# Patient Record
Sex: FEMALE | Race: WHITE | NOT HISPANIC OR LATINO | Employment: UNEMPLOYED | URBAN - NONMETROPOLITAN AREA
[De-identification: names, ages, dates, MRNs, and addresses within clinical notes are randomized per-mention and may not be internally consistent; named-entity substitution may affect disease eponyms.]

---

## 2017-01-30 ENCOUNTER — OFFICE VISIT - GICH (OUTPATIENT)
Dept: FAMILY MEDICINE | Facility: OTHER | Age: 52
End: 2017-01-30

## 2017-01-30 ENCOUNTER — AMBULATORY - GICH (OUTPATIENT)
Dept: FAMILY MEDICINE | Facility: OTHER | Age: 52
End: 2017-01-30

## 2017-01-30 DIAGNOSIS — G25.81 RESTLESS LEGS SYNDROME: ICD-10-CM

## 2017-01-30 DIAGNOSIS — I80.299 PHLEBITIS AND THROMBOPHLEBITIS OF OTHER DEEP VESSELS OF UNSPECIFIED LOWER EXTREMITY (H): ICD-10-CM

## 2017-01-30 ASSESSMENT — PATIENT HEALTH QUESTIONNAIRE - PHQ9: SUM OF ALL RESPONSES TO PHQ QUESTIONS 1-9: 13

## 2017-02-01 ENCOUNTER — AMBULATORY - GICH (OUTPATIENT)
Dept: ONCOLOGY | Facility: OTHER | Age: 52
End: 2017-02-01

## 2017-02-13 ENCOUNTER — AMBULATORY - GICH (OUTPATIENT)
Dept: FAMILY MEDICINE | Facility: OTHER | Age: 52
End: 2017-02-13

## 2017-04-10 ENCOUNTER — HISTORY (OUTPATIENT)
Dept: FAMILY MEDICINE | Facility: OTHER | Age: 52
End: 2017-04-10

## 2017-04-10 ENCOUNTER — OFFICE VISIT - GICH (OUTPATIENT)
Dept: FAMILY MEDICINE | Facility: OTHER | Age: 52
End: 2017-04-10

## 2017-04-10 DIAGNOSIS — G25.81 RESTLESS LEGS SYNDROME: ICD-10-CM

## 2017-04-10 DIAGNOSIS — F32.89 OTHER SPECIFIED DEPRESSIVE EPISODES: ICD-10-CM

## 2017-04-10 LAB
A/G RATIO - HISTORICAL: 1.5 (ref 1–2)
ABSOLUTE BASOPHILS - HISTORICAL: 0.1 THOU/CU MM
ABSOLUTE EOSINOPHILS - HISTORICAL: 0.1 THOU/CU MM
ABSOLUTE LYMPHOCYTES - HISTORICAL: 2.1 THOU/CU MM (ref 0.9–2.9)
ABSOLUTE MONOCYTES - HISTORICAL: 0.3 THOU/CU MM
ABSOLUTE NEUTROPHILS - HISTORICAL: 2.3 THOU/CU MM (ref 1.7–7)
ALBUMIN SERPL-MCNC: 4 G/DL (ref 3.5–5.7)
ALP SERPL-CCNC: 72 IU/L (ref 34–104)
ALT (SGPT) - HISTORICAL: 15 IU/L (ref 7–52)
ANION GAP - HISTORICAL: 7 (ref 5–18)
AST SERPL-CCNC: 27 IU/L (ref 13–39)
BASOPHILS # BLD AUTO: 2.4 %
BILIRUB SERPL-MCNC: 0.4 MG/DL (ref 0.3–1)
BUN SERPL-MCNC: 12 MG/DL (ref 7–25)
BUN/CREAT RATIO - HISTORICAL: 15
CALCIUM SERPL-MCNC: 8.9 MG/DL (ref 8.6–10.3)
CHLORIDE SERPLBLD-SCNC: 106 MMOL/L (ref 98–107)
CO2 SERPL-SCNC: 23 MMOL/L (ref 21–31)
CREAT SERPL-MCNC: 0.8 MG/DL (ref 0.7–1.3)
EOSINOPHIL NFR BLD AUTO: 2.6 %
ERYTHROCYTE [DISTWIDTH] IN BLOOD BY AUTOMATED COUNT: 17.6 % (ref 11.5–15.5)
GFR IF NOT AFRICAN AMERICAN - HISTORICAL: >60 ML/MIN/1.73M2
GLOBULIN - HISTORICAL: 2.7 G/DL (ref 2–3.7)
GLUCOSE SERPL-MCNC: 91 MG/DL (ref 70–105)
HCT VFR BLD AUTO: 25.2 % (ref 33–51)
HEMOGLOBIN: 7.5 G/DL (ref 12–16)
LYMPHOCYTES NFR BLD AUTO: 41.6 % (ref 20–44)
MCH RBC QN AUTO: 20.2 PG (ref 26–34)
MCHC RBC AUTO-ENTMCNC: 29.9 G/DL (ref 32–36)
MCV RBC AUTO: 68 FL (ref 80–100)
MONOCYTES NFR BLD AUTO: 6.8 %
NEUTROPHILS NFR BLD AUTO: 46.6 % (ref 42–72)
PLATELET # BLD AUTO: 291 THOU/CU MM (ref 140–440)
PMV BLD: 7.2 FL (ref 6.5–11)
POTASSIUM SERPL-SCNC: 3.8 MMOL/L (ref 3.5–5.1)
PROT SERPL-MCNC: 6.7 G/DL (ref 6.4–8.9)
RED BLOOD COUNT - HISTORICAL: 3.73 MIL/CU MM (ref 4–5.2)
SODIUM SERPL-SCNC: 136 MMOL/L (ref 133–143)
TSH - HISTORICAL: 3.4 UIU/ML (ref 0.34–5.6)
WHITE BLOOD COUNT - HISTORICAL: 5 THOU/CU MM (ref 4.5–11)

## 2017-04-10 ASSESSMENT — PATIENT HEALTH QUESTIONNAIRE - PHQ9: SUM OF ALL RESPONSES TO PHQ QUESTIONS 1-9: 14

## 2017-04-11 ENCOUNTER — AMBULATORY - GICH (OUTPATIENT)
Dept: FAMILY MEDICINE | Facility: OTHER | Age: 52
End: 2017-04-11

## 2017-04-11 DIAGNOSIS — E66.09 OTHER OBESITY DUE TO EXCESS CALORIES: ICD-10-CM

## 2017-04-11 DIAGNOSIS — N92.1 EXCESSIVE AND FREQUENT MENSTRUATION WITH IRREGULAR CYCLE: ICD-10-CM

## 2017-05-11 ENCOUNTER — AMBULATORY - GICH (OUTPATIENT)
Dept: FAMILY MEDICINE | Facility: OTHER | Age: 52
End: 2017-05-11

## 2017-07-12 ENCOUNTER — AMBULATORY - GICH (OUTPATIENT)
Dept: FAMILY MEDICINE | Facility: OTHER | Age: 52
End: 2017-07-12

## 2017-07-12 ENCOUNTER — COMMUNICATION - GICH (OUTPATIENT)
Dept: FAMILY MEDICINE | Facility: OTHER | Age: 52
End: 2017-07-12

## 2017-07-19 ENCOUNTER — AMBULATORY - GICH (OUTPATIENT)
Dept: FAMILY MEDICINE | Facility: OTHER | Age: 52
End: 2017-07-19

## 2017-07-28 ENCOUNTER — AMBULATORY - GICH (OUTPATIENT)
Dept: FAMILY MEDICINE | Facility: OTHER | Age: 52
End: 2017-07-28

## 2017-08-10 ENCOUNTER — AMBULATORY - GICH (OUTPATIENT)
Dept: FAMILY MEDICINE | Facility: OTHER | Age: 52
End: 2017-08-10

## 2017-08-14 ENCOUNTER — AMBULATORY - GICH (OUTPATIENT)
Dept: FAMILY MEDICINE | Facility: OTHER | Age: 52
End: 2017-08-14

## 2017-10-26 ENCOUNTER — AMBULATORY - GICH (OUTPATIENT)
Dept: FAMILY MEDICINE | Facility: OTHER | Age: 52
End: 2017-10-26

## 2017-10-26 DIAGNOSIS — F32.89 OTHER SPECIFIED DEPRESSIVE EPISODES: ICD-10-CM

## 2017-12-28 NOTE — TELEPHONE ENCOUNTER
Patient Information     Patient Name MRN Sex Vikki Arias [id number] Female 1965      Telephone Encounter by Radha Meza RN at 10/26/2017  2:49 PM     Author:  Radha Meza RN Service:  (none) Author Type:  NURS- Registered Nurse     Filed:  10/26/2017  2:50 PM Encounter Date:  10/26/2017 Status:  Signed     :  Radha Meza RN (NURS- Registered Nurse)            Attempted to contact patient by phone to obtain more information in case nursing can help in any way.  Left message to call back  ....................Radha Meza RN  10/26/2017   2:50 PM

## 2018-01-02 ENCOUNTER — AMBULATORY - GICH (OUTPATIENT)
Dept: FAMILY MEDICINE | Facility: OTHER | Age: 53
End: 2018-01-02

## 2018-01-02 DIAGNOSIS — J02.9 ACUTE PHARYNGITIS: ICD-10-CM

## 2018-01-03 NOTE — NURSING NOTE
Patient Information     Patient Name MRN Vikki Cardona [id number] Female 1965      Nursing Note by Wanda Scherer at 2017  2:15 PM     Author:  Wanda Scherer Service:  (none) Author Type:  (none)     Filed:  2017  2:34 PM Encounter Date:  2017 Status:  Signed     :  Wanda Scherer            Patient presents to the clinic with left leg swelling, she does stand a lot of her work day.  She's having a lot of pain.    She is having some issues with depression as well.    PHQ Depression Screening 2017   Date of PHQ exam (doc flow) 2017   1. Lack of interest/pleasure 2 - More than half the days   2. Feeling down/depressed 2 - More than half the days   PHQ-2 TOTAL SCORE 4   3. Trouble sleeping 3 - Nearly every day   4. Decreased energy 1 - Several days   5. Appetite change 3 - Nearly every day   6. Feelings of failure 1 - Several days   7. Trouble concentrating 0 - Not at all   8. Activity level 0 - Not at all   9. Hurting yourself 1 - Several days   PHQ-9 TOTAL SCORE 13   PHQ-9 Severity Level moderate       Wanda Scherer LPN....................2017 2:22 PM

## 2018-01-03 NOTE — PROGRESS NOTES
Patient Information     Patient Name MRN Sex Vikki Arias [id number] Female 1965      Progress Notes by Gonzalo Medellin MD at 2017  2:15 PM     Author:  Gonzalo Medellin MD Service:  (none) Author Type:  Physician     Filed:  2017  2:57 PM Encounter Date:  2017 Status:  Signed     :  Gonzalo Medellin MD (Physician)            Nursing Notes:   Wanda Scherer  2017  2:34 PM  Signed  Patient presents to the clinic with left leg swelling, she does stand a lot of her work day.  She's having a lot of pain.    She is having some issues with depression as well.    PHQ Depression Screening 2017   Date of PHQ exam (doc flow) 2017   1. Lack of interest/pleasure 2 - More than half the days   2. Feeling down/depressed 2 - More than half the days   PHQ-2 TOTAL SCORE 4   3. Trouble sleeping 3 - Nearly every day   4. Decreased energy 1 - Several days   5. Appetite change 3 - Nearly every day   6. Feelings of failure 1 - Several days   7. Trouble concentrating 0 - Not at all   8. Activity level 0 - Not at all   9. Hurting yourself 1 - Several days   PHQ-9 TOTAL SCORE 13   PHQ-9 Severity Level moderate       Wanda FAJARDOTracy Scherer LPN....................2017 2:22 PM    Vikki Carrizales is a 51 y.o. female who presents for   Chief Complaint     Patient presents with       Leg Swelling      swollen left leg     Depression      PHQ9: 13     HPI: Ms. Carrizales comes in with left anterior lower leg pain/ she has no over all swelling in the leg and does have a history of phlebitis. She wears support hose. No CP nor shortness of breath. Her BP is elevated today but [facility] has no history of HYPERTENSION.  ewill monitor at home.   Past Medical History      Diagnosis   Date     Allergies: rhintiis       lifelong      Asthma  1984     Depression       Rx: zoloft, citalopram and prozac in the past, none since )      Genital herpes       GERD (gastroesophageal reflux disease)        History of pregnancy        1 child  in childbirth (); one living child (b )      Impetigo       recurrent - as a child      Vitamin D deficiency       Past Surgical History       Procedure   Laterality Date     Pr  delivery only        HELLP syndrome, Platelet transfusions; son        Pr  delivery only        Gastric bypass         [place] en [place] Gastric Bypass, Dr Gabriel Torres, [facility]       Family History       Problem   Relation Age of Onset     Diabetes  Maternal Grandmother      Diabetes  Maternal Aunt      Diabetes  Brother      Other  Maternal Grandfather      Depression       Stroke  Maternal Grandmother      Osteoporosis  Maternal Grandfather      Other  Sister      Thrombophlebitis       Heart Disease  Brother      Other  Mother      Cancer-breast  No Family History      Current Outpatient Prescriptions       Medication  Sig Dispense Refill     fluticasone (50 mcg per actuation) nasal solution (FLONASE) Inhale 2 Sprays into both nostrils once daily. 3 Bottle 2     traZODone (DESYREL) 50 mg tablet TAKE 1/2-1 TABLET BY MOUTH NIGHTLY AT BEDTIME FOR RESTLESS LEGS 30 tablet 1     No current facility-administered medications for this visit.      Medications have been reviewed by me and are current to the best of my knowledge and ability.    Allergies      Allergen   Reactions     Other [Unlisted Allergen (Include Detail In Comments)]       seasonal        EXAM:   Vitals:     17 1422   BP: 152/88   Temp: 98  F (36.7  C)   TempSrc: Temporal   Weight: 87.2 kg (192 lb 3.2 oz)     General Appearance: Pleasant, alert, appropriate appearance for age. No acute distress  Chest/Respiratory Exam: Normal chest wall and respirations. Clear to auscultation.  Cardiovascular Exam: Regular rate and rhythm. S1, S2, no murmur, click, gallop, or rubs.  Musculoskeletal Exam: superfiscial marked tender area left anterior shin with obvious phlebitis; no tenderness in  calf  ASSESSMENT AND PLAN:  1. RESTLESS LEG SYNDROME  This controls symptoms well  - traZODone (DESYREL) 50 mg tablet; Take 0.5-1 tablets by mouth at bedtime.  Dispense: 30 tablet; Refill: 1    2. Thrombophlebitis leg (HC)  Moist heat/ elevation/ indomethacine

## 2018-01-03 NOTE — TELEPHONE ENCOUNTER
Patient Information     Patient Name MRN Sex Vikki Arias [id number] Female 1965      Telephone Encounter by Wanda Scherer at 2017  2:29 PM     Author:  Wanda Scherer Service:  (none) Author Type:  (none)     Filed:  2017  2:29 PM Encounter Date:  2017 Status:  Signed     :  Wanda Scherer            Discussed at appointment  Wanda Scherer LPN....................2017 2:29 PM

## 2018-01-04 NOTE — PROGRESS NOTES
"Patient Information     Patient Name MRN Sex Vikki Arias [id number] Female 1965      Progress Notes by Gonzalo Medellin MD at 4/10/2017  4:15 PM     Author:  Gonzalo Medellin MD  Service:  (none) Author Type:  Physician     Filed:  2017 12:28 PM  Encounter Date:  4/10/2017 Status:  Addendum     :  Gonzalo Medellin MD (Physician)        Related Notes: Original Note by Gonzalo Medellin MD (Physician) filed at 4/10/2017  4:48 PM            Nursing Notes:   Wanda Scherer  4/10/2017  4:27 PM  Signed  Patient presents to the clinic to talk about depression and treatment.  Wanda Scherer LPN....................4/10/2017 4:19 PM    Vikki Carrizales is a 51 y.o. female who presents for   Chief Complaint     Patient presents with       Depression      Talk about meds     HPI: Ms. Carrizales comes in with depression symptoms although she is doing better over the past few days with this; she is wise and has wisdom in dealing with \"down\" symptoms. Her vegatative signs are manageable. But she still has symptoms that could use help; she has had some chills that suggest a need for thyroid testing and CBC and other labs;   Past Medical History:     Diagnosis  Date     Allergies: rhintiis     lifelong      Asthma      Depression     Rx: zoloft, citalopram and prozac in the past, none since )      Genital herpes      GERD (gastroesophageal reflux disease)      History of pregnancy      1 child  in childbirth (); one living child (b )      Impetigo     recurrent - as a child      Vitamin D deficiency      Past Surgical History:      Procedure  Laterality Date     GASTRIC BYPASS  2002     Lenore en Y Gastric Bypass, Dr Gabriel Torres, [facility]       [place]  DELIVERY ONLY      HELLP syndrome, Platelet transfusions; son        FL  DELIVERY ONLY       Family History       Problem   Relation Age of Onset     Other  Mother      Diabetes  Maternal " "Grandmother      Stroke  Maternal Grandmother      Diabetes  Maternal Aunt      Diabetes  Brother      Other  Maternal Grandfather      Depression       Osteoporosis  Maternal Grandfather      Other  Sister      Thrombophlebitis       Heart Disease  Brother      Cancer-breast  No Family History      Current Outpatient Prescriptions       Medication  Sig Dispense Refill     fluticasone (50 mcg per actuation) nasal solution (FLONASE) Inhale 2 Sprays into both nostrils once daily. 3 Bottle 2     traZODone (DESYREL) 50 mg tablet Take 0.5-1 tablets by mouth at bedtime. 30 tablet 6     No current facility-administered medications for this visit.      Medications have been reviewed by me and are current to the best of my knowledge and ability.    Allergies      Allergen   Reactions     Other [Unlisted Allergen (Include Detail In Comments)]       seasonal          EXAM:   Vitals:     04/10/17 1619   BP: 126/84   Weight: 89 kg (196 lb 3.2 oz)   Height: 1.63 m (5' 4.17\")     General Appearance: Pleasant, alert, appropriate appearance for age. No acute distress  Psychiatric Exam: Alert and oriented, appropriate affect.  ASSESSMENT AND PLAN:    2. Other depression  Signs and symptoms/ life discussed for 20 min     3 Anemia- severe iron deficiency related to poor absorption after gastric bypass and irreg heavy menstrual flow- Hemoglobin 7.4 asymptomatic- has been this low in past and has done better with liquid iron prep recommended by previous MD- tammy OTC- with better results for her than typical sulfatre or gluconate; se is encouraged to go with that and recehck Hemoglobin in a month- sooner if symptoms- she declines Rx or parenteral            "

## 2018-01-04 NOTE — NURSING NOTE
Patient Information     Patient Name MRN Vikki Cardona [id number] Female 1965      Nursing Note by Wanda Scherer at 4/10/2017  4:15 PM     Author:  Wanda Scherer Service:  (none) Author Type:  (none)     Filed:  4/10/2017  4:27 PM Encounter Date:  4/10/2017 Status:  Signed     :  Wanda Scherer            Patient presents to the clinic to talk about depression and treatment.  Wanda Scherer LPN....................4/10/2017 4:19 PM

## 2018-01-04 NOTE — TELEPHONE ENCOUNTER
Patient Information     Patient Name MRN Sex Vikki Arias [id number] Female 1965      Telephone Encounter by Wanda Scherer at 2017  3:58 PM     Author:  Wanda Scherer Service:  (none) Author Type:  (none)     Filed:  2017  3:58 PM Encounter Date:  2017 Status:  Signed     :  Wanda Scherer Dr. spoke with patient.  Wanda Scherer LPN....................2017 3:58 PM

## 2018-01-04 NOTE — TELEPHONE ENCOUNTER
Patient Information     Patient Name MRN Sex Vikki Arias [id number] Female 1965      Telephone Encounter by Wanda Scherer at 2017  3:58 PM     Author:  Wanda Scherer Service:  (none) Author Type:  (none)     Filed:  2017  3:58 PM Encounter Date:  2017 Status:  Signed     :  Wanda Scherer Dr. spoke with patient.  Wanda Scherer LPN....................2017 3:58 PM

## 2018-01-12 ENCOUNTER — AMBULATORY - GICH (OUTPATIENT)
Dept: FAMILY MEDICINE | Facility: OTHER | Age: 53
End: 2018-01-12

## 2018-01-15 ENCOUNTER — AMBULATORY - GICH (OUTPATIENT)
Dept: FAMILY MEDICINE | Facility: OTHER | Age: 53
End: 2018-01-15

## 2018-01-15 ENCOUNTER — HISTORY (OUTPATIENT)
Dept: FAMILY MEDICINE | Facility: OTHER | Age: 53
End: 2018-01-15

## 2018-01-15 DIAGNOSIS — G25.81 RESTLESS LEGS SYNDROME: ICD-10-CM

## 2018-01-15 DIAGNOSIS — F32.89 OTHER SPECIFIED DEPRESSIVE EPISODES: ICD-10-CM

## 2018-01-15 DIAGNOSIS — L72.3 SEBACEOUS CYST: ICD-10-CM

## 2018-01-15 ASSESSMENT — PATIENT HEALTH QUESTIONNAIRE - PHQ9: SUM OF ALL RESPONSES TO PHQ QUESTIONS 1-9: 15

## 2018-01-16 ENCOUNTER — AMBULATORY - GICH (OUTPATIENT)
Dept: SURGERY | Facility: OTHER | Age: 53
End: 2018-01-16

## 2018-01-16 ENCOUNTER — HISTORY (OUTPATIENT)
Dept: SURGERY | Facility: OTHER | Age: 53
End: 2018-01-16

## 2018-01-16 DIAGNOSIS — L02.91 CUTANEOUS ABSCESS: ICD-10-CM

## 2018-01-16 DIAGNOSIS — L72.3 SEBACEOUS CYST: ICD-10-CM

## 2018-01-26 ENCOUNTER — DOCUMENTATION ONLY (OUTPATIENT)
Dept: FAMILY MEDICINE | Facility: OTHER | Age: 53
End: 2018-01-26

## 2018-01-26 PROBLEM — E66.09 NON MORBID OBESITY DUE TO EXCESS CALORIES: Status: ACTIVE | Noted: 2017-04-11

## 2018-01-26 PROBLEM — K21.9 ESOPHAGEAL REFLUX: Status: ACTIVE | Noted: 2018-01-26

## 2018-01-26 PROBLEM — N92.1 MENORRHAGIA WITH IRREGULAR CYCLE: Status: ACTIVE | Noted: 2017-04-11

## 2018-01-26 PROBLEM — J30.9 ALLERGIC RHINITIS: Status: ACTIVE | Noted: 2018-01-26

## 2018-01-26 PROBLEM — G25.81 RESTLESS LEG SYNDROME: Status: ACTIVE | Noted: 2018-01-26

## 2018-01-26 RX ORDER — TRAZODONE HYDROCHLORIDE 50 MG/1
25-50 TABLET, FILM COATED ORAL AT BEDTIME
COMMUNITY
Start: 2017-01-30 | End: 2019-01-28

## 2018-01-26 RX ORDER — FLUTICASONE PROPIONATE 50 MCG
2 SPRAY, SUSPENSION (ML) NASAL DAILY
COMMUNITY
Start: 2015-01-16 | End: 2019-03-25

## 2018-01-27 VITALS
WEIGHT: 192.2 LBS | SYSTOLIC BLOOD PRESSURE: 152 MMHG | DIASTOLIC BLOOD PRESSURE: 88 MMHG | BODY MASS INDEX: 32.81 KG/M2 | TEMPERATURE: 98 F

## 2018-01-27 VITALS
BODY MASS INDEX: 33.49 KG/M2 | WEIGHT: 196.2 LBS | SYSTOLIC BLOOD PRESSURE: 126 MMHG | HEIGHT: 64 IN | DIASTOLIC BLOOD PRESSURE: 84 MMHG

## 2018-01-28 ENCOUNTER — HEALTH MAINTENANCE LETTER (OUTPATIENT)
Age: 53
End: 2018-01-28

## 2018-02-03 ASSESSMENT — PATIENT HEALTH QUESTIONNAIRE - PHQ9
SUM OF ALL RESPONSES TO PHQ QUESTIONS 1-9: 14
SUM OF ALL RESPONSES TO PHQ QUESTIONS 1-9: 13

## 2018-02-09 ENCOUNTER — DOCUMENTATION ONLY (OUTPATIENT)
Dept: FAMILY MEDICINE | Facility: OTHER | Age: 53
End: 2018-02-09

## 2018-02-09 VITALS
SYSTOLIC BLOOD PRESSURE: 138 MMHG | TEMPERATURE: 97.3 F | BODY MASS INDEX: 34.42 KG/M2 | WEIGHT: 206.6 LBS | DIASTOLIC BLOOD PRESSURE: 86 MMHG | HEIGHT: 65 IN

## 2018-02-09 VITALS
HEIGHT: 65 IN | DIASTOLIC BLOOD PRESSURE: 80 MMHG | WEIGHT: 206 LBS | SYSTOLIC BLOOD PRESSURE: 120 MMHG | BODY MASS INDEX: 34.32 KG/M2

## 2018-02-11 ASSESSMENT — PATIENT HEALTH QUESTIONNAIRE - PHQ9: SUM OF ALL RESPONSES TO PHQ QUESTIONS 1-9: 15

## 2018-02-12 ENCOUNTER — DOCUMENTATION ONLY (OUTPATIENT)
Dept: FAMILY MEDICINE | Facility: OTHER | Age: 53
End: 2018-02-12

## 2018-02-12 NOTE — PROGRESS NOTES
Patient Information     Patient Name MRN Sex     Vikki Carrizales [id number] Female 1965      Progress Notes by Gonzalo Medellin MD at 1/15/2018  3:45 PM     Author:  Gonzalo Medellin MD Service:  (none) Author Type:  Physician     Filed:  1/15/2018  4:30 PM Encounter Date:  1/15/2018 Status:  Signed     :  Gonzalo Medellin MD (Physician)            There are no exam notes on file for this visit.  Vikki Carrizales is a 52 y.o. female who presents for   Chief Complaint     Patient presents with       Derm Problem      Cyst on back     HPI: Ms. Carrizales comes in with infected area on her R upper back- typical of what a sebaceous gets like when infected. It has been present a few days and it hurts. She was not aware of any cyst there previously  Past Medical History:     Diagnosis  Date     Allergies: rhintiis     lifelong      Asthma      Depression     Rx: zoloft, citalopram and prozac in the past, none since )      Genital herpes      GERD (gastroesophageal reflux disease)      History of pregnancy      1 child  in childbirth (); one living child (b )      Impetigo     recurrent - as a child      Vitamin D deficiency      Past Surgical History:      Procedure  Laterality Date     GASTRIC BYPASS       Lenore en Y Gastric Bypass, Dr Gabriel Torres, [facility]       [place]  DELIVERY ONLY      HELLP syndrome, Platelet transfusions; son        [place]  DELIVERY ONLY       Family History       Problem   Relation Age of Onset     Other  Mother      Diabetes  Maternal Grandmother      Stroke  Maternal Grandmother      Diabetes  Maternal Aunt      Diabetes  Brother      Other  Maternal Grandfather      Depression       Osteoporosis  Maternal Grandfather      Other  Sister      Thrombophlebitis       Heart Disease  Brother      Cancer-breast  No Family History      Current Outpatient Prescriptions       Medication  Sig Dispense Refill     fluticasone (50  "mcg per actuation) nasal solution (FLONASE) Inhale 2 Sprays into both nostrils once daily. 3 Bottle 2     sertraline (ZOLOFT) 50 mg tablet Take 1 tablet by mouth once daily. 30 tablet 4     traZODone (DESYREL) 50 mg tablet Take 0.5-1 tablets by mouth at bedtime. 30 tablet 6     No current facility-administered medications for this visit.      Medications have been reviewed by me and are current to the best of my knowledge and ability.    Allergies      Allergen   Reactions     Other [Unlisted Allergen (Include Detail In Comments)]       seasonal        Vitals:     01/15/18 1555   BP: 138/86   Cuff Site: Right Arm   Position: Sitting   Cuff Size: Adult Large   Temp: 97.3  F (36.3  C)   TempSrc: Temporal   Weight: 93.7 kg (206 lb 9.6 oz)   Height: 1.638 m (5' 4.5\")     General Appearance: Pleasant, alert, appropriate appearance for age. No acute distress  Skin: has large fluctuant cyst on her R upper back   ASSESSMENT AND PLAN:  1. RESTLESS LEG SYNDROME    - traZODone (DESYREL) 50 mg tablet; Take 0.5-1 tablets by mouth at bedtime.  Dispense: 30 tablet; Refill: 6    2. Other depression    - sertraline (ZOLOFT) 50 mg tablet; Take 1 tablet by mouth once daily.  Dispense: 30 tablet; Refill: 11    3. Sebaceous cyst  See surgeon ASAP                 "

## 2018-02-13 ENCOUNTER — OFFICE VISIT (OUTPATIENT)
Dept: SURGERY | Facility: OTHER | Age: 53
End: 2018-02-13
Attending: SURGERY

## 2018-02-13 VITALS — WEIGHT: 207.4 LBS | BODY MASS INDEX: 35.05 KG/M2 | SYSTOLIC BLOOD PRESSURE: 102 MMHG | DIASTOLIC BLOOD PRESSURE: 64 MMHG

## 2018-02-13 DIAGNOSIS — L08.9 INFECTED SEBACEOUS CYST OF SKIN: Primary | ICD-10-CM

## 2018-02-13 DIAGNOSIS — L72.3 INFECTED SEBACEOUS CYST OF SKIN: Primary | ICD-10-CM

## 2018-02-13 PROCEDURE — 99212 OFFICE O/P EST SF 10 MIN: CPT | Performed by: SURGERY

## 2018-02-13 NOTE — PROGRESS NOTES
"Patient Information     Patient Name MRN Sex Vikki Arias [id number] Female 1965      Progress Notes by Debi Vieyra MD at 2018  2:20 PM     Author:  Debi Vieyra MD Service:  (none) Author Type:  Physician     Filed:  2018  3:15 PM Encounter Date:  2018 Status:  Signed     :  Debi Vieyra MD (Physician)            Patient with inflamed and swollen cyst on the right upper back. Has had this before and drained it herself. She has been trying to get this to drain but has been unsuccessful. She hasn't had fever.   /80 (Cuff Site: Right Arm, Position: Sitting, Cuff Size: Adult Large)  Ht 1.638 m (5' 4.5\")  Wt 93.4 kg (206 lb)  LMP 2017  BMI 34.81 kg/m2  General; no acute distress  Skin: right upper back with 4 x 3 x 2 cm area of induration and erythema, central fluctuance noted. No active drainage.   A-infected cyst  Plan:  Discussed risks and benefits of incision and drainage infection. Specifically, I explained the risks of continued infection, bleeding, bruising and recurrence. The patient expressed understanding and wishes to proceed. Informed consent paperwork was completed.     Procedure:  The patient was placed in a left lateral position. The right upper back was prepped with chloraprep and draped. Local anesthetic, lidocaine with epinephrine, was infiltrated in the skin and subcutaneous tissue in the area of planned incision. The area of fluctuance was palpated. Skin incision was made sharply. There was drainage of purulent fluid. The abscess cavity was explored and no undrained fluid was noted. The abscess cavity measured 3 x 2 x 2 cm. Sterile gauze was placed in the cavity. Sterile dressing was applied. The patient tolerated the procedure with no immediately apparent complications.     Patient will follow up in 4 weeks. Patient will call with concerns or questions prior to follow up.            "

## 2018-02-13 NOTE — NURSING NOTE
Patient Information     Patient Name MRN Sex Vikki Arias [id number] Female 1965      Nursing Note by Cynthia King at 2018  2:20 PM     Author:  Cynthia King  Service:  (none) Author Type:  (none)     Filed:  2018  2:39 PM  Encounter Date:  2018 Status:  Addendum     :  Cynthia King        Related Notes: Original Note by Cynthia King filed at 2018  2:26 PM            Here today with a cyst on her upper right back.  Cynthia King LPN..........2018  2:25 PM    Universal Protocol    A. Pre-procedure verification complete yes  1-relevant information / documentation available, reviewed and properly matched to the patient; 2-consent accurate and complete, 3-equipment and supplies available    B. Site marking complete Yes  Site marked if not in continuous attendance with patient    C. TIME OUT completed yes  Time Out was conducted just prior to starting procedure to verify the eight required elements: 1-patient identity, 2-consent accurate and complete, 3-position, 4-correct side/site marked (if applicable), 5-procedure, 6-relevant images / results properly labeled and displayed (if applicable), 7-antibiotics / irrigation fluids (if applicable), 8-safety precautions.                                                            Cynthia King LPN..........2018  2:39 PM

## 2018-02-13 NOTE — MR AVS SNAPSHOT
"              After Visit Summary   2018    Vikki Carrizales    MRN: [id number]           Patient Information     Date Of Birth          1965        Visit Information        Provider Department      2018 3:50 PM Debi Vieyra MD;   SURGERY [facility]        Today's Diagnoses     Infected sebaceous cyst of skin    -  1       Follow-ups after your visit        Follow-up notes from your care team     Return if symptoms worsen or fail to improve.      Who to contact     If you have questions or need follow up information about today's clinic visit or your schedule please contact [facility] AND [facility] directly at [phone].  Normal or non-critical lab and imaging results will be communicated to you by PointCarehart, letter or phone within 4 business days after the clinic has received the results. If you do not hear from us within 7 days, please contact the clinic through PointCarehart or phone. If you have a critical or abnormal lab result, we will notify you by phone as soon as possible.  Submit refill requests through [employer] or call your pharmacy and they will forward the refill request to us. Please allow 3 business days for your refill to be completed.          Additional Information About Your Visit        MyChart Information     FabAlley lets you send messages to your doctor, view your test results, renew your prescriptions, schedule appointments and more. To sign up, go to [email or web address] . Click on \"Log in\" on the left side of the screen, which will take you to the Welcome page. Then click on \"Sign up Now\" on the right side of the page.     You will be asked to enter the access code listed below, as well as some personal information. Please follow the directions to create your username and password.     Your access code is: [email or web address]  Expires: 2018  2:42 PM     Your access code will  in 90 days. If you need help or a new code, please call your " [facility] or [phone].        Care EveryWhere ID     This is your Care EveryWhere ID. This could be used by other organizations to access your [place] medical records  [id number]        Your Vitals Were     Last Period BMI (Body Mass Index)                (LMP Unknown) 35.05 kg/m2           Blood Pressure from Last 3 Encounters:   02/13/18 102/64   01/16/18 120/80   01/15/18 138/86    Weight from Last 3 Encounters:   02/13/18 207 lb 6.4 oz (94.1 kg)   01/16/18 206 lb (93.4 kg)   01/15/18 206 lb 9.6 oz (93.7 kg)              Today, you had the following     No orders found for display       Primary Care Provider Office Phone # Fax #    Gonzalo Medellin [phone] [address] [place] [address]        Equal Access to Services     [facility]: Hadii aad ku hadasho Sokamryn, waaxda luqadaha, qaybta kaalmada adeegyada, horace calvo . So [facility] [phone].    ATENCIÓN: Si habla español, tiene a murphy disposición servicios gratuitos de asistencia lingüística. Llame al [phone].    We comply with applicable federal civil rights laws and [place] laws. We do not discriminate on the basis of race, color, national origin, age, disability, sex, sexual orientation, or gender identity.            Thank you!     Thank you for choosing [facility] AND [facility]  for your care. Our goal is always to provide you with excellent care. Hearing back from our patients is one way we can continue to improve our services. Please take a few minutes to complete the written survey that you may receive in the mail after your visit with us. Thank you!             Your Updated Medication List - Protect others around you: Learn how to safely use, store and throw away your medicines at www.disposemymeds.org.          This list is accurate as of 2/13/18 11:59 PM.  Always use your most recent med list.                   Brand Name Dispense Instructions for use  Diagnosis    fluticasone 50 MCG/ACT spray    FLONASE     Spray 2 sprays in nostril daily        sertraline 50 MG tablet    ZOLOFT     Take 50 mg by mouth daily        traZODone 50 MG tablet    DESYREL     Take 25-50 mg by mouth At Bedtime

## 2018-02-13 NOTE — NURSING NOTE
Here today in follow up for a infected cyst on her back.  Cynthia King LPN..........2/13/2018  3:59 PM

## 2018-02-13 NOTE — PATIENT INSTRUCTIONS
Patient Information     Patient Name MRN Sex Vikki Arias [id number] Female 1965      Patient Instructions by Debi Vieyra MD at 2018  2:20 PM     Author:  Debi Vieyra MD Service:  (none) Author Type:  Physician     Filed:  2018  2:54 PM Encounter Date:  2018 Status:  Signed     :  Debi Vieyra MD (Physician)            Your incision was left open to drain. OK to shower. Follow up in 4 weeks.

## 2018-02-18 PROBLEM — L72.3 INFECTED SEBACEOUS CYST OF SKIN: Status: RESOLVED | Noted: 2018-02-18 | Resolved: 2018-02-18

## 2018-02-18 PROBLEM — L08.9 INFECTED SEBACEOUS CYST OF SKIN: Status: RESOLVED | Noted: 2018-02-18 | Resolved: 2018-02-18

## 2018-02-18 PROBLEM — L08.9 INFECTED SEBACEOUS CYST OF SKIN: Status: ACTIVE | Noted: 2018-02-18

## 2018-02-18 PROBLEM — L72.3 INFECTED SEBACEOUS CYST OF SKIN: Status: ACTIVE | Noted: 2018-02-18

## 2018-02-18 NOTE — PROGRESS NOTES
Primary Care Physician: Gonzalo Medellin    HPI:   Patient is here for follow up. The patient is 52 year old female with a previous infected cyst on her right upper back. It was incised and drained. She did well after. There was some drainage and bleeding for a few days but the area has completely healed. She has no pain. She hasn't noticed any lump or bump.    ASSESSMENT AND PLAN/RECOMMENDATIONS:   Resolved infected cyst right upper back. We dicussed excision vs. Monitoring for recurrence. At this time the patient will monitor for any sign of recurrence. If she notes a lump or bump, she will schedule to have it removed. She denies questions at this time. She will call for concerns.    PAST MEDICAL HISTORY  Past Medical History:   Diagnosis Date     Cutaneous abscess     2018     Gastro-esophageal reflux disease without esophagitis          Herpesviral infection of urogenital system     No Comments Provided     Impetigo     recurrent - as a child     Major depressive disorder, single episode     ,Rx: zoloft, citalopram and prozac in the past, none since )     Other seasonal allergic rhinitis     lifelong     Personal history of other medical treatment (CODE)      1 child  in childbirth (); one living child (b )     Uncomplicated asthma          Vitamin D deficiency     No Comments Provided      PAST SURGICAL HISTORY    Past Surgical History:   Procedure Laterality Date      SECTION      ,,HELLP syndrome, Platelet transfusions; son       SECTION           LAPAROSCOPIC BYPASS GASTRIC      , Lenore en Y Gastric Bypass, Dr Gabriel Torres, [facility]     CURRENT MEDS    Current Outpatient Prescriptions   Medication     fluticasone (FLONASE) 50 MCG/ACT spray     sertraline (ZOLOFT) 50 MG tablet     traZODone (DESYREL) 50 MG tablet     No current facility-administered medications for this visit.      ALLERGIES/SENSITIVITIES    Allergies   Allergen  Reactions     Seasonal Allergies      seasonal     REVIEW OF SYSTEMS  GENERAL: No fevers orchills. Denies fatigue, recent weight loss.  HEENT: No sinus drainage. No changes with vision or hearing. No difficulty swallowing.   LYMPHATICS:  No swollen nodes in axilla, neck or groin.  CARDIOVASCULAR: Denieschest pain, palpitations and dyspnea on exertion.  PULMONARY: No shortness of breath or cough. No increase in sputum production.  GI: Denies melena, bright red blood in stools. No hematemesis. No constipation ordiarrhea.  : No dysuria or hematuria.  SKIN: No recent rashes or ulcers.   HEMATOLOGY:  No history of easy bruising or bleeding.  ENDOCRINE:  No history of diabetes or thyroid problems.  NEUROLOGY:  Nohistory of seizures or headaches. No motor or sensory changes.    PHYSICAL EXAM  Vitals: /64 (BP Location: Right arm, Patient Position: Sitting, Cuff Size: Adult Large)  Wt 207 lb 6.4 oz (94.1 kg)  LMP  (LMP Unknown)  BMI 35.05 kg/m2  BMI= Body mass index is 35.05 kg/(m^2).  GENERAL: Healthy appearing patient in no acute distress. Pleasant and cooperative with exam and interview.   HEENT:Head-normocephalic. Eyes-no scleral icterus, pupils equal, round, and reactive to light. Nose-no nasal drainage. No lesions. Mouth-oral mucosa pink and moist, no lesions.  SKIN: Pink, warm and dry. No jaundice. No rash. Right upper back with well healed cruciate scar. No fluid collection or associated nodularity  NEURO: Alert and oriented.  PSYCH: Appropriate mood and affect.

## 2018-02-26 ENCOUNTER — MYC MEDICAL ADVICE (OUTPATIENT)
Dept: SURGERY | Facility: OTHER | Age: 53
End: 2018-02-26

## 2018-02-26 ENCOUNTER — MYC MEDICAL ADVICE (OUTPATIENT)
Dept: FAMILY MEDICINE | Facility: OTHER | Age: 53
End: 2018-02-26

## 2018-02-27 NOTE — TELEPHONE ENCOUNTER
Dr Luciano is focusing on chronic disease I believe as it relates to obesity such as diabetes and HTN.. I am not aware that he is working on wt loss specifically but he is a good MD so seeing him would be fine to check it out...maybe call first. Bottom line there is no easy out and it always comes down to calories in/ calories out

## 2018-02-27 NOTE — TELEPHONE ENCOUNTER
Spoke with patient after verifying last name and birth date, informed her of Gonzalo Medellin MD note below.   Debora Gaspar LPN 2/27/2018 9:05 AM

## 2018-03-01 ENCOUNTER — MYC MEDICAL ADVICE (OUTPATIENT)
Dept: FAMILY MEDICINE | Facility: OTHER | Age: 53
End: 2018-03-01

## 2018-03-05 NOTE — TELEPHONE ENCOUNTER
Left message on answering machine for patient to make appointment.  Diana Guzman CMA..............3/5/2018........12:46 PM

## 2018-03-07 PROBLEM — L02.91 ABSCESS: Status: ACTIVE | Noted: 2018-01-16

## 2018-03-08 ENCOUNTER — OFFICE VISIT (OUTPATIENT)
Dept: FAMILY MEDICINE | Facility: OTHER | Age: 53
End: 2018-03-08
Attending: FAMILY MEDICINE

## 2018-03-08 VITALS
TEMPERATURE: 97.9 F | WEIGHT: 204.2 LBS | SYSTOLIC BLOOD PRESSURE: 110 MMHG | BODY MASS INDEX: 34.86 KG/M2 | HEART RATE: 62 BPM | HEIGHT: 64 IN | DIASTOLIC BLOOD PRESSURE: 60 MMHG

## 2018-03-08 DIAGNOSIS — F43.21 ADJUSTMENT DISORDER WITH DEPRESSED MOOD: Primary | ICD-10-CM

## 2018-03-08 PROCEDURE — 99213 OFFICE O/P EST LOW 20 MIN: CPT | Performed by: FAMILY MEDICINE

## 2018-03-08 ASSESSMENT — PAIN SCALES - GENERAL: PAINLEVEL: NO PAIN (0)

## 2018-03-08 NOTE — MR AVS SNAPSHOT
"              After Visit Summary   3/8/2018    Vikki Carrizales    MRN: [id number]           Patient Information     Date Of Birth          1965        Visit Information        Provider Department      3/8/2018 4:00 PM Gonzalo Medellin MD [facility]        Today's Diagnoses     Adjustment disorder with depressed mood    -  1       Follow-ups after your visit        Who to contact     If you have questions or need follow up information about today's clinic visit or your schedule please contact [facility] directly at [phone].  Normal or non-critical lab and imaging results will be communicated to you by [employer], letter or phone within 4 business days after the clinic has received the results. If you do not hear from us within 7 days, please contact the clinic through [employer] or phone. If you have a critical or abnormal lab result, we will notify you by phone as soon as possible.  Submit refill requests through [employer] or call your pharmacy and they will forward the refill request to us. Please allow 3 business days for your refill to be completed.          Additional Information About Your Visit        MyChart Information     [employer] gives you secure access to your electronic health record. If you see a primary care provider, you can also send messages to your care team and make appointments. If you have questions, please call your primary care clinic.  If you do not have a primary care provider, please call [phone] and they will assist you.        Care EveryWhere ID     This is your Care EveryWhere ID. This could be used by other organizations to access your [place] medical records  [id number]        Your Vitals Were     Pulse Temperature Height Last Period BMI (Body Mass Index)       62 97.9  F (36.6  C) (Temporal) 5' 3.5\" (1.613 m) (LMP Unknown) 35.61 kg/m2        Blood Pressure from Last 3 Encounters:   03/08/18 110/60   02/13/18 102/64   01/16/18 120/80 "    Weight from Last 3 Encounters:   03/08/18 204 lb 3.2 oz (92.6 kg)   02/13/18 207 lb 6.4 oz (94.1 kg)   01/16/18 206 lb (93.4 kg)              Today, you had the following     No orders found for display       Primary Care Provider Office Phone # Fax #    Gonzalo Medellin [phone] [address]   [place] [address]        Equal Access to Services     [facility] : Hadii aad ku hadasho Soomaali, waaxda luqadaha, qaybta kaalmada adeegyada, waxay idiin hayaan adeeg kharash la'soheilan . So [facility] [phone].    ATENCIÓN: Si habla español, tiene a murphy disposición servicios gratuitos de asistencia lingüística. [facility] [phone].    We comply with applicable federal civil rights laws and [place] laws. We do not discriminate on the basis of race, color, national origin, age, disability, sex, sexual orientation, or gender identity.            Thank you!     Thank you for choosing [facility]  for your care. Our goal is always to provide you with excellent care. Hearing back from our patients is one way we can continue to improve our services. Please take a few minutes to complete the written survey that you may receive in the mail after your visit with us. Thank you!             Your Updated Medication List - Protect others around you: Learn how to safely use, store and throw away your medicines at www.disposemymeds.org.          This list is accurate as of 3/8/18  4:32 PM.  Always use your most recent med list.                   Brand Name Dispense Instructions for use Diagnosis    fluticasone 50 MCG/ACT spray    FLONASE     Spray 2 sprays in nostril daily        sertraline 50 MG tablet    ZOLOFT     Take 50 mg by mouth daily        traZODone 50 MG tablet    DESYREL     Take 25-50 mg by mouth At Bedtime

## 2018-03-08 NOTE — NURSING NOTE
Patient presents in the clinic to follow on her bilateral edema.  Debora Gaspar LPN 3/8/2018 4:04 PM

## 2018-03-08 NOTE — PROGRESS NOTES
"  SUBJECTIVE:   Vikki Carrizales is a 52 year old female who presents to clinic today for the following health issues:has depressive sx and took zoloft and gained a fair amount of wt.. She has stopped it as there as not great help. We discussed adding wellbutrin to the zoloft.  Last week yaima wiseman had more edema than usual. She has put on wt and last week she had marked RAMOS and SOB. No orthopnea. No hx of heart issues but strong FH. Dad  57 AAA and brother dies of presumed cardiac issue at age 58. Aunt has CHF early 50s start. She also had ha at that time  No CP and admits she may hhave had excessive salt load          Problem list and histories reviewed & adjusted, as indicated.  Additional history: as documented        Reviewed and updated as needed this visit by clinical staff  Tobacco  Allergies  Meds  Med Hx  Surg Hx  Fam Hx  Soc Hx      Reviewed and updated as needed this visit by Provider             OBJECTIVE:     /60 (BP Location: Right arm, Patient Position: Sitting, Cuff Size: Adult Regular)  Pulse 62  Temp 97.9  F (36.6  C) (Temporal)  Ht 5' 3.5\" (1.613 m)  Wt 204 lb 3.2 oz (92.6 kg)  LMP  (LMP Unknown)  BMI 35.61 kg/m2  Body mass index is 35.61 kg/(m^2).  GENERAL: healthy, alert and no distress  RESP: lungs clear to auscultation - no rales, rhonchi or wheezes  CV: regular rate and rhythm, normal S1 S2, no S3 or S4, no murmur, click or rub, no peripheral edema and peripheral pulses strong        ASSESSMENT/PLAN:         1. Adjustment disorder with depressed mood  Offered zoloft and wellbutrin which she will consider in the Fall;   2 RAMOS - resolved; no need fro [place] at this time      See Patient Instructions    YUE LARA MD  [facility]  "

## 2018-03-09 ASSESSMENT — PATIENT HEALTH QUESTIONNAIRE - PHQ9: SUM OF ALL RESPONSES TO PHQ QUESTIONS 1-9: 12

## 2018-03-20 ENCOUNTER — MYC MEDICAL ADVICE (OUTPATIENT)
Dept: FAMILY MEDICINE | Facility: OTHER | Age: 53
End: 2018-03-20

## 2018-03-20 DIAGNOSIS — J45.909 ASTHMA: ICD-10-CM

## 2018-03-20 DIAGNOSIS — F33.9 DEPRESSION, MAJOR, RECURRENT (H): ICD-10-CM

## 2018-03-20 DIAGNOSIS — J30.89 CHRONIC NONSEASONAL ALLERGIC RHINITIS DUE TO POLLEN: ICD-10-CM

## 2018-03-20 DIAGNOSIS — J30.9 CHRONIC ALLERGIC RHINITIS, UNSPECIFIED SEASONALITY, UNSPECIFIED TRIGGER: ICD-10-CM

## 2018-03-20 DIAGNOSIS — J45.909 UNCOMPLICATED ASTHMA, UNSPECIFIED ASTHMA SEVERITY, UNSPECIFIED WHETHER PERSISTENT: ICD-10-CM

## 2018-03-20 DIAGNOSIS — J30.9 ALLERGIC RHINITIS: Primary | ICD-10-CM

## 2018-03-20 DIAGNOSIS — J30.1 CHRONIC ALLERGIC RHINITIS DUE TO POLLEN, UNSPECIFIED SEASONALITY: ICD-10-CM

## 2018-03-20 DIAGNOSIS — F32.9 REACTIVE DEPRESSION: ICD-10-CM

## 2018-04-02 RX ORDER — ALBUTEROL SULFATE 90 UG/1
2 AEROSOL, METERED RESPIRATORY (INHALATION) EVERY 6 HOURS PRN
Qty: 3 INHALER | Refills: 1 | Status: SHIPPED | OUTPATIENT
Start: 2018-04-02 | End: 2019-03-25

## 2018-04-02 NOTE — TELEPHONE ENCOUNTER
She wants to be on both Wellbutrin and Zoloft. (as discussed in last visit)    She would like albuterol rescue inhaler refilled. (added to medication list)    The Wellbutrin is not on her medication list so that will need to be added    Wanda Scherer LPN  4/2/2018  9:49 AM

## 2018-04-04 ENCOUNTER — MYC MEDICAL ADVICE (OUTPATIENT)
Dept: FAMILY MEDICINE | Facility: OTHER | Age: 53
End: 2018-04-04

## 2018-04-04 DIAGNOSIS — F41.1 GAD (GENERALIZED ANXIETY DISORDER): Primary | ICD-10-CM

## 2018-04-04 NOTE — TELEPHONE ENCOUNTER
Patient is requesting a prescription of Wellbutrin, as requested in the previous message. Please advise  Thank You  Debora Gaspar LPN 4/4/2018 11:54 AM

## 2018-04-05 RX ORDER — BUPROPION HYDROCHLORIDE 150 MG/1
150 TABLET ORAL EVERY MORNING
Qty: 90 TABLET | Refills: 3 | Status: SHIPPED | OUTPATIENT
Start: 2018-04-05 | End: 2019-03-25

## 2018-04-05 NOTE — TELEPHONE ENCOUNTER
rx sent to [facility].. The dose can be increased to 2 a day if needed after 2 weeks

## 2018-06-17 ENCOUNTER — MYC MEDICAL ADVICE (OUTPATIENT)
Dept: FAMILY MEDICINE | Facility: OTHER | Age: 53
End: 2018-06-17

## 2018-08-26 ENCOUNTER — MYC MEDICAL ADVICE (OUTPATIENT)
Dept: FAMILY MEDICINE | Facility: OTHER | Age: 53
End: 2018-08-26

## 2018-08-27 ENCOUNTER — TELEPHONE (OUTPATIENT)
Dept: FAMILY MEDICINE | Facility: OTHER | Age: 53
End: 2018-08-27

## 2019-01-28 ENCOUNTER — TELEPHONE (OUTPATIENT)
Dept: FAMILY MEDICINE | Facility: OTHER | Age: 54
End: 2019-01-28

## 2019-01-28 DIAGNOSIS — G25.81 RESTLESS LEG SYNDROME: Primary | ICD-10-CM

## 2019-01-28 DIAGNOSIS — F32.9 REACTIVE DEPRESSION: ICD-10-CM

## 2019-01-28 NOTE — LETTER
January 29, 2019      Vikki Carrizales     [facility] [place] [address]        Dear Vikki,     In the absence of Dr. Gloria, it is recommended that you call to schedule appointment with one of our other providers to discuss your medications and medication refills. At the same time, you can discuss you might intend on establishing care with.    This letter is to remind you that you are over-due for your annual exam.    Please call the clinic at [phone] to schedule your appointment.    If you should require additional refills before your scheduled appointment, please contact your pharmacy and we will refill your medication until the date of your appointment.    Thank you for choosing [facility] for your health care needs.    Sincerely,    Refill RN  [facility]

## 2019-01-28 NOTE — LETTER
January 29, 2019      Vikki Carrizales     [facility] [address]        Dear Vikki,     In the absence of Dr. Medellin, it is recommended that you call to schedule appointment with one of our other providers to discuss your medications and medication refills. At the same time, you can discuss you might intend on establishing care with.    This letter is to remind you that you are over-due for your annual exam.    Please call the clinic at [phone] to schedule your appointment.    If you should require additional refills before your scheduled appointment, please contact your pharmacy and we will refill your medication until the date of your appointment.    Thank you for choosing [facility] for your health care needs.      Refill RN  [facility]

## 2019-01-28 NOTE — TELEPHONE ENCOUNTER
[facility] sent Rx request for the following:      sertraline (ZOLOFT) 50 MG tablet  Sig: Take 1 tablet (50 mg) by mouth daily  Last Written Prescription Date:  4/5/18  Last Fill Quantity: 90,   # refills: 1    Routing refill request to provider for review/approval because:  SSRIs Protocol Failed1/28 2:40 PM   PHQ-9 score less than 5 in past 6 months    Recent (6 mo) or future (30 days) visit within the authorizing provider's specialty     traZODone (DESYREL) 50 MG tablet  Sig: Take 0.5-1 tablets (25-50 mg) by mouth At Bedtime  Last Written Prescription Date:  1/15/18  Last Fill Quantity: 30,   # refills: 6    Routing refill request to provider for review/approval because:  Serotonin Modulators Failed1/28 2:41 PM   Recent (12 mo) or future (30 days) visit within the authorizing provider's specialty     Last Office Visit: 3/8/18 (Former Dr. Medellin Patient)  Future Office visit: None.    Patient is overdue for 6-month and annual exams. In the absence of Dr. Medellin, it is recommended that Patient call to schedule appointment with one of our other providers to discuss your medications and medication refills. At the same time, Pt can discuss you might intend on establishing care with.    Unable to reach Patient with the above information. Reminder letter sent to Pt. Message sent to the primary provider's Teamlet for consideration please.      Unable to complete prescription refill per RN Medication Refill Policy. Mirna Keyes RN .............. 1/29/2019  11:40 AM

## 2019-01-29 ENCOUNTER — TELEPHONE (OUTPATIENT)
Dept: FAMILY MEDICINE | Facility: OTHER | Age: 54
End: 2019-01-29

## 2019-01-29 RX ORDER — TRAZODONE HYDROCHLORIDE 50 MG/1
25-50 TABLET, FILM COATED ORAL AT BEDTIME
Qty: 30 TABLET | Refills: 11 | Status: SHIPPED | OUTPATIENT
Start: 2019-01-29 | End: 2022-06-07

## 2019-01-30 NOTE — TELEPHONE ENCOUNTER
Patient returned call on 1/29 at 4:40 PM.    Patient is overdue for 6-month and annual exams. In the absence of Dr. Medellin, it is recommended that Patient call to schedule appointment with one of our other providers to discuss your medications and medication refills. At the same time, Pt can discuss you might intend on establishing care with.    Noted approval of medication requests:  sertraline (ZOLOFT) 50 MG tablet 90 tablet 3 1/29/2019  No   Sig - Route: Take 1 tablet (50 mg) by mouth daily - Oral     traZODone (DESYREL) 50 MG tablet 30 tablet 11 1/29/2019  No   Sig - Route: Take 0.5-1 tablets (25-50 mg) by mouth At Bedtime - Oral   [id number] - [place], [place] - [address]    Called and spoke to Patient after verifying last name and date of birth. She was notified of the above information and verbalized agreement to call back to schedule appointment before April. Mirna Keyes RN .............. 1/30/2019  11:04 AM

## 2019-03-22 ENCOUNTER — OFFICE VISIT (OUTPATIENT)
Dept: FAMILY MEDICINE | Facility: OTHER | Age: 54
End: 2019-03-22
Attending: NURSE PRACTITIONER
Payer: COMMERCIAL

## 2019-03-22 VITALS
OXYGEN SATURATION: 97 % | BODY MASS INDEX: 31.92 KG/M2 | TEMPERATURE: 98.9 F | HEART RATE: 82 BPM | DIASTOLIC BLOOD PRESSURE: 64 MMHG | RESPIRATION RATE: 18 BRPM | HEIGHT: 64 IN | SYSTOLIC BLOOD PRESSURE: 100 MMHG | WEIGHT: 187 LBS

## 2019-03-22 DIAGNOSIS — D50.9 IRON DEFICIENCY ANEMIA, UNSPECIFIED IRON DEFICIENCY ANEMIA TYPE: ICD-10-CM

## 2019-03-22 DIAGNOSIS — K59.00 CONSTIPATION, UNSPECIFIED CONSTIPATION TYPE: ICD-10-CM

## 2019-03-22 DIAGNOSIS — Z98.84 HISTORY OF ROUX-EN-Y GASTRIC BYPASS: ICD-10-CM

## 2019-03-22 DIAGNOSIS — Z00.00 ROUTINE HISTORY AND PHYSICAL EXAMINATION OF ADULT: Primary | ICD-10-CM

## 2019-03-22 DIAGNOSIS — Z12.31 ENCOUNTER FOR SCREENING MAMMOGRAM FOR BREAST CANCER: ICD-10-CM

## 2019-03-22 LAB
ALBUMIN SERPL-MCNC: 4.2 G/DL (ref 3.5–5.7)
ALP SERPL-CCNC: 70 U/L (ref 34–104)
ALT SERPL W P-5'-P-CCNC: 16 U/L (ref 7–52)
ANION GAP SERPL CALCULATED.3IONS-SCNC: 7 MMOL/L (ref 3–14)
AST SERPL W P-5'-P-CCNC: 24 U/L (ref 13–39)
BASOPHILS # BLD AUTO: 0.1 10E9/L (ref 0–0.2)
BASOPHILS NFR BLD AUTO: 1.7 %
BILIRUB SERPL-MCNC: 0.3 MG/DL (ref 0.3–1)
BUN SERPL-MCNC: 19 MG/DL (ref 7–25)
CALCIUM SERPL-MCNC: 9.5 MG/DL (ref 8.6–10.3)
CHLORIDE SERPL-SCNC: 104 MMOL/L (ref 98–107)
CHOLEST SERPL-MCNC: 150 MG/DL
CO2 SERPL-SCNC: 27 MMOL/L (ref 21–31)
CREAT SERPL-MCNC: 0.81 MG/DL (ref 0.6–1.2)
DEPRECATED CALCIDIOL+CALCIFEROL SERPL-MC: 47.3 NG/ML
DIFFERENTIAL METHOD BLD: ABNORMAL
EOSINOPHIL # BLD AUTO: 0.2 10E9/L (ref 0–0.7)
EOSINOPHIL NFR BLD AUTO: 4.2 %
ERYTHROCYTE [DISTWIDTH] IN BLOOD BY AUTOMATED COUNT: 18.7 % (ref 10–15)
GFR SERPL CREATININE-BSD FRML MDRD: 74 ML/MIN/{1.73_M2}
GLUCOSE SERPL-MCNC: 85 MG/DL (ref 70–105)
HCT VFR BLD AUTO: 28.1 % (ref 35–47)
HDLC SERPL-MCNC: 55 MG/DL (ref 23–92)
HGB BLD-MCNC: 8.1 G/DL (ref 11.7–15.7)
IMM GRANULOCYTES # BLD: 0 10E9/L (ref 0–0.4)
IMM GRANULOCYTES NFR BLD: 0.2 %
IRON SATN MFR SERPL: 2 % (ref 20–55)
IRON SERPL-MCNC: 12 UG/DL (ref 50–212)
LDLC SERPL CALC-MCNC: 80 MG/DL
LYMPHOCYTES # BLD AUTO: 2.3 10E9/L (ref 0.8–5.3)
LYMPHOCYTES NFR BLD AUTO: 39.1 %
MCH RBC QN AUTO: 21.1 PG (ref 26.5–33)
MCHC RBC AUTO-ENTMCNC: 28.8 G/DL (ref 31.5–36.5)
MCV RBC AUTO: 73 FL (ref 78–100)
MONOCYTES # BLD AUTO: 0.3 10E9/L (ref 0–1.3)
MONOCYTES NFR BLD AUTO: 5.5 %
NEUTROPHILS # BLD AUTO: 2.9 10E9/L (ref 1.6–8.3)
NEUTROPHILS NFR BLD AUTO: 49.3 %
NONHDLC SERPL-MCNC: 95 MG/DL
PLATELET # BLD AUTO: 281 10E9/L (ref 150–450)
POTASSIUM SERPL-SCNC: 4 MMOL/L (ref 3.5–5.1)
PROT SERPL-MCNC: 7.2 G/DL (ref 6.4–8.9)
RBC # BLD AUTO: 3.84 10E12/L (ref 3.8–5.2)
SODIUM SERPL-SCNC: 138 MMOL/L (ref 134–144)
TIBC SERPL-MCNC: 539 UG/DL (ref 245–400)
TRIGL SERPL-MCNC: 73 MG/DL
TSH SERPL DL<=0.05 MIU/L-ACNC: 3.45 IU/ML (ref 0.34–5.6)
UIBC (UNSATURATED): 527 MG/DL
VIT B12 SERPL-MCNC: 306 PG/ML (ref 180–914)
WBC # BLD AUTO: 5.8 10E9/L (ref 4–11)

## 2019-03-22 PROCEDURE — 90732 PPSV23 VACC 2 YRS+ SUBQ/IM: CPT

## 2019-03-22 PROCEDURE — G0472 HEP C SCREEN HIGH RISK/OTHER: HCPCS | Performed by: NURSE PRACTITIONER

## 2019-03-22 PROCEDURE — 80061 LIPID PANEL: CPT | Performed by: NURSE PRACTITIONER

## 2019-03-22 PROCEDURE — 36415 COLL VENOUS BLD VENIPUNCTURE: CPT | Performed by: NURSE PRACTITIONER

## 2019-03-22 PROCEDURE — 80053 COMPREHEN METABOLIC PANEL: CPT | Performed by: NURSE PRACTITIONER

## 2019-03-22 PROCEDURE — 82306 VITAMIN D 25 HYDROXY: CPT | Performed by: NURSE PRACTITIONER

## 2019-03-22 PROCEDURE — 99396 PREV VISIT EST AGE 40-64: CPT | Performed by: NURSE PRACTITIONER

## 2019-03-22 PROCEDURE — G0463 HOSPITAL OUTPT CLINIC VISIT: HCPCS | Mod: 25

## 2019-03-22 PROCEDURE — 87389 HIV-1 AG W/HIV-1&-2 AB AG IA: CPT | Performed by: NURSE PRACTITIONER

## 2019-03-22 PROCEDURE — G0463 HOSPITAL OUTPT CLINIC VISIT: HCPCS

## 2019-03-22 PROCEDURE — 82607 VITAMIN B-12: CPT | Performed by: NURSE PRACTITIONER

## 2019-03-22 PROCEDURE — 83540 ASSAY OF IRON: CPT | Performed by: NURSE PRACTITIONER

## 2019-03-22 PROCEDURE — 90471 IMMUNIZATION ADMIN: CPT | Performed by: NURSE PRACTITIONER

## 2019-03-22 PROCEDURE — 83550 IRON BINDING TEST: CPT | Performed by: NURSE PRACTITIONER

## 2019-03-22 PROCEDURE — 84443 ASSAY THYROID STIM HORMONE: CPT | Performed by: NURSE PRACTITIONER

## 2019-03-22 PROCEDURE — 85025 COMPLETE CBC W/AUTO DIFF WBC: CPT | Performed by: NURSE PRACTITIONER

## 2019-03-22 ASSESSMENT — MIFFLIN-ST. JEOR: SCORE: 1434.26

## 2019-03-22 ASSESSMENT — ANXIETY QUESTIONNAIRES
IF YOU CHECKED OFF ANY PROBLEMS ON THIS QUESTIONNAIRE, HOW DIFFICULT HAVE THESE PROBLEMS MADE IT FOR YOU TO DO YOUR WORK, TAKE CARE OF THINGS AT HOME, OR GET ALONG WITH OTHER PEOPLE: SOMEWHAT DIFFICULT
2. NOT BEING ABLE TO STOP OR CONTROL WORRYING: NEARLY EVERY DAY
3. WORRYING TOO MUCH ABOUT DIFFERENT THINGS: NEARLY EVERY DAY
1. FEELING NERVOUS, ANXIOUS, OR ON EDGE: MORE THAN HALF THE DAYS
7. FEELING AFRAID AS IF SOMETHING AWFUL MIGHT HAPPEN: NOT AT ALL
6. BECOMING EASILY ANNOYED OR IRRITABLE: NOT AT ALL
GAD7 TOTAL SCORE: 8
5. BEING SO RESTLESS THAT IT IS HARD TO SIT STILL: NOT AT ALL

## 2019-03-22 ASSESSMENT — PATIENT HEALTH QUESTIONNAIRE - PHQ9
5. POOR APPETITE OR OVEREATING: NOT AT ALL
SUM OF ALL RESPONSES TO PHQ QUESTIONS 1-9: 6

## 2019-03-22 ASSESSMENT — PAIN SCALES - GENERAL: PAINLEVEL: MODERATE PAIN (4)

## 2019-03-22 NOTE — PROGRESS NOTES
SUBJECTIVE:   CC: Vikki Carrizales is an 53 year old woman who presents for preventive health visit.     Healthy Habits:    Do you get at least three servings of calcium containing foods daily (dairy, green leafy vegetables, etc.)? yes    Amount of exercise or daily activities, outside of work: none, works a busy job for 6.25 hours every day and leads an active lifestyle working outside with     Problems taking medications regularly No    Medication side effects: No    Have you had an eye exam in the past two years? no    Do you see a dentist twice per year? yes    Do you have sleep apnea, excessive snoring or daytime drowsiness?no      Back Pain  Duration of complaint: 3 weeks   Specific cause: lifting a stack of pans out of cabinet, bent over  Description:   Location of pain: low back midline  Character of pain: dull ache and waxing and waning  Pain radiation:none  Intensity: mild  Accompanying Signs & Symptoms:  Fever: no  Numbness or weakness in legs:  no  Dysuria or Hematuria: no  Bowel or bladder incontinence: no  History:   Any injury (lifting, bending, twisting): YES  Work Injury: no  History of back problems: no prior back problems  Any previous MRI or X-rays: no  Any history of back surgery: no  Any cancer history: no  Precipitating factors:   Worsened by: rising from laying down or getting out of a chair.  Alleviating factors:  Improved by: heat, position  Therapies Tried and outcome: stretching, cold therapy, heat therapy and NSAIDS      Constipation  Duration of complaint: 3 weeks   Description:   Frequency of bowel movements: every other day with difficulty completing evacuation.  Stool consistency: hard, small caliber  Progression of Symptoms:  same and waxing and waning; has had 2 bouts of diarrhea in the last week  Accompanying Signs & Symptoms:  Abdominal pain (cramping?): no  Blood in stool: no  Rectal pain: no  Nausea/vomiting: no  Weight loss or gain: no  History:   History of abdominal  surgery: YES- bypass 16 yrs ago  Precipitating factors:   Recent use of narcotics, anticholinergics, calcium channel blockers, antacids, or iron supplements: YES- antacids  Chronic Laxative Use: no  Therapies Tried and outcome: increase in fluids, laxatives and stool softeners    Today's PHQ-2 Score: No flowsheet data found.    Abuse: Current or Past(Physical, Sexual or Emotional)- No  Do you feel safe in your environment? Yes    Social History     Tobacco Use     Smoking status: Never Smoker     Smokeless tobacco: Never Used   Substance Use Topics     Alcohol use: Yes     Comment: moderate, about 1 bottle of wine per week     If you drink alcohol do you typically have >3 drinks per day or >7 drinks per week? No                     Reviewed orders with patient.  Reviewed health maintenance and updated orders accordingly - Yes  Labs reviewed in EPIC  BP Readings from Last 3 Encounters:   19 100/64   18 110/60   18 102/64    Wt Readings from Last 3 Encounters:   19 84.8 kg (187 lb)   18 92.6 kg (204 lb 3.2 oz)   18 94.1 kg (207 lb 6.4 oz)                  Patient Active Problem List   Diagnosis     Allergic rhinitis     Anemia, iron deficiency     Asthma     Depression, major, recurrent (H)     Genital herpes     Esophageal reflux     Menorrhagia with irregular cycle     Restless leg syndrome     Non morbid obesity due to excess calories     Thrombophlebitis leg     Venous insufficiency     Vitamin B12 deficiency     Abscess     Past Surgical History:   Procedure Laterality Date      SECTION      ,,HELLP syndrome, Platelet transfusions; son       SECTION           GASTRIC BYPASS       LAPAROSCOPIC BYPASS GASTRIC      , Lenore en Y Gastric Bypass, Dr Gabriel Torres, [facility]       Social History     Tobacco Use     Smoking status: Never Smoker     Smokeless tobacco: Never Used   Substance Use Topics     Alcohol use: Yes     Comment:  moderate, about 1 bottle of wine per week     Family History   Problem Relation Age of Onset     Other - See Comments Mother      Aortic aneurysm Father      Arrhythmia Father      Diabetes Maternal Grandmother         Diabetes     Other - See Comments Maternal Grandmother         Stroke     Other - See Comments Maternal Grandfather         Depression     Osteoporosis Maternal Grandfather         Osteoporosis     Diabetes Maternal Aunt         Diabetes     Other - See Comments Sister         Thrombophlebitis     Heart Disease Brother         Heart Disease     Breast Cancer No family hx of         Cancer-breast         Current Outpatient Medications   Medication Sig Dispense Refill     albuterol (PROAIR HFA/PROVENTIL HFA/VENTOLIN HFA) 108 (90 BASE) MCG/ACT Inhaler Inhale 2 puffs into the lungs every 6 hours as needed for shortness of breath / dyspnea or wheezing 3 Inhaler 1     buPROPion (WELLBUTRIN XL) 150 MG 24 hr tablet Take 1 tablet (150 mg) by mouth every morning 90 tablet 3     fluticasone (FLONASE) 50 MCG/ACT spray Spray 2 sprays in nostril daily       sertraline (ZOLOFT) 50 MG tablet Take 1 tablet (50 mg) by mouth daily 90 tablet 3     traZODone (DESYREL) 50 MG tablet Take 0.5-1 tablets (25-50 mg) by mouth At Bedtime 30 tablet 11     Allergies   Allergen Reactions     Seasonal Allergies      seasonal       Mammogram Screening: Patient over age 50, mutual decision to screen reflected in health maintenance.    Pertinent mammograms are reviewed under the imaging tab.  History of abnormal Pap smear: NO - age 30-65 PAP every 5 years with negative HPV co-testing recommended     Reviewed and updated as needed this visit by clinical staff  Tobacco  Allergies  Meds  Problems  Med Hx  Surg Hx  Fam Hx  Soc Hx          Reviewed and updated as needed this visit by Provider  Tobacco  Allergies  Meds  Problems  Med Hx  Surg Hx  Fam Hx  Soc Hx         Past Medical History:   Diagnosis Date     Anemia       "Anxiety      Cutaneous abscess     2018     Gastro-esophageal reflux disease without esophagitis          Herpesviral infection of urogenital system     No Comments Provided     Impetigo     recurrent - as a child     Major depressive disorder, single episode     ,Rx: zoloft, citalopram and prozac in the past, none since )     Other seasonal allergic rhinitis     lifelong     Personal history of other medical treatment (CODE)      1 child  in childbirth (); one living child (1996)     Uncomplicated asthma          Vitamin D deficiency     No Comments Provided        ROS:  CONSTITUTIONAL: NEGATIVE for fever, chills, change in weight  INTEGUMENTARY/SKIN: NEGATIVE for worrisome rashes, moles or lesions  EYES: NEGATIVE for vision changes or irritation  ENT: NEGATIVE for ear, mouth and throat problems  RESP: NEGATIVE for significant cough or SOB  BREAST: NEGATIVE for masses, tenderness or discharge  CV: NEGATIVE for chest pain, palpitations or peripheral edema  GI: POSITIVE for constipation and heartburn or reflux   menopausal female: continues to have vaginal bleeding, 4 times in the last year  MUSCULOSKELETAL: NEGATIVE for significant arthralgias or myalgia  NEURO: NEGATIVE for weakness, dizziness or paresthesias  ENDOCRINE: NEGATIVE for temperature intolerance, skin/hair changes  HEME/ALLERGY/IMMUNE: NEGATIVE for bleeding problems  PSYCHIATRIC: NEGATIVE for changes in mood or affect     OBJECTIVE:   /64   Pulse 82   Temp 98.9  F (37.2  C) (Temporal)   Resp 18   Ht 1.619 m (5' 3.75\")   Wt 84.8 kg (187 lb)   SpO2 97%   BMI 32.35 kg/m    EXAM:  GENERAL: healthy, alert and no distress  EYES: Eyes grossly normal to inspection, PERRL and conjunctivae and sclerae normal  HENT: ear canals and TM's normal, nose and mouth without ulcers or lesions  NECK: no adenopathy, no asymmetry, masses, or scars and thyroid normal to palpation  RESP: lungs clear to auscultation - no rales, " rhonchi or wheezes  CV: regular rate and rhythm, normal S1 S2, no S3 or S4, no murmur, click or rub, no peripheral edema and peripheral pulses strong  ABDOMEN: soft, nontender, no hepatosplenomegaly, no masses and bowel sounds normal  MS: no gross musculoskeletal defects noted, no edema  SKIN: no suspicious lesions or rashes  NEURO: Normal strength and tone, mentation intact and speech normal  Comprehensive back pain exam:  Tenderness of lumbar spine, Range of motion not limited by pain, Lower extremity strength functional and equal on both sides, Lower extremity reflexes within normal limits bilaterally, Lower extremity sensation normal and equal on both sides and Straight leg raise negative bilaterally  PSYCH: mentation appears normal, affect normal/bright    Diagnostic Test Results:  Results for orders placed or performed in visit on 03/22/19   Vitamin B12   Result Value Ref Range    Vitamin B12 306 180 - 914 pg/mL   Vitamin D Total   Result Value Ref Range    Vitamin D Total 47.3 ng/mL   Iron Binding Panel   Result Value Ref Range    Iron 12 (L) 50 - 212 ug/dL    UIBC (Unsaturated) 527.00 mg/dL    Iron Binding Capacity 539.00 (H) 245.00 - 400.00 ug/dL    Iron Saturation 2 (L) 20 - 55 %   Lipid Profile   Result Value Ref Range    Cholesterol 150 <200 mg/dL    Triglycerides 73 <150 mg/dL    HDL Cholesterol 55 23 - 92 mg/dL    LDL Cholesterol Calculated 80 <100 mg/dL    Non HDL Cholesterol 95 <130 mg/dL   Thyrotropin GH   Result Value Ref Range    Thyrotropin 3.45 0.34 - 5.60 IU/mL   Comprehensive Metabolic Panel   Result Value Ref Range    Sodium 138 134 - 144 mmol/L    Potassium 4.0 3.5 - 5.1 mmol/L    Chloride 104 98 - 107 mmol/L    Carbon Dioxide 27 21 - 31 mmol/L    Anion Gap 7 3 - 14 mmol/L    Glucose 85 70 - 105 mg/dL    Urea Nitrogen 19 7 - 25 mg/dL    Creatinine 0.81 0.60 - 1.20 mg/dL    GFR Estimate 74 >60 mL/min/[1.73_m2]    GFR Estimate If Black 90 >60 mL/min/[1.73_m2]    Calcium 9.5 8.6 - 10.3 mg/dL     Bilirubin Total 0.3 0.3 - 1.0 mg/dL    Albumin 4.2 3.5 - 5.7 g/dL    Protein Total 7.2 6.4 - 8.9 g/dL    Alkaline Phosphatase 70 34 - 104 U/L    ALT 16 7 - 52 U/L    AST 24 13 - 39 U/L   CBC with platelets differential   Result Value Ref Range    WBC 5.8 4.0 - 11.0 10e9/L    RBC Count 3.84 3.8 - 5.2 10e12/L    Hemoglobin 8.1 (L) 11.7 - 15.7 g/dL    Hematocrit 28.1 (L) 35.0 - 47.0 %    MCV 73 (L) 78 - 100 fl    MCH 21.1 (L) 26.5 - 33.0 pg    MCHC 28.8 (L) 31.5 - 36.5 g/dL    RDW 18.7 (H) 10.0 - 15.0 %    Platelet Count 281 150 - 450 10e9/L    Diff Method Automated Method     % Neutrophils 49.3 %    % Lymphocytes 39.1 %    % Monocytes 5.5 %    % Eosinophils 4.2 %    % Basophils 1.7 %    % Immature Granulocytes 0.2 %    Absolute Neutrophil 2.9 1.6 - 8.3 10e9/L    Absolute Lymphocytes 2.3 0.8 - 5.3 10e9/L    Absolute Monocytes 0.3 0.0 - 1.3 10e9/L    Absolute Eosinophils 0.2 0.0 - 0.7 10e9/L    Absolute Basophils 0.1 0.0 - 0.2 10e9/L    Abs Immature Granulocytes 0.0 0 - 0.4 10e9/L        ASSESSMENT/PLAN:   1. Routine history and physical examination of adult  Labs as above.   - CBC with platelets differential; Future  - Comprehensive Metabolic Panel; Future  - Thyrotropin GH; Future  - Lipid Profile; Future  - Vitamin D Total; Future  - HIV Antigen Antibody Combo; Future  - Hepatitis C Screen Reflex to HCV RNA Quant and Genotype; Future  - Hepatitis C Screen Reflex to HCV RNA Quant and Genotype  - HIV Antigen Antibody Combo  - Vitamin D Total  - Lipid Profile  - Thyrotropin GH  - Comprehensive Metabolic Panel  - CBC with platelets differential    2. History of Lenore-en-Y gastric bypass  Has not required iron infusions in the past.   - Iron Binding Panel; Future  - Vitamin B12; Future  - Vitamin B12  - Iron Binding Panel    3. Encounter for screening mammogram for breast cancer  Will schedule when able.   - MA Screen Bilateral w/Silviano; Future    4. Constipation, unspecified constipation type  Acute, though without any  "obvious reason. No family history of colon CA. History of bypass as above. Has not had colonoscopy yet. Will start with colonoscopy and miralax 17g daily.   - GENERAL SURG ADULT REFERRAL    5. Iron deficiency anemia, unspecified iron deficiency anemia type  Fairly severe iron deficiency. Considering history of bypass and current issues with constipation, will set up with iron infusions. Patient concerned about insurance coverage and will check to make sure it will be covered.       COUNSELING:   Reviewed preventive health counseling, as reflected in patient instructions       Regular exercise       Healthy diet/nutrition       Vision screening       Immunizations    Vaccinated for: Pneumococcal             Aspirin Prophylaxsis       Osteoporosis Prevention/Bone Health       Colon cancer screening       Consider Hep C screening for patients born between 1945 and        HIV screeninx in teen years, 1x in adult years, and at intervals if high risk       (Hemalatha)menopause management       Advance Care Planning    BP Readings from Last 1 Encounters:   19 100/64     Estimated body mass index is 32.35 kg/m  as calculated from the following:    Height as of this encounter: 1.619 m (5' 3.75\").    Weight as of this encounter: 84.8 kg (187 lb).      Weight management plan: Discussed healthy diet and exercise guidelines     reports that  has never smoked. she has never used smokeless tobacco.      Counseling Resources:  ATP IV Guidelines  Pooled Cohorts Equation Calculator  Breast Cancer Risk Calculator  FRAX Risk Assessment  ICSI Preventive Guidelines  Dietary Guidelines for Americans, 2010  USDA's MyPlate  ASA Prophylaxis  Lung CA Screening    Trina Durbin NP  [facility]  "

## 2019-03-22 NOTE — NURSING NOTE
"Chief Complaint   Patient presents with     Physical     annual , establish care     Dental Problem     Has been having diarrhea and constipation x 1 month.  Declines a pap this visit.  Low back pain and constipation started at the same time frame.    Initial /64   Pulse 82   Temp 98.9  F (37.2  C) (Temporal)   Resp 18   Ht 1.619 m (5' 3.75\")   Wt 84.8 kg (187 lb)   SpO2 97%   BMI 32.35 kg/m   Estimated body mass index is 32.35 kg/m  as calculated from the following:    Height as of this encounter: 1.619 m (5' 3.75\").    Weight as of this encounter: 84.8 kg (187 lb).    Medication Reconciliation: complete      Norma J. Gosselin, LPN  "

## 2019-03-23 ASSESSMENT — ASTHMA QUESTIONNAIRES: ACT_TOTALSCORE: 25

## 2019-03-23 ASSESSMENT — ANXIETY QUESTIONNAIRES: GAD7 TOTAL SCORE: 8

## 2019-03-25 ENCOUNTER — OFFICE VISIT (OUTPATIENT)
Dept: FAMILY MEDICINE | Facility: OTHER | Age: 54
End: 2019-03-25
Attending: NURSE PRACTITIONER
Payer: COMMERCIAL

## 2019-03-25 ENCOUNTER — HOSPITAL ENCOUNTER (OUTPATIENT)
Dept: MAMMOGRAPHY | Facility: OTHER | Age: 54
Discharge: HOME | End: 2019-03-25
Attending: NURSE PRACTITIONER | Admitting: NURSE PRACTITIONER
Payer: COMMERCIAL

## 2019-03-25 VITALS
TEMPERATURE: 99.3 F | DIASTOLIC BLOOD PRESSURE: 80 MMHG | RESPIRATION RATE: 20 BRPM | OXYGEN SATURATION: 97 % | SYSTOLIC BLOOD PRESSURE: 130 MMHG | WEIGHT: 187 LBS | HEART RATE: 80 BPM | HEIGHT: 64 IN | BODY MASS INDEX: 31.92 KG/M2

## 2019-03-25 DIAGNOSIS — Z12.4 SCREENING FOR CERVICAL CANCER: Primary | ICD-10-CM

## 2019-03-25 DIAGNOSIS — Z12.31 ENCOUNTER FOR SCREENING MAMMOGRAM FOR BREAST CANCER: ICD-10-CM

## 2019-03-25 DIAGNOSIS — J45.909 UNCOMPLICATED ASTHMA, UNSPECIFIED ASTHMA SEVERITY, UNSPECIFIED WHETHER PERSISTENT: ICD-10-CM

## 2019-03-25 DIAGNOSIS — F41.1 GAD (GENERALIZED ANXIETY DISORDER): ICD-10-CM

## 2019-03-25 PROCEDURE — G0463 HOSPITAL OUTPT CLINIC VISIT: HCPCS

## 2019-03-25 PROCEDURE — 77067 SCR MAMMO BI INCL CAD: CPT

## 2019-03-25 PROCEDURE — G0123 SCREEN CERV/VAG THIN LAYER: HCPCS | Performed by: NURSE PRACTITIONER

## 2019-03-25 PROCEDURE — 99213 OFFICE O/P EST LOW 20 MIN: CPT | Performed by: NURSE PRACTITIONER

## 2019-03-25 PROCEDURE — 87624 HPV HI-RISK TYP POOLED RSLT: CPT | Performed by: NURSE PRACTITIONER

## 2019-03-25 PROCEDURE — 88142 CYTOPATH C/V THIN LAYER: CPT | Performed by: NURSE PRACTITIONER

## 2019-03-25 PROCEDURE — G0463 HOSPITAL OUTPT CLINIC VISIT: HCPCS | Mod: 25

## 2019-03-25 RX ORDER — FLUTICASONE PROPIONATE 50 MCG
2 SPRAY, SUSPENSION (ML) NASAL DAILY
Qty: 18.2 ML | Refills: 11 | Status: SHIPPED | OUTPATIENT
Start: 2019-03-25 | End: 2022-06-07

## 2019-03-25 RX ORDER — BUPROPION HYDROCHLORIDE 150 MG/1
150 TABLET ORAL EVERY MORNING
Qty: 90 TABLET | Refills: 3 | Status: SHIPPED | OUTPATIENT
Start: 2019-03-25 | End: 2019-08-28

## 2019-03-25 RX ORDER — ALBUTEROL SULFATE 90 UG/1
2 AEROSOL, METERED RESPIRATORY (INHALATION) EVERY 6 HOURS PRN
Qty: 18 G | Refills: 11 | Status: SHIPPED | OUTPATIENT
Start: 2019-03-25 | End: 2020-04-28

## 2019-03-25 ASSESSMENT — PAIN SCALES - GENERAL: PAINLEVEL: NO PAIN (0)

## 2019-03-25 ASSESSMENT — MIFFLIN-ST. JEOR: SCORE: 1434.26

## 2019-03-25 NOTE — NURSING NOTE
"Chief Complaint   Patient presents with     Gyn Exam     Pap only         Initial /80   Pulse 80   Temp 99.3  F (37.4  C) (Temporal)   Resp 20   Ht 1.619 m (5' 3.75\")   Wt 84.8 kg (187 lb)   SpO2 97%   BMI 32.35 kg/m   Estimated body mass index is 32.35 kg/m  as calculated from the following:    Height as of this encounter: 1.619 m (5' 3.75\").    Weight as of this encounter: 84.8 kg (187 lb).    Medication Reconciliation: complete      Norma J. Gosselin, LPN  "

## 2019-03-26 LAB
HCV AB SERPL QL IA: NONREACTIVE
HIV 1+2 AB+HIV1 P24 AG SERPL QL IA: NONREACTIVE

## 2019-03-26 RX ORDER — ACETAMINOPHEN 325 MG/1
650 TABLET ORAL
Status: CANCELLED
Start: 2019-03-26

## 2019-03-26 RX ORDER — DIPHENHYDRAMINE HCL 25 MG
25 CAPSULE ORAL
Status: CANCELLED | OUTPATIENT
Start: 2019-03-26

## 2019-03-26 NOTE — PROGRESS NOTES
SUBJECTIVE:   Vikki Carrizales is a 53 year old female who presents to clinic today for the following health issues:    Need for pap  Patient is due for a pap, last done 14. All paps have been normal, though last pap did not have cotesting completed. Is perimenopausal, bleeding approx 4/year. No changes in vaginal discharge, no odor, no itching or other complaints.     Problem list and histories reviewed & adjusted, as indicated.  Additional history: as documented    Patient Active Problem List   Diagnosis     Allergic rhinitis     Anemia, iron deficiency     Asthma     Depression, major, recurrent (H)     Genital herpes     Esophageal reflux     Menorrhagia with irregular cycle     Restless leg syndrome     Non morbid obesity due to excess calories     Thrombophlebitis leg     Venous insufficiency     Vitamin B12 deficiency     Abscess     Past Surgical History:   Procedure Laterality Date      SECTION      ,,HELLP syndrome, Platelet transfusions; son       SECTION           GASTRIC BYPASS       LAPAROSCOPIC BYPASS GASTRIC      , Lenore en Y Gastric Bypass, Dr Gabriel Torres, [facility]       Social History     Tobacco Use     Smoking status: Never Smoker     Smokeless tobacco: Never Used   Substance Use Topics     Alcohol use: Yes     Comment: moderate, about 1 bottle of wine per week     Family History   Problem Relation Age of Onset     Other - See Comments Mother      Aortic aneurysm Father      Arrhythmia Father      Diabetes Maternal Grandmother         Diabetes     Other - See Comments Maternal Grandmother         Stroke     Other - See Comments Maternal Grandfather         Depression     Osteoporosis Maternal Grandfather         Osteoporosis     Diabetes Maternal Aunt         Diabetes     Other - See Comments Sister         Thrombophlebitis     Heart Disease Brother         Heart Disease     Breast Cancer No family hx of         Cancer-breast         Current  "Outpatient Medications   Medication Sig Dispense Refill     albuterol (PROAIR HFA/PROVENTIL HFA/VENTOLIN HFA) 108 (90 Base) MCG/ACT inhaler Inhale 2 puffs into the lungs every 6 hours as needed for shortness of breath / dyspnea or wheezing 18 g 11     buPROPion (WELLBUTRIN XL) 150 MG 24 hr tablet Take 1 tablet (150 mg) by mouth every morning 90 tablet 3     fluticasone (FLONASE) 50 MCG/ACT nasal spray Spray 2 sprays in nostril daily 18.2 mL 11     sertraline (ZOLOFT) 50 MG tablet Take 1 tablet (50 mg) by mouth daily 90 tablet 3     traZODone (DESYREL) 50 MG tablet Take 0.5-1 tablets (25-50 mg) by mouth At Bedtime 30 tablet 11     Allergies   Allergen Reactions     Seasonal Allergies      seasonal       Reviewed and updated as needed this visit by clinical staff  Tobacco  Allergies  Meds  Med Hx  Surg Hx  Fam Hx  Soc Hx      Reviewed and updated as needed this visit by Provider         ROS:  As above    OBJECTIVE:     /80   Pulse 80   Temp 99.3  F (37.4  C) (Temporal)   Resp 20   Ht 1.619 m (5' 3.75\")   Wt 84.8 kg (187 lb)   SpO2 97%   BMI 32.35 kg/m    Body mass index is 32.35 kg/m .  GENERAL: healthy, alert and no distress  ABDOMEN: soft, nontender, no hepatosplenomegaly, no masses and bowel sounds normal   (female): normal female external genitalia, normal urethral meatus, vaginal mucosa, normal cervix/adnexa/uterus without masses or discharge    Diagnostic Test Results:  Pap and HPV testing pending.     ASSESSMENT/PLAN:     1. Screening for cervical cancer  Pap obtained today, results pending. Will notify patient of recommendations when results available.   - HPV High Risk Types DNA Cervical  - Pap Screen Thin Prep with HPV - recommended age 30 - 65 years (select HPV order below)    2. Uncomplicated asthma, unspecified asthma severity, unspecified whether persistent  Medications refilled.   - albuterol (PROAIR HFA/PROVENTIL HFA/VENTOLIN HFA) 108 (90 Base) MCG/ACT inhaler; Inhale 2 puffs into " the lungs every 6 hours as needed for shortness of breath / dyspnea or wheezing  Dispense: 18 g; Refill: 11  - fluticasone (FLONASE) 50 MCG/ACT nasal spray; Spray 2 sprays in nostril daily  Dispense: 18.2 mL; Refill: 11    3. VALERIE (generalized anxiety disorder)  Medication refilled.   - buPROPion (WELLBUTRIN XL) 150 MG 24 hr tablet; Take 1 tablet (150 mg) by mouth every morning  Dispense: 90 tablet; Refill: 3    Trina Durbin NP  [facility] [place]

## 2019-03-28 LAB
COPATH REPORT: NORMAL
PAP: NORMAL

## 2019-04-01 ENCOUNTER — OFFICE VISIT (OUTPATIENT)
Dept: SURGERY | Facility: OTHER | Age: 54
End: 2019-04-01
Attending: NURSE PRACTITIONER
Payer: COMMERCIAL

## 2019-04-01 VITALS
BODY MASS INDEX: 32.03 KG/M2 | HEIGHT: 64 IN | DIASTOLIC BLOOD PRESSURE: 80 MMHG | RESPIRATION RATE: 20 BRPM | HEART RATE: 80 BPM | SYSTOLIC BLOOD PRESSURE: 122 MMHG | WEIGHT: 187.6 LBS

## 2019-04-01 DIAGNOSIS — K59.00 CONSTIPATION, UNSPECIFIED CONSTIPATION TYPE: Primary | ICD-10-CM

## 2019-04-01 DIAGNOSIS — Z00.00 HEALTHCARE MAINTENANCE: ICD-10-CM

## 2019-04-01 PROCEDURE — 99204 OFFICE O/P NEW MOD 45 MIN: CPT | Performed by: SURGERY

## 2019-04-01 PROCEDURE — G0463 HOSPITAL OUTPT CLINIC VISIT: HCPCS

## 2019-04-01 ASSESSMENT — MIFFLIN-ST. JEOR: SCORE: 1436.98

## 2019-04-01 ASSESSMENT — PAIN SCALES - GENERAL: PAINLEVEL: NO PAIN (0)

## 2019-04-01 NOTE — NURSING NOTE
"Chief Complaint   Patient presents with     Consult     constpation       Initial /80 (BP Location: Right arm, Patient Position: Sitting, Cuff Size: Adult Large)   Pulse 80   Resp 20   Ht 1.619 m (5' 3.75\")   Wt 85.1 kg (187 lb 9.6 oz)   Breastfeeding? No   BMI 32.45 kg/m   Estimated body mass index is 32.45 kg/m  as calculated from the following:    Height as of this encounter: 1.619 m (5' 3.75\").    Weight as of this encounter: 85.1 kg (187 lb 9.6 oz).  Medication Reconciliation: complete    Cynthia King LPN  "

## 2019-04-01 NOTE — PROGRESS NOTES
GENERAL SURGERY CONSULTATION NOTE    Vikki Carrizales   PO   [facility] [address]  53 year old  female    Primary Care Provider:  No Ref-Primary, Physician      HPI: Vikki Carrizales presents to clinic with constipation.  Patient notes that she hurt her back several weeks ago and was during this time that she became quite constipated.  She would sit down at the toilet and push for quite a long time with no results.  She became uncomfortable and abdominal pain and bloating.  She also says that the back pain was resolved with heat therapy and when her back pain resolved her constipation resolved shortly thereafter.  She is now having regular, soft bowel movements.  She has 1-2 bowel movements a day.  This is been her normal pattern throughout her life.  She denies blood in her stool, weight loss, thin caliber stools.  She is no family history of colon cancer.  She believes her brother might have had Crohn disease, but he  of cardiovascular disease recently.  Patient is also curious about screening colonoscopy.  She is recently had a few family members die before the age of 60 and she is gained a renewed interest in her health.    REVIEW OF SYSTEMS:    GENERAL: No fevers or chills. Denies fatigue, recent weight loss.  HEENT: No sinus drainage. No changes with vision or hearing. No difficulty swallowing.   LYMPHATICS:  Noswollen nodes in axilla, neck or groin.  CARDIOVASCULAR: Denies chest pain, palpitations and dyspnea on exertion.  PULMONARY: No shortness of breath or cough. No increase in sputum production.  GI: Denies melena,bright red blood in stools. No hematemesis. No constipation or diarrhea.  : No dysuria or hematuria.  SKIN: No recent rashes or ulcers.   HEMATOLOGY:  No history of easy bruising or bleeding.  ENDOCRINE:  No history of diabetes or thyroid problems.  NEUROLOGY:  No history of seizures or headaches. No motor or sensory changes.        Patient Active Problem List   Diagnosis      Allergic rhinitis     Anemia, iron deficiency     Asthma     Depression, major, recurrent (H)     Genital herpes     Esophageal reflux     Menorrhagia with irregular cycle     Restless leg syndrome     Non morbid obesity due to excess calories     Thrombophlebitis leg     Venous insufficiency     Vitamin B12 deficiency     Abscess       Past Medical History:   Diagnosis Date     Anemia      Anxiety      Cutaneous abscess     2018     Gastro-esophageal reflux disease without esophagitis          Herpesviral infection of urogenital system     No Comments Provided     Impetigo     recurrent - as a child     Major depressive disorder, single episode     ,Rx: zoloft, citalopram and prozac in the past, none since )     Other seasonal allergic rhinitis     lifelong     Personal history of other medical treatment (CODE)      1 child  in childbirth (); one living child (1996)     Uncomplicated asthma          Vitamin D deficiency     No Comments Provided       Past Surgical History:   Procedure Laterality Date      SECTION      ,,HELLP syndrome, Platelet transfusions; son       SECTION           GASTRIC BYPASS       LAPAROSCOPIC BYPASS GASTRIC      , Lenore en Y Gastric Bypass, Dr Gabriel Torres, [facility]       Family History   Problem Relation Age of Onset     Other - See Comments Mother      Aortic aneurysm Father      Arrhythmia Father      Diabetes Maternal Grandmother         Diabetes     Other - See Comments Maternal Grandmother         Stroke     Other - See Comments Maternal Grandfather         Depression     Osteoporosis Maternal Grandfather         Osteoporosis     Diabetes Maternal Aunt         Diabetes     Other - See Comments Sister         Thrombophlebitis     Heart Disease Brother         Heart Disease     Breast Cancer No family hx of         Cancer-breast       Social History     Social History Narrative     on 12/21/10  after 8 year relationship (1st marriage) - Vinay Ashley son (Genaro rdz 4-18-96), from previous relationship.      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     Her mother moved back to  in April, 2011    Preload  9/10/2013    **pre upload 11/30/2012**       Social History     Socioeconomic History     Marital status:      Spouse name: Not on file     Number of children: Not on file     Years of education: Not on file     Highest education level: Not on file   Occupational History     Not on file   Social Needs     Financial resource strain: Not on file     Food insecurity:     Worry: Not on file     Inability: Not on file     Transportation needs:     Medical: Not on file     Non-medical: Not on file   Tobacco Use     Smoking status: Never Smoker     Smokeless tobacco: Never Used   Substance and Sexual Activity     Alcohol use: Yes     Comment: moderate, about 1 bottle of wine per week     Drug use: No     Sexual activity: Yes     Partners: Male     Birth control/protection: Male Surgical   Lifestyle     Physical activity:     Days per week: Not on file     Minutes per session: Not on file     Stress: Not on file   Relationships     Social connections:     Talks on phone: Not on file     Gets together: Not on file     Attends Hinduism service: Not on file     Active member of club or organization: Not on file     Attends meetings of clubs or organizations: Not on file     Relationship status: Not on file     Intimate partner violence:     Fear of current or ex partner: Not on file     Emotionally abused: Not on file     Physically abused: Not on file     Forced sexual activity: Not on file   Other Topics Concern     Parent/sibling w/ CABG, MI or angioplasty before 65F 55M? Not Asked   Social History Narrative     on 12/21/10 after 8 year relationship (1st marriage) - Vinay Ashley son (Genaro rdz 4-18-96), from previous relationship.      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     Her mother moved back to  in April, 2011    Preload  9/10/2013    **pre upload 11/30/2012**         Current Outpatient Medications on File Prior to Visit:  albuterol (PROAIR HFA/PROVENTIL HFA/VENTOLIN HFA) 108 (90 Base) MCG/ACT inhaler Inhale 2 puffs into the lungs every 6 hours as needed for shortness of breath / dyspnea or wheezing   buPROPion (WELLBUTRIN XL) 150 MG 24 hr tablet Take 1 tablet (150 mg) by mouth every morning   fluticasone (FLONASE) 50 MCG/ACT nasal spray Spray 2 sprays in nostril daily   sertraline (ZOLOFT) 50 MG tablet Take 1 tablet (50 mg) by mouth daily   traZODone (DESYREL) 50 MG tablet Take 0.5-1 tablets (25-50 mg) by mouth At Bedtime     No current facility-administered medications on file prior to visit.       ALLERGIES/SENSITIVITIES:   Allergies   Allergen Reactions     Seasonal Allergies      seasonal       PHYSICAL EXAM:     /80 (BP Location: Right arm, Patient Position: Sitting, Cuff Size: Adult Large)   Pulse 80   Resp 20   Ht 1.619 m (5' 3.75\")   Wt 85.1 kg (187 lb 9.6 oz)   Breastfeeding? No   BMI 32.45 kg/m      General Appearance:   Sitting up in the chair, no apparent distress  HEENT: Pupils are equal and reactive, no scleral icterus,   Heart & CV:  RRR no murmur.  Intact distal pulses, good cap refill.  LUNGS: No increased work of breathing.Lugns are CTA B/L, no wheezing or crackles.  Abd: Soft, nontender, nondistended  Ext: Normal bulk and tone, no lower extremity edema  Neuro: Alert and oriented, normal speech mentation        CONSULTATION ASSESSMENT AND PLAN:    53 year old female with resolved constipation that is interested in screening colonoscopy.  The technical details of colonoscopy were discussed with the patient along with the risks and benefits to include bleeding, perforation, incomplete study.  She demonstrated understanding is willing to proceed.  She is a normal risk individual for colon cancer. "     Schedule for screening colonoscopy at the patient's convenience.        Miguel A Yadav MD on 4/1/2019 at 4:43 PM

## 2019-04-02 LAB
FINAL DIAGNOSIS: NORMAL
HPV HR 12 DNA CVX QL NAA+PROBE: NEGATIVE
HPV16 DNA SPEC QL NAA+PROBE: NEGATIVE
HPV18 DNA SPEC QL NAA+PROBE: NEGATIVE
SPECIMEN DESCRIPTION: NORMAL
SPECIMEN SOURCE CVX/VAG CYTO: NORMAL

## 2019-04-03 ENCOUNTER — HOSPITAL ENCOUNTER (OUTPATIENT)
Dept: INFUSION THERAPY | Facility: OTHER | Age: 54
Discharge: HOME | End: 2019-04-03
Attending: NURSE PRACTITIONER | Admitting: NURSE PRACTITIONER
Payer: COMMERCIAL

## 2019-04-03 VITALS — SYSTOLIC BLOOD PRESSURE: 131 MMHG | HEART RATE: 73 BPM | DIASTOLIC BLOOD PRESSURE: 76 MMHG | RESPIRATION RATE: 16 BRPM

## 2019-04-03 DIAGNOSIS — D50.8 OTHER IRON DEFICIENCY ANEMIA: Primary | ICD-10-CM

## 2019-04-03 PROCEDURE — 25800030 ZZH RX IP 258 OP 636: Performed by: NURSE PRACTITIONER

## 2019-04-03 PROCEDURE — 96365 THER/PROPH/DIAG IV INF INIT: CPT

## 2019-04-03 PROCEDURE — 25000128 H RX IP 250 OP 636: Performed by: NURSE PRACTITIONER

## 2019-04-03 RX ORDER — DIPHENHYDRAMINE HCL 25 MG
25 CAPSULE ORAL
Status: CANCELLED | OUTPATIENT
Start: 2019-04-03

## 2019-04-03 RX ORDER — ACETAMINOPHEN 325 MG/1
650 TABLET ORAL
Status: CANCELLED
Start: 2019-04-03

## 2019-04-03 RX ADMIN — IRON SUCROSE 200 MG: 20 INJECTION, SOLUTION INTRAVENOUS at 14:38

## 2019-04-03 RX ADMIN — SODIUM CHLORIDE 250 ML: 9 INJECTION, SOLUTION INTRAVENOUS at 14:38

## 2019-04-03 NOTE — PROGRESS NOTES
Infusion Nursing Note:  Vikki Carrizales presents today for venofer 1/5.    Patient seen by provider today: No   present during visit today: Not Applicable.    Note: N/A.    Intravenous Access:  Peripheral IV placed.    Treatment Conditions:  Not Applicable.      Post Infusion Assessment:  Patient tolerated infusion without incident.  Patient observed for 30 minutes post iron infusion per protocol.  Blood return noted pre and post infusion.  Site patent and intact, free from redness, edema or discomfort.  No evidence of extravasations.  Access discontinued per protocol.       Discharge Plan:   Patient discharged in stable condition accompanied by: self.  Departure Mode: Ambulatory, will return on 4/5/19 for next infusion.    Padmini Mai RN

## 2019-04-05 ENCOUNTER — HOSPITAL ENCOUNTER (OUTPATIENT)
Dept: INFUSION THERAPY | Facility: OTHER | Age: 54
Discharge: HOME | End: 2019-04-05
Attending: NURSE PRACTITIONER | Admitting: NURSE PRACTITIONER
Payer: COMMERCIAL

## 2019-04-05 VITALS
RESPIRATION RATE: 16 BRPM | SYSTOLIC BLOOD PRESSURE: 103 MMHG | HEART RATE: 84 BPM | TEMPERATURE: 97.5 F | DIASTOLIC BLOOD PRESSURE: 59 MMHG

## 2019-04-05 DIAGNOSIS — D50.8 OTHER IRON DEFICIENCY ANEMIA: Primary | ICD-10-CM

## 2019-04-05 PROCEDURE — 96365 THER/PROPH/DIAG IV INF INIT: CPT

## 2019-04-05 PROCEDURE — 25000128 H RX IP 250 OP 636: Performed by: NURSE PRACTITIONER

## 2019-04-05 PROCEDURE — 25800030 ZZH RX IP 258 OP 636: Performed by: NURSE PRACTITIONER

## 2019-04-05 RX ORDER — ACETAMINOPHEN 500 MG
500-1000 TABLET ORAL EVERY 6 HOURS PRN
COMMUNITY

## 2019-04-05 RX ORDER — DIPHENHYDRAMINE HCL 25 MG
25 CAPSULE ORAL
Status: CANCELLED | OUTPATIENT
Start: 2019-04-05

## 2019-04-05 RX ORDER — ACETAMINOPHEN 325 MG/1
650 TABLET ORAL
Status: CANCELLED
Start: 2019-04-05

## 2019-04-05 RX ADMIN — IRON SUCROSE 200 MG: 20 INJECTION, SOLUTION INTRAVENOUS at 14:54

## 2019-04-05 NOTE — PROGRESS NOTES
Infusion Nursing Note:  Vikki Carrizales presents today for Venofer 2 of 5.    Patient seen by provider today: No   present during visit today: Not Applicable.    Note: N/A.    Intravenous Access:  Peripheral IV placed.    Treatment Conditions:  Not Applicable.    Post Infusion Assessment:  Patient tolerated infusion without incident.  Blood return noted pre and post infusion.  Site patent and intact, free from redness, edema or discomfort.  No evidence of extravasations.  Access discontinued per protocol.     Discharge Plan:   Patient discharged in stable condition accompanied by: self.  Departure Mode: Ambulatory.  Next infusion 4/8/19.    Brenda J. Goodell, RN

## 2019-04-08 ENCOUNTER — HOSPITAL ENCOUNTER (OUTPATIENT)
Dept: INFUSION THERAPY | Facility: OTHER | Age: 54
Discharge: HOME | End: 2019-04-08
Attending: NURSE PRACTITIONER | Admitting: NURSE PRACTITIONER
Payer: COMMERCIAL

## 2019-04-08 VITALS
DIASTOLIC BLOOD PRESSURE: 76 MMHG | HEART RATE: 81 BPM | TEMPERATURE: 97.9 F | SYSTOLIC BLOOD PRESSURE: 126 MMHG | RESPIRATION RATE: 16 BRPM

## 2019-04-08 DIAGNOSIS — D50.8 OTHER IRON DEFICIENCY ANEMIA: Primary | ICD-10-CM

## 2019-04-08 PROCEDURE — 96365 THER/PROPH/DIAG IV INF INIT: CPT

## 2019-04-08 PROCEDURE — 25800030 ZZH RX IP 258 OP 636: Performed by: NURSE PRACTITIONER

## 2019-04-08 PROCEDURE — 25000128 H RX IP 250 OP 636: Performed by: NURSE PRACTITIONER

## 2019-04-08 RX ORDER — DIPHENHYDRAMINE HCL 25 MG
25 CAPSULE ORAL
Status: CANCELLED | OUTPATIENT
Start: 2019-04-08

## 2019-04-08 RX ORDER — ACETAMINOPHEN 325 MG/1
650 TABLET ORAL
Status: CANCELLED
Start: 2019-04-08

## 2019-04-08 RX ADMIN — IRON SUCROSE 200 MG: 20 INJECTION, SOLUTION INTRAVENOUS at 14:47

## 2019-04-08 NOTE — PROGRESS NOTES
Infusion Nursing Note:  Vikki Carrizales presents today for Venofer 3/5.    Patient seen by provider today: No   present during visit today: Not Applicable.    Note: N/A.    Intravenous Access:  Peripheral IV placed to left antecubital space.    Treatment Conditions:  Not Applicable.      Post Infusion Assessment:  Patient tolerated infusion without incident.  Site patent and intact, free from redness, edema or discomfort.  No evidence of extravasations.  Access discontinued per protocol.       Discharge Plan:   Patient discharged in stable condition accompanied by: self and will return Wednesday .    Melvi Sagastume RN

## 2019-04-10 ENCOUNTER — HOSPITAL ENCOUNTER (OUTPATIENT)
Dept: INFUSION THERAPY | Facility: OTHER | Age: 54
Discharge: HOME | End: 2019-04-10
Attending: NURSE PRACTITIONER | Admitting: NURSE PRACTITIONER
Payer: COMMERCIAL

## 2019-04-10 VITALS
TEMPERATURE: 98.1 F | SYSTOLIC BLOOD PRESSURE: 124 MMHG | RESPIRATION RATE: 16 BRPM | HEART RATE: 72 BPM | DIASTOLIC BLOOD PRESSURE: 80 MMHG

## 2019-04-10 DIAGNOSIS — D50.8 OTHER IRON DEFICIENCY ANEMIA: Primary | ICD-10-CM

## 2019-04-10 PROCEDURE — 25800030 ZZH RX IP 258 OP 636: Performed by: NURSE PRACTITIONER

## 2019-04-10 PROCEDURE — 25000128 H RX IP 250 OP 636: Performed by: NURSE PRACTITIONER

## 2019-04-10 PROCEDURE — 96365 THER/PROPH/DIAG IV INF INIT: CPT

## 2019-04-10 RX ORDER — ACETAMINOPHEN 325 MG/1
650 TABLET ORAL
Status: CANCELLED
Start: 2019-04-10

## 2019-04-10 RX ORDER — DIPHENHYDRAMINE HCL 25 MG
25 CAPSULE ORAL
Status: CANCELLED | OUTPATIENT
Start: 2019-04-10

## 2019-04-10 RX ADMIN — IRON SUCROSE 200 MG: 20 INJECTION, SOLUTION INTRAVENOUS at 14:43

## 2019-04-10 NOTE — PROGRESS NOTES
Infusion Nursing Note:  Vikki Carrizales presents today for Venofer infusion 4 of 5.    Patient seen by provider today: No   present during visit today: Not Applicable.    Note: N/A.    Intravenous Access:  Peripheral IV placed.    Treatment Conditions:  Not Applicable.    Post Infusion Assessment:  Patient tolerated infusion without incident.  Blood return noted pre and post infusion.  Site patent and intact, free from redness, edema or discomfort.  No evidence of extravasations.  Access discontinued per protocol.     Discharge Plan:   Patient discharged in stable condition accompanied by: self.  Departure Mode: Ambulatory.  Next infusion 4/12/19.    Brenda J. Goodell, RN

## 2019-04-11 NOTE — ADDENDUM NOTE
Encounter addended by: Melvi Sagastume RN on: 4/11/2019 9:16 AM   Actions taken: MAR administration accepted

## 2019-04-12 ENCOUNTER — HOSPITAL ENCOUNTER (OUTPATIENT)
Dept: INFUSION THERAPY | Facility: OTHER | Age: 54
Discharge: HOME | End: 2019-04-12
Attending: NURSE PRACTITIONER | Admitting: NURSE PRACTITIONER
Payer: COMMERCIAL

## 2019-04-12 VITALS — SYSTOLIC BLOOD PRESSURE: 138 MMHG | RESPIRATION RATE: 16 BRPM | DIASTOLIC BLOOD PRESSURE: 79 MMHG | TEMPERATURE: 97.9 F

## 2019-04-12 DIAGNOSIS — D50.8 OTHER IRON DEFICIENCY ANEMIA: Primary | ICD-10-CM

## 2019-04-12 PROCEDURE — 25000128 H RX IP 250 OP 636: Performed by: NURSE PRACTITIONER

## 2019-04-12 PROCEDURE — 96365 THER/PROPH/DIAG IV INF INIT: CPT

## 2019-04-12 PROCEDURE — 25800030 ZZH RX IP 258 OP 636: Performed by: NURSE PRACTITIONER

## 2019-04-12 RX ORDER — ACETAMINOPHEN 325 MG/1
650 TABLET ORAL
Status: CANCELLED
Start: 2019-04-12

## 2019-04-12 RX ORDER — DIPHENHYDRAMINE HCL 25 MG
25 CAPSULE ORAL
Status: CANCELLED | OUTPATIENT
Start: 2019-04-12

## 2019-04-12 RX ADMIN — IRON SUCROSE 200 MG: 20 INJECTION, SOLUTION INTRAVENOUS at 14:45

## 2019-04-12 NOTE — PROGRESS NOTES
Infusion Nursing Note:  Vikki Carrizales presents today for venofer.    Patient seen by provider today: No   present during visit today: Not Applicable.    Note: N/A.    Intravenous Access:  Peripheral IV placed.    Treatment Conditions:  Not Applicable.      Post Infusion Assessment:  Patient tolerated infusion without incident.       Discharge Plan:   Discharge instructions reviewed with: Patient.  Patient discharged in stable condition accompanied by: self.  Departure Mode: Ambulatory.    Ana Maria Bravo RN

## 2019-04-15 ENCOUNTER — MYC MEDICAL ADVICE (OUTPATIENT)
Dept: FAMILY MEDICINE | Facility: OTHER | Age: 54
End: 2019-04-15

## 2019-04-15 DIAGNOSIS — D50.9 IRON DEFICIENCY ANEMIA, UNSPECIFIED IRON DEFICIENCY ANEMIA TYPE: Primary | ICD-10-CM

## 2019-04-19 NOTE — TELEPHONE ENCOUNTER
Please call Vikki and let her know that I have put in lab orders for her. She should make a lab only appt for about 6-8 weeks after infusion. And please apologize for the delay in response as I have been out with a family emergency. Thanks!

## 2019-05-02 ENCOUNTER — MYC MEDICAL ADVICE (OUTPATIENT)
Dept: FAMILY MEDICINE | Facility: OTHER | Age: 54
End: 2019-05-02

## 2019-05-02 DIAGNOSIS — F33.41 RECURRENT MAJOR DEPRESSIVE DISORDER, IN PARTIAL REMISSION (H): Primary | ICD-10-CM

## 2019-05-06 ENCOUNTER — TELEPHONE (OUTPATIENT)
Dept: FAMILY MEDICINE | Facility: OTHER | Age: 54
End: 2019-05-06

## 2019-05-06 RX ORDER — SERTRALINE HYDROCHLORIDE 25 MG/1
25 TABLET, FILM COATED ORAL DAILY
Qty: 30 TABLET | Refills: 3 | Status: SHIPPED | OUTPATIENT
Start: 2019-05-06 | End: 2019-08-28 | Stop reason: ALTCHOICE

## 2019-05-06 NOTE — TELEPHONE ENCOUNTER
Please call Vikki and complete the VALERIE-7 and PHQ-9 assessments with her. I have no trouble with a trial of decrease in Zoloft for improvement in libido, it appears as though she has been on 50mg since 2017. I do want to follow up with her in about 4 weeks with a VALERIE-7 and PHQ-9. If she is noting increase in depression symptoms, we can certainly increase her wellbutrin at that time. I have sent in a new prescription for 25mg zoloft. The tablets she has at home she may cut in half until gone, then  the new script strength and take 1 whole tablet.

## 2019-05-21 NOTE — TELEPHONE ENCOUNTER
Left message to call back....................  5/21/2019   3:15 PM  Erin Tolliver LPN...................5/21/2019  3:15 PM

## 2019-05-24 DIAGNOSIS — D50.9 IRON DEFICIENCY ANEMIA, UNSPECIFIED IRON DEFICIENCY ANEMIA TYPE: ICD-10-CM

## 2019-05-24 LAB
ERYTHROCYTE [DISTWIDTH] IN BLOOD BY AUTOMATED COUNT: 25.5 % (ref 10–15)
FERRITIN SERPL-MCNC: 6 NG/ML (ref 23.9–336.2)
HCT VFR BLD AUTO: 39.3 % (ref 35–47)
HGB BLD-MCNC: 12 G/DL (ref 11.7–15.7)
IRON SATN MFR SERPL: 8 % (ref 20–55)
IRON SERPL-MCNC: 35 UG/DL (ref 50–212)
MCH RBC QN AUTO: 25.9 PG (ref 26.5–33)
MCHC RBC AUTO-ENTMCNC: 30.5 G/DL (ref 31.5–36.5)
MCV RBC AUTO: 85 FL (ref 78–100)
PLATELET # BLD AUTO: 233 10E9/L (ref 150–450)
RBC # BLD AUTO: 4.63 10E12/L (ref 3.8–5.2)
TIBC SERPL-MCNC: 446.6 UG/DL (ref 245–400)
TRANSFERRIN SERPL-MCNC: 297 MG/DL (ref 203–362)
UIBC (UNSATURATED): 411.6 MG/DL
WBC # BLD AUTO: 4.9 10E9/L (ref 4–11)

## 2019-05-24 PROCEDURE — 82728 ASSAY OF FERRITIN: CPT | Performed by: NURSE PRACTITIONER

## 2019-05-24 PROCEDURE — 84466 ASSAY OF TRANSFERRIN: CPT | Performed by: NURSE PRACTITIONER

## 2019-05-24 PROCEDURE — 85027 COMPLETE CBC AUTOMATED: CPT | Performed by: NURSE PRACTITIONER

## 2019-05-24 PROCEDURE — 83550 IRON BINDING TEST: CPT | Performed by: NURSE PRACTITIONER

## 2019-05-24 PROCEDURE — 83540 ASSAY OF IRON: CPT | Performed by: NURSE PRACTITIONER

## 2019-05-24 PROCEDURE — 36415 COLL VENOUS BLD VENIPUNCTURE: CPT | Performed by: NURSE PRACTITIONER

## 2019-05-31 ASSESSMENT — PATIENT HEALTH QUESTIONNAIRE - PHQ9
5. POOR APPETITE OR OVEREATING: SEVERAL DAYS
SUM OF ALL RESPONSES TO PHQ QUESTIONS 1-9: 2

## 2019-05-31 ASSESSMENT — ANXIETY QUESTIONNAIRES
6. BECOMING EASILY ANNOYED OR IRRITABLE: NOT AT ALL
5. BEING SO RESTLESS THAT IT IS HARD TO SIT STILL: NOT AT ALL
IF YOU CHECKED OFF ANY PROBLEMS ON THIS QUESTIONNAIRE, HOW DIFFICULT HAVE THESE PROBLEMS MADE IT FOR YOU TO DO YOUR WORK, TAKE CARE OF THINGS AT HOME, OR GET ALONG WITH OTHER PEOPLE: SOMEWHAT DIFFICULT
7. FEELING AFRAID AS IF SOMETHING AWFUL MIGHT HAPPEN: NOT AT ALL
GAD7 TOTAL SCORE: 5
2. NOT BEING ABLE TO STOP OR CONTROL WORRYING: MORE THAN HALF THE DAYS
1. FEELING NERVOUS, ANXIOUS, OR ON EDGE: NOT AT ALL
3. WORRYING TOO MUCH ABOUT DIFFERENT THINGS: MORE THAN HALF THE DAYS

## 2019-05-31 NOTE — TELEPHONE ENCOUNTER
After verifying pts name and date of birth with pt, pt was notified of Trina Durbin's message below and assessments were done at this time. Pt is requesting an for iron infusion be placed so she can schedule these infusions.  Janna Chaparro

## 2019-06-01 ASSESSMENT — ANXIETY QUESTIONNAIRES: GAD7 TOTAL SCORE: 5

## 2019-06-11 ENCOUNTER — TELEPHONE (OUTPATIENT)
Dept: FAMILY MEDICINE | Facility: OTHER | Age: 54
End: 2019-06-11

## 2019-06-11 NOTE — TELEPHONE ENCOUNTER
Vikki would like a call back regarding the need for further Venofer infusions.  There is no new order placed as of yet.

## 2019-06-19 ENCOUNTER — HOSPITAL ENCOUNTER (OUTPATIENT)
Dept: INFUSION THERAPY | Facility: OTHER | Age: 54
Discharge: HOME | End: 2019-06-19
Attending: NURSE PRACTITIONER | Admitting: NURSE PRACTITIONER
Payer: MEDICAID

## 2019-06-19 VITALS
DIASTOLIC BLOOD PRESSURE: 91 MMHG | RESPIRATION RATE: 16 BRPM | TEMPERATURE: 97.5 F | HEART RATE: 63 BPM | SYSTOLIC BLOOD PRESSURE: 146 MMHG

## 2019-06-19 DIAGNOSIS — D50.8 OTHER IRON DEFICIENCY ANEMIA: Primary | ICD-10-CM

## 2019-06-19 PROCEDURE — 25800030 ZZH RX IP 258 OP 636: Performed by: NURSE PRACTITIONER

## 2019-06-19 PROCEDURE — 96365 THER/PROPH/DIAG IV INF INIT: CPT

## 2019-06-19 PROCEDURE — 25000128 H RX IP 250 OP 636: Performed by: NURSE PRACTITIONER

## 2019-06-19 RX ADMIN — IRON SUCROSE 200 MG: 20 INJECTION, SOLUTION INTRAVENOUS at 08:17

## 2019-06-19 NOTE — PROGRESS NOTES
Infusion Nursing Note:  Vikki Carrizales presents today for venofer 1/5 cycle 2.    Patient seen by provider today: No   present during visit today: Not Applicable.    Note: N/A.    Intravenous Access:  Peripheral IV placed.    Treatment Conditions:  Not Applicable.      Post Infusion Assessment:  Patient tolerated infusion without incident.  Blood return noted pre and post infusion.  Site patent and intact, free from redness, edema or discomfort.  No evidence of extravasations.  Access discontinued per protocol.       Discharge Plan:   Patient discharged in stable condition accompanied by: self.  Departure Mode: Ambulatory.  Patient will return 6/21/19 for next infusion   Padmini Mai RN

## 2019-06-21 ENCOUNTER — HOSPITAL ENCOUNTER (OUTPATIENT)
Dept: INFUSION THERAPY | Facility: OTHER | Age: 54
Discharge: HOME | End: 2019-06-21
Attending: NURSE PRACTITIONER | Admitting: NURSE PRACTITIONER
Payer: MEDICAID

## 2019-06-21 VITALS
RESPIRATION RATE: 18 BRPM | TEMPERATURE: 96.8 F | SYSTOLIC BLOOD PRESSURE: 134 MMHG | DIASTOLIC BLOOD PRESSURE: 84 MMHG | HEART RATE: 73 BPM

## 2019-06-21 DIAGNOSIS — D50.8 OTHER IRON DEFICIENCY ANEMIA: Primary | ICD-10-CM

## 2019-06-21 PROCEDURE — 25000128 H RX IP 250 OP 636: Performed by: NURSE PRACTITIONER

## 2019-06-21 PROCEDURE — 96365 THER/PROPH/DIAG IV INF INIT: CPT

## 2019-06-21 PROCEDURE — 25800030 ZZH RX IP 258 OP 636: Performed by: NURSE PRACTITIONER

## 2019-06-21 RX ADMIN — IRON SUCROSE 200 MG: 20 INJECTION, SOLUTION INTRAVENOUS at 08:34

## 2019-06-21 NOTE — PROGRESS NOTES
Infusion Nursing Note:  Vikki Carrizales presents today for day 2 of Venofer of this order.    Patient seen by provider today: No   present during visit today: Not Applicable.    Note: N/A.    Intravenous Access:  Peripheral IV placed to left antecubital space    Treatment Conditions:  Not Applicable.      Post Infusion Assessment:  Patient tolerated infusion without incident.  Blood return noted pre infusion.  Site patent and intact, free from redness, edema or discomfort.  No evidence of extravasations.  Access discontinued per protocol.       Discharge Plan:   Declined Copy of AVS.  Patient will return  Next week for appointment, verbalized she has schedule on her phone.  Patient discharged in stable condition accompanied by: self.  Departure Mode: Ambulatory.    Melvi Sagastume RN

## 2019-06-24 ENCOUNTER — HOSPITAL ENCOUNTER (OUTPATIENT)
Dept: INFUSION THERAPY | Facility: OTHER | Age: 54
Discharge: HOME | End: 2019-06-24
Attending: NURSE PRACTITIONER | Admitting: NURSE PRACTITIONER
Payer: MEDICAID

## 2019-06-24 VITALS — HEART RATE: 73 BPM | DIASTOLIC BLOOD PRESSURE: 80 MMHG | SYSTOLIC BLOOD PRESSURE: 146 MMHG | TEMPERATURE: 97.9 F

## 2019-06-24 DIAGNOSIS — D50.8 OTHER IRON DEFICIENCY ANEMIA: Primary | ICD-10-CM

## 2019-06-24 PROCEDURE — 25800030 ZZH RX IP 258 OP 636: Performed by: NURSE PRACTITIONER

## 2019-06-24 PROCEDURE — 96365 THER/PROPH/DIAG IV INF INIT: CPT

## 2019-06-24 PROCEDURE — 25000128 H RX IP 250 OP 636: Performed by: NURSE PRACTITIONER

## 2019-06-24 RX ADMIN — IRON SUCROSE 200 MG: 20 INJECTION, SOLUTION INTRAVENOUS at 08:49

## 2019-06-24 NOTE — PROGRESS NOTES
Infusion Nursing Note:  Vikki Carrizales presents today for Venofer.    Patient seen by provider today: No   present during visit today: Not Applicable.    Note: N/A.    Intravenous Access:  Peripheral IV placed.    Treatment Conditions:  Not Applicable.      Post Infusion Assessment:  Patient tolerated infusion without incident.  Site patent and intact, free from redness, edema or discomfort.  No evidence of extravasations.  Access discontinued per protocol.       Discharge Plan:   Patient declined prescription refills.  Declined Copy of AVS. Patient will return Wednesday for next appointment.  Patient discharged in stable condition accompanied by: self.  Departure Mode: Ambulatory.    Jean S. Hammann, RN

## 2019-06-26 ENCOUNTER — HOSPITAL ENCOUNTER (OUTPATIENT)
Dept: INFUSION THERAPY | Facility: OTHER | Age: 54
Discharge: HOME | End: 2019-06-26
Attending: NURSE PRACTITIONER | Admitting: NURSE PRACTITIONER
Payer: MEDICAID

## 2019-06-26 VITALS
DIASTOLIC BLOOD PRESSURE: 82 MMHG | RESPIRATION RATE: 18 BRPM | TEMPERATURE: 96.9 F | SYSTOLIC BLOOD PRESSURE: 131 MMHG | HEART RATE: 76 BPM

## 2019-06-26 DIAGNOSIS — D50.8 OTHER IRON DEFICIENCY ANEMIA: Primary | ICD-10-CM

## 2019-06-26 PROCEDURE — 96365 THER/PROPH/DIAG IV INF INIT: CPT

## 2019-06-26 PROCEDURE — 25000128 H RX IP 250 OP 636: Performed by: NURSE PRACTITIONER

## 2019-06-26 PROCEDURE — 25800030 ZZH RX IP 258 OP 636: Performed by: NURSE PRACTITIONER

## 2019-06-26 RX ADMIN — IRON SUCROSE 200 MG: 20 INJECTION, SOLUTION INTRAVENOUS at 08:12

## 2019-06-26 NOTE — PROGRESS NOTES
Infusion Nursing Note:  Vikki Carrizales presents today for Venofer 4.    Patient seen by provider today: No   present during visit today: Not Applicable.    Note: N/A.    Intravenous Access:  Peripheral IV placed right antecubital space with brisk blood return..    Treatment Conditions:  Not Applicable.      Post Infusion Assessment:  Patient tolerated infusion without incident.  Blood return noted pre and post infusion.  Site patent and intact, free from redness, edema or discomfort.  No evidence of extravasations.  Access discontinued per protocol.       Discharge Plan:   Declined Copy of AVS.  Patient will return Friday for next appointment.  Patient discharged in stable condition accompanied by: self.  Departure Mode: Ambulatory.    Melvi Sagastume RN

## 2019-06-28 ENCOUNTER — HOSPITAL ENCOUNTER (OUTPATIENT)
Dept: INFUSION THERAPY | Facility: OTHER | Age: 54
Discharge: HOME | End: 2019-06-28
Attending: NURSE PRACTITIONER | Admitting: NURSE PRACTITIONER
Payer: MEDICAID

## 2019-06-28 VITALS
SYSTOLIC BLOOD PRESSURE: 133 MMHG | RESPIRATION RATE: 16 BRPM | HEART RATE: 72 BPM | DIASTOLIC BLOOD PRESSURE: 91 MMHG | TEMPERATURE: 96.8 F

## 2019-06-28 DIAGNOSIS — D50.8 OTHER IRON DEFICIENCY ANEMIA: Primary | ICD-10-CM

## 2019-06-28 PROCEDURE — 25000128 H RX IP 250 OP 636: Performed by: NURSE PRACTITIONER

## 2019-06-28 PROCEDURE — 96365 THER/PROPH/DIAG IV INF INIT: CPT

## 2019-06-28 PROCEDURE — 25800030 ZZH RX IP 258 OP 636: Performed by: NURSE PRACTITIONER

## 2019-06-28 RX ADMIN — IRON SUCROSE 200 MG: 20 INJECTION, SOLUTION INTRAVENOUS at 08:20

## 2019-06-28 NOTE — PROGRESS NOTES
Infusion Nursing Note:  Vikki Carrizales presents today for venofer.    Patient seen by provider today: No   present during visit today: Not Applicable.    Note: N/A.    Intravenous Access:  Peripheral IV placed.    Treatment Conditions:  Not Applicable.      Post Infusion Assessment:  Patient tolerated infusion without incident.  Site patent and intact, free from redness, edema or discomfort.  No evidence of extravasations.  Access discontinued per protocol.       Discharge Plan:   Patient and/or family verbalized understanding of discharge instructions and all questions answered.  Patient discharged in stable condition accompanied by: self.  Departure Mode: Ambulatory.    Ana Maria Bravo RN

## 2019-08-13 DIAGNOSIS — D50.9 IRON DEFICIENCY ANEMIA, UNSPECIFIED IRON DEFICIENCY ANEMIA TYPE: ICD-10-CM

## 2019-08-13 DIAGNOSIS — F41.1 GAD (GENERALIZED ANXIETY DISORDER): ICD-10-CM

## 2019-08-13 LAB
ERYTHROCYTE [DISTWIDTH] IN BLOOD BY AUTOMATED COUNT: 15.9 % (ref 10–15)
FERRITIN SERPL-MCNC: 53 NG/ML (ref 23.9–336.2)
HCT VFR BLD AUTO: 44.1 % (ref 35–47)
HGB BLD-MCNC: 14.4 G/DL (ref 11.7–15.7)
IRON SATN MFR SERPL: 29 % (ref 20–55)
IRON SERPL-MCNC: 99 UG/DL (ref 50–212)
MCH RBC QN AUTO: 30.6 PG (ref 26.5–33)
MCHC RBC AUTO-ENTMCNC: 32.7 G/DL (ref 31.5–36.5)
MCV RBC AUTO: 94 FL (ref 78–100)
PLATELET # BLD AUTO: 242 10E9/L (ref 150–450)
RBC # BLD AUTO: 4.71 10E12/L (ref 3.8–5.2)
TIBC SERPL-MCNC: 344.4 UG/DL (ref 245–400)
UIBC (UNSATURATED): 245.4 MG/DL
WBC # BLD AUTO: 4.6 10E9/L (ref 4–11)

## 2019-08-13 PROCEDURE — 82728 ASSAY OF FERRITIN: CPT | Performed by: NURSE PRACTITIONER

## 2019-08-13 PROCEDURE — 85027 COMPLETE CBC AUTOMATED: CPT | Performed by: NURSE PRACTITIONER

## 2019-08-13 PROCEDURE — 36415 COLL VENOUS BLD VENIPUNCTURE: CPT | Performed by: NURSE PRACTITIONER

## 2019-08-13 PROCEDURE — 83550 IRON BINDING TEST: CPT | Performed by: NURSE PRACTITIONER

## 2019-08-13 PROCEDURE — 83540 ASSAY OF IRON: CPT | Performed by: NURSE PRACTITIONER

## 2019-08-15 ENCOUNTER — MYC MEDICAL ADVICE (OUTPATIENT)
Dept: FAMILY MEDICINE | Facility: OTHER | Age: 54
End: 2019-08-15

## 2019-08-15 ASSESSMENT — ANXIETY QUESTIONNAIRES
7. FEELING AFRAID AS IF SOMETHING AWFUL MIGHT HAPPEN: NOT AT ALL
3. WORRYING TOO MUCH ABOUT DIFFERENT THINGS: NOT AT ALL
IF YOU CHECKED OFF ANY PROBLEMS ON THIS QUESTIONNAIRE, HOW DIFFICULT HAVE THESE PROBLEMS MADE IT FOR YOU TO DO YOUR WORK, TAKE CARE OF THINGS AT HOME, OR GET ALONG WITH OTHER PEOPLE: NOT DIFFICULT AT ALL
5. BEING SO RESTLESS THAT IT IS HARD TO SIT STILL: NOT AT ALL
6. BECOMING EASILY ANNOYED OR IRRITABLE: NOT AT ALL
2. NOT BEING ABLE TO STOP OR CONTROL WORRYING: NEARLY EVERY DAY
GAD7 TOTAL SCORE: 4
1. FEELING NERVOUS, ANXIOUS, OR ON EDGE: SEVERAL DAYS

## 2019-08-15 ASSESSMENT — PATIENT HEALTH QUESTIONNAIRE - PHQ9
5. POOR APPETITE OR OVEREATING: NOT AT ALL
SUM OF ALL RESPONSES TO PHQ QUESTIONS 1-9: 12

## 2019-08-16 ASSESSMENT — ANXIETY QUESTIONNAIRES: GAD7 TOTAL SCORE: 4

## 2019-08-28 RX ORDER — BUPROPION HYDROCHLORIDE 150 MG/1
300 TABLET ORAL EVERY MORNING
Qty: 90 TABLET | Refills: 3 | Status: SHIPPED | OUTPATIENT
Start: 2019-08-28 | End: 2020-03-02

## 2019-09-06 RX ORDER — SERTRALINE HYDROCHLORIDE 25 MG/1
25 TABLET, FILM COATED ORAL DAILY
COMMUNITY
End: 2019-10-23

## 2019-09-10 RX ORDER — SERTRALINE HYDROCHLORIDE 25 MG/1
25 TABLET, FILM COATED ORAL DAILY
Qty: 90 TABLET | Refills: 11 | OUTPATIENT
Start: 2019-09-10

## 2019-09-11 ENCOUNTER — MYC MEDICAL ADVICE (OUTPATIENT)
Dept: FAMILY MEDICINE | Facility: OTHER | Age: 54
End: 2019-09-11

## 2019-09-11 NOTE — TELEPHONE ENCOUNTER
Attempted to reach patient, got patient's voicemail with first and last name. Left detailed message of information below.  John Yusuf LPN, JAMES ..............9/11/2019 11:09 AM

## 2019-09-11 NOTE — TELEPHONE ENCOUNTER
Patient got a puncture wound from a dog bite this morning.  She states that the dog has not had all of its rabies shots yet as it is just under two years old.  She states that she cleaned the wound with alcohol and applied a bandage.  Patient states that area around wound is not red, sore or swollen.  Patient is wanting to know if she needs to come in for this.   Elidia Smalls LPN........................9/11/2019  10:12 AM

## 2019-09-11 NOTE — TELEPHONE ENCOUNTER
Should be fine. Her tetanus is up to date and it doesn't sound as though this is a significant wound by any means. If the dog is showing signs of rabies, then she should get a rabies shot. Unprovoked bites can indicate rabies.

## 2019-10-16 ENCOUNTER — MYC MEDICAL ADVICE (OUTPATIENT)
Dept: FAMILY MEDICINE | Facility: OTHER | Age: 54
End: 2019-10-16

## 2019-10-17 ENCOUNTER — MYC MEDICAL ADVICE (OUTPATIENT)
Dept: FAMILY MEDICINE | Facility: OTHER | Age: 54
End: 2019-10-17

## 2019-10-23 ENCOUNTER — OFFICE VISIT (OUTPATIENT)
Dept: FAMILY MEDICINE | Facility: OTHER | Age: 54
End: 2019-10-23
Attending: FAMILY MEDICINE
Payer: COMMERCIAL

## 2019-10-23 ENCOUNTER — TELEPHONE (OUTPATIENT)
Dept: SURGERY | Facility: OTHER | Age: 54
End: 2019-10-23

## 2019-10-23 VITALS
DIASTOLIC BLOOD PRESSURE: 84 MMHG | TEMPERATURE: 97.6 F | BODY MASS INDEX: 31.69 KG/M2 | OXYGEN SATURATION: 98 % | WEIGHT: 185.6 LBS | HEART RATE: 78 BPM | RESPIRATION RATE: 16 BRPM | SYSTOLIC BLOOD PRESSURE: 122 MMHG | HEIGHT: 64 IN

## 2019-10-23 DIAGNOSIS — E53.8 VITAMIN B12 DEFICIENCY: ICD-10-CM

## 2019-10-23 DIAGNOSIS — D50.8 OTHER IRON DEFICIENCY ANEMIA: Primary | ICD-10-CM

## 2019-10-23 DIAGNOSIS — K21.9 GASTROESOPHAGEAL REFLUX DISEASE WITHOUT ESOPHAGITIS: ICD-10-CM

## 2019-10-23 PROBLEM — L02.91 ABSCESS: Status: RESOLVED | Noted: 2018-01-16 | Resolved: 2019-10-23

## 2019-10-23 LAB
ERYTHROCYTE [DISTWIDTH] IN BLOOD BY AUTOMATED COUNT: 12.9 % (ref 10–15)
FERRITIN SERPL-MCNC: 49 NG/ML (ref 23.9–336.2)
HCT VFR BLD AUTO: 44.2 % (ref 35–47)
HGB BLD-MCNC: 14.6 G/DL (ref 11.7–15.7)
IRON SERPL-MCNC: 89 UG/DL (ref 50–212)
MCH RBC QN AUTO: 32.2 PG (ref 26.5–33)
MCHC RBC AUTO-ENTMCNC: 33 G/DL (ref 31.5–36.5)
MCV RBC AUTO: 97 FL (ref 78–100)
PLATELET # BLD AUTO: 240 10E9/L (ref 150–450)
RBC # BLD AUTO: 4.54 10E12/L (ref 3.8–5.2)
VIT B12 SERPL-MCNC: 247 PG/ML (ref 180–914)
WBC # BLD AUTO: 6.1 10E9/L (ref 4–11)

## 2019-10-23 PROCEDURE — 99214 OFFICE O/P EST MOD 30 MIN: CPT | Performed by: FAMILY MEDICINE

## 2019-10-23 PROCEDURE — 83540 ASSAY OF IRON: CPT | Mod: ZL | Performed by: FAMILY MEDICINE

## 2019-10-23 PROCEDURE — 82728 ASSAY OF FERRITIN: CPT | Mod: ZL | Performed by: FAMILY MEDICINE

## 2019-10-23 PROCEDURE — 85027 COMPLETE CBC AUTOMATED: CPT | Mod: ZL | Performed by: FAMILY MEDICINE

## 2019-10-23 PROCEDURE — 36415 COLL VENOUS BLD VENIPUNCTURE: CPT | Mod: ZL | Performed by: FAMILY MEDICINE

## 2019-10-23 PROCEDURE — G0463 HOSPITAL OUTPT CLINIC VISIT: HCPCS | Performed by: FAMILY MEDICINE

## 2019-10-23 PROCEDURE — 82607 VITAMIN B-12: CPT | Mod: ZL | Performed by: FAMILY MEDICINE

## 2019-10-23 ASSESSMENT — PAIN SCALES - GENERAL: PAINLEVEL: NO PAIN (0)

## 2019-10-23 ASSESSMENT — MIFFLIN-ST. JEOR: SCORE: 1422.91

## 2019-10-23 ASSESSMENT — PATIENT HEALTH QUESTIONNAIRE - PHQ9: SUM OF ALL RESPONSES TO PHQ QUESTIONS 1-9: 0

## 2019-10-23 NOTE — H&P (VIEW-ONLY)
Labs  SUBJECTIVE:   Vikki Carrizales is a 54 year old female who presents to clinic today for the following health issues: Labs    Patient arrives here for labs.  She has a history of iron deficiency anemia.  States that she has had it for years.  More recently she is been intolerant to oral iron and has converted to IV iron.  States that the restless legs get more active when she is low on her iron.  She feels them getting worse.  Patient is never had a scope either upper or lower.  She does not know why she is having iron deficiency anemia.  She is status post gastric bypass.  Patient reports that there is a history of stomach cancer in the family with an uncle.  No history of colon cancer        Patient Active Problem List    Diagnosis Date Noted     Allergic rhinitis 01/26/2018     Priority: Medium     Esophageal reflux 01/26/2018     Priority: Medium     Restless leg syndrome 01/26/2018     Priority: Medium     Menorrhagia with irregular cycle 04/11/2017     Priority: Medium     Non morbid obesity due to excess calories 04/11/2017     Priority: Medium     Overview:   Had gastric bypass surgery -        Vitamin B12 deficiency 04/19/2011     Priority: Medium     Genital herpes 04/12/2011     Priority: Medium     Thrombophlebitis leg 12/31/2010     Priority: Medium     Depression, major, recurrent (H) 08/25/2005     Priority: Medium     Venous insufficiency 03/09/2005     Priority: Medium     Anemia, iron deficiency 06/09/2001     Priority: Medium     Asthma 05/01/2000     Priority: Medium     Past Medical History:   Diagnosis Date     Anemia      Anxiety      Cutaneous abscess     1/16/2018     Gastro-esophageal reflux disease without esophagitis     2003     Herpesviral infection of urogenital system     No Comments Provided     Impetigo     recurrent - as a child     Major depressive disorder, single episode     1990,Rx: zoloft, citalopram and prozac in the past, none since 2009)     Other seasonal allergic  "rhinitis     lifelong     Personal history of other medical treatment (CODE)      1 child  in childbirth (); one living child (1996)     Uncomplicated asthma          Vitamin D deficiency     No Comments Provided      Past Surgical History:   Procedure Laterality Date      SECTION      ,,HELLP syndrome, Platelet transfusions; son       SECTION           GASTRIC BYPASS       LAPAROSCOPIC BYPASS GASTRIC      , [place]ore en Y Gastric Bypass, Dr Gabriel Torres, [facility]     Family History   Problem Relation Age of Onset     Other - See Comments Mother      Aortic aneurysm Father      Arrhythmia Father      Diabetes Maternal Grandmother         Diabetes     Other - See Comments Maternal Grandmother         Stroke     Other - See Comments Maternal Grandfather         Depression     Osteoporosis Maternal Grandfather         Osteoporosis     Diabetes Maternal Aunt         Diabetes     Other - See Comments Sister         Thrombophlebitis     Heart Disease Brother         Heart Disease     Breast Cancer No family hx of         Cancer-breast     Allergies   Allergen Reactions     Seasonal Allergies      seasonal       Review of Systems     OBJECTIVE:     /84   Pulse 78   Temp 97.6  F (36.4  C) (Temporal)   Resp 16   Ht 1.619 m (5' 3.75\")   Wt 84.2 kg (185 lb 9.6 oz)   SpO2 98%   BMI 32.11 kg/m    Body mass index is 32.11 kg/m .  Physical Exam  HENT:      Head: Normocephalic.   Cardiovascular:      Rate and Rhythm: Normal rate and regular rhythm.   Pulmonary:      Effort: Pulmonary effort is normal.      Breath sounds: Normal breath sounds.   Abdominal:      General: Abdomen is flat. There is no distension.      Palpations: There is no mass.      Tenderness: There is no tenderness. There is no guarding.   Neurological:      Mental Status: She is alert.         Diagnostic Test Results:  Results for orders placed or performed in visit on 10/23/19 (from the " past 24 hour(s))   CBC W PLT No Diff   Result Value Ref Range    WBC 6.1 4.0 - 11.0 10e9/L    RBC Count 4.54 3.8 - 5.2 10e12/L    Hemoglobin 14.6 11.7 - 15.7 g/dL    Hematocrit 44.2 35.0 - 47.0 %    MCV 97 78 - 100 fl    MCH 32.2 26.5 - 33.0 pg    MCHC 33.0 31.5 - 36.5 g/dL    RDW 12.9 10.0 - 15.0 %    Platelet Count 240 150 - 450 10e9/L       ASSESSMENT/PLAN:         1. Other iron deficiency anemia  CBC was normal rest the labs are pending patient would benefit from scopes upper and lower.  - CBC W PLT No Diff; Future  - Ferritin; Future  - Iron; Future  - GASTROENTEROLOGY ADULT REF PROCEDURE ONLY None  - Iron  - Ferritin  - CBC W PLT No Diff    2. Vitamin B12 deficiency  History of vitamin B12 deficiency.  Patient is not on B12 shots.  Will check  - Vitamin B12; Future  - Vitamin B12    3. Gastroesophageal reflux disease without esophagitis  Etiology of the iron deficiency anemia is unclear.        Malik Calderon MD  [facility]

## 2019-10-23 NOTE — NURSING NOTE
"Chief Complaint   Patient presents with     Anemia     needs labs     Needs labs so she knows if she still needs iron infusions.    Initial /84   Pulse 78   Temp 97.6  F (36.4  C) (Temporal)   Resp 16   Ht 1.619 m (5' 3.75\")   Wt 84.2 kg (185 lb 9.6 oz)   SpO2 98%   BMI 32.11 kg/m   Estimated body mass index is 32.11 kg/m  as calculated from the following:    Height as of this encounter: 1.619 m (5' 3.75\").    Weight as of this encounter: 84.2 kg (185 lb 9.6 oz).    Medication Reconciliation: complete      Norma J. Gosselin, LPN  "

## 2019-10-23 NOTE — PROGRESS NOTES
Labs  SUBJECTIVE:   Vikki Carrizales is a 54 year old female who presents to clinic today for the following health issues: Labs    Patient arrives here for labs.  She has a history of iron deficiency anemia.  States that she has had it for years.  More recently she is been intolerant to oral iron and has converted to IV iron.  States that the restless legs get more active when she is low on her iron.  She feels them getting worse.  Patient is never had a scope either upper or lower.  She does not know why she is having iron deficiency anemia.  She is status post gastric bypass.  Patient reports that there is a history of stomach cancer in the family with an uncle.  No history of colon cancer        Patient Active Problem List    Diagnosis Date Noted     Allergic rhinitis 01/26/2018     Priority: Medium     Esophageal reflux 01/26/2018     Priority: Medium     Restless leg syndrome 01/26/2018     Priority: Medium     Menorrhagia with irregular cycle 04/11/2017     Priority: Medium     Non morbid obesity due to excess calories 04/11/2017     Priority: Medium     Overview:   Had gastric bypass surgery -        Vitamin B12 deficiency 04/19/2011     Priority: Medium     Genital herpes 04/12/2011     Priority: Medium     Thrombophlebitis leg 12/31/2010     Priority: Medium     Depression, major, recurrent (H) 08/25/2005     Priority: Medium     Venous insufficiency 03/09/2005     Priority: Medium     Anemia, iron deficiency 06/09/2001     Priority: Medium     Asthma 05/01/2000     Priority: Medium     Past Medical History:   Diagnosis Date     Anemia      Anxiety      Cutaneous abscess     1/16/2018     Gastro-esophageal reflux disease without esophagitis     2003     Herpesviral infection of urogenital system     No Comments Provided     Impetigo     recurrent - as a child     Major depressive disorder, single episode     1990,Rx: zoloft, citalopram and prozac in the past, none since 2009)     Other seasonal allergic  "rhinitis     lifelong     Personal history of other medical treatment (CODE)      1 child  in childbirth (); one living child (1996)     Uncomplicated asthma          Vitamin D deficiency     No Comments Provided      Past Surgical History:   Procedure Laterality Date      SECTION      ,,HELLP syndrome, Platelet transfusions; son       SECTION           GASTRIC BYPASS       LAPAROSCOPIC BYPASS GASTRIC      , [place] en [place] Gastric Bypass, Dr Gabriel Torres, [facility]     Family History   Problem Relation Age of Onset     Other - See Comments Mother      Aortic aneurysm Father      Arrhythmia Father      Diabetes Maternal Grandmother         Diabetes     Other - See Comments Maternal Grandmother         Stroke     Other - See Comments Maternal Grandfather         Depression     Osteoporosis Maternal Grandfather         Osteoporosis     Diabetes Maternal Aunt         Diabetes     Other - See Comments Sister         Thrombophlebitis     Heart Disease Brother         Heart Disease     Breast Cancer No family hx of         Cancer-breast     Allergies   Allergen Reactions     Seasonal Allergies      seasonal       Review of Systems     OBJECTIVE:     /84   Pulse 78   Temp 97.6  F (36.4  C) (Temporal)   Resp 16   Ht 1.619 m (5' 3.75\")   Wt 84.2 kg (185 lb 9.6 oz)   SpO2 98%   BMI 32.11 kg/m    Body mass index is 32.11 kg/m .  Physical Exam  HENT:      Head: Normocephalic.   Cardiovascular:      Rate and Rhythm: Normal rate and regular rhythm.   Pulmonary:      Effort: Pulmonary effort is normal.      Breath sounds: Normal breath sounds.   Abdominal:      General: Abdomen is flat. There is no distension.      Palpations: There is no mass.      Tenderness: There is no tenderness. There is no guarding.   Neurological:      Mental Status: She is alert.         Diagnostic Test Results:  Results for orders placed or performed in visit on 10/23/19 (from the " past 24 hour(s))   CBC W PLT No Diff   Result Value Ref Range    WBC 6.1 4.0 - 11.0 10e9/L    RBC Count 4.54 3.8 - 5.2 10e12/L    Hemoglobin 14.6 11.7 - 15.7 g/dL    Hematocrit 44.2 35.0 - 47.0 %    MCV 97 78 - 100 fl    MCH 32.2 26.5 - 33.0 pg    MCHC 33.0 31.5 - 36.5 g/dL    RDW 12.9 10.0 - 15.0 %    Platelet Count 240 150 - 450 10e9/L       ASSESSMENT/PLAN:         1. Other iron deficiency anemia  CBC was normal rest the labs are pending patient would benefit from scopes upper and lower.  - CBC W PLT No Diff; Future  - Ferritin; Future  - Iron; Future  - GASTROENTEROLOGY ADULT REF PROCEDURE ONLY None  - Iron  - Ferritin  - CBC W PLT No Diff    2. Vitamin B12 deficiency  History of vitamin B12 deficiency.  Patient is not on B12 shots.  Will check  - Vitamin B12; Future  - Vitamin B12    3. Gastroesophageal reflux disease without esophagitis  Etiology of the iron deficiency anemia is unclear.        Malik Calderon MD  [facility]

## 2019-10-23 NOTE — LETTER
My Asthma Action Plan    Name: Vikki Carrizales   YOB: 1965  Date: 10/23/2019   My doctor: Malik Calderon MD   My clinic: [facility]        My Rescue Medicine:   Albuterol inhaler (Proair/Ventolin/Proventil HFA)  2-4 puffs EVERY 4 HOURS as needed. Use a spacer if recommended by your provider.   My Asthma Severity:   Intermittent / Exercise Induced  Know your asthma triggers: animal dander and exercise or sports             GREEN ZONE   Good Control    I feel good    No cough or wheeze    Can work, sleep and play without asthma symptoms       Take your asthma control medicine every day.     1. If exercise triggers your asthma, take your rescue medication    15 minutes before exercise or sports, and    During exercise if you have asthma symptoms  2. Spacer to use with inhaler: If you have a spacer, make sure to use it with your inhaler             YELLOW ZONE Getting Worse  I have ANY of these:    I do not feel good    Cough or wheeze    Chest feels tight    Wake up at night   1. Keep taking your Green Zone medications  2. Start taking your rescue medicine:    every 20 minutes for up to 1 hour. Then every 4 hours for 24-48 hours.  3. If you stay in the Yellow Zone for more than 12-24 hours, contact your doctor.  4. If you do not return to the Green Zone in 12-24 hours or you get worse, start taking your oral steroid medicine if prescribed by your provider.           RED ZONE Medical Alert - Get Help  I have ANY of these:    I feel awful    Medicine is not helping    Breathing getting harder    Trouble walking or talking    Nose opens wide to breathe       1. Take your rescue medicine NOW  2. If your provider has prescribed an oral steroid medicine, start taking it NOW  3. Call your doctor NOW  4. If you are still in the Red Zone after 20 minutes and you have not reached your doctor:    Take your rescue medicine again and    Call 911 or go to the emergency room right away    See  your regular doctor within 2 weeks of an Emergency Room or Urgent Care visit for follow-up treatment.          Annual Reminders:  Meet with Asthma Educator,  Flu Shot in the Fall, consider Pneumonia Vaccination for patients with asthma (aged 19 and older).    Pharmacy: [id number] - [place], [address]                        Asthma Triggers  How To Control Things That Make Your Asthma Worse    Triggers are things that make your asthma worse.  Look at the list below to help you find your triggers and   what you can do about them. You can help prevent asthma flare-ups by staying away from your triggers.      Trigger                                                          What you can do   Cigarette Smoke  Tobacco smoke can make asthma worse. Do not allow smoking in your home, car or around you.  Be sure no one smokes at a child s day care or school.  If you smoke, ask your health care provider for ways to help you quit.  Ask family members to quit too.  Ask your health care provider for a referral to Quit Plan to help you quit smoking, or call 1-537-024-PLAN.     Colds, Flu, Bronchitis  These are common triggers of asthma. Wash your hands often.  Don t touch your eyes, nose or mouth.  Get a flu shot every year.     Dust Mites  These are tiny bugs that live in cloth or carpet. They are too small to see. Wash sheets and blankets in hot water every week.   Encase pillows and mattress in dust mite proof covers.  Avoid having carpet if you can. If you have carpet, vacuum weekly.   Use a dust mask and HEPA vacuum.   Pollen and Outdoor Mold  Some people are allergic to trees, grass, or weed pollen, or molds. Try to keep your windows closed.  Limit time out doors when pollen count is high.   Ask you health care provider about taking medicine during allergy season.     Animal Dander  Some people are allergic to skin flakes, urine or saliva from pets with fur or feathers.  Keep pets with fur or feathers out of your home.    If you can t keep the pet outdoors, then keep the pet out of your bedroom.  Keep the bedroom door closed.  Keep pets off cloth furniture and away from stuffed toys.     Mice, Rats, and Cockroaches  Some people are allergic to the waste from these pests.   Cover food and garbage.  Clean up spills and food crumbs.  Store grease in the refrigerator.   Keep food out of the bedroom.   Indoor Mold  This can be a trigger if your home has high moisture. Fix leaking faucets, pipes, or other sources of water.   Clean moldy surfaces.  Dehumidify basement if it is damp and smelly.   Smoke, Strong Odors, and Sprays  These can reduce air quality. Stay away from strong odors and sprays, such as perfume, powder, hair spray, paints, smoke incense, paint, cleaning products, candles and new carpet.   Exercise or Sports  Some people with asthma have this trigger. Be active!  Ask your doctor about taking medicine before sports or exercise to prevent symptoms.    Warm up for 5-10 minutes before and after sports or exercise.     Other Triggers of Asthma  Cold air:  Cover your nose and mouth with a scarf.  Sometimes laughing or crying can be a trigger.  Some medicines and food can trigger asthma.

## 2019-10-23 NOTE — LETTER
My Depression Action Plan  Name: Vikki Carrizales   Date of Birth 1965  Date: 10/23/2019    My doctor: No Ref-Primary, Physician   My clinic: [facility]  [address]  [place] [id number] [phone]          GREEN    ZONE   Good Control    What it looks like:     Things are going generally well. You have normal up s and down s. You may even feel depressed from time to time, but bad moods usually last less than a day.   What you need to do:  1. Continue to care for yourself (see self care plan)  2. Check your depression survival kit and update it as needed  3. Follow your physician s recommendations including any medication.  4. Do not stop taking medication unless you consult with your physician first.           YELLOW         ZONE Getting Worse    What it looks like:     Depression is starting to interfere with your life.     It may be hard to get out of bed; you may be starting to isolate yourself from others.    Symptoms of depression are starting to last most all day and this has happened for several days.     You may have suicidal thoughts but they are not constant.   What you need to do:     1. Call your care team, your response to treatment will improve if you keep your care team informed of your progress. Yellow periods are signs an adjustment may need to be made.     2. Continue your self-care, even if you have to fake it!    3. Talk to someone in your support network    4. Open up your depression survival kit           RED    ZONE Medical Alert - Get Help    What it looks like:     Depression is seriously interfering with your life.     You may experience these or other symptoms: You can t get out of bed most days, can t work or engage in other necessary activities, you have trouble taking care of basic hygiene, or basic responsibilities, thoughts of suicide or death that will not go away, self-injurious behavior.     What you need to do:  1. Call your  care team and request a same-day appointment. If they are not available (weekends or after hours) call your local crisis line, emergency room or 911.            Depression Self Care Plan / Survival Kit    Self-Care for Depression  Here s the deal. Your body and mind are really not as separate as most people think.  What you do and think affects how you feel and how you feel influences what you do and think. This means if you do things that people who feel good do, it will help you feel better.  Sometimes this is all it takes.  There is also a place for medication and therapy depending on how severe your depression is, so be sure to consult with your medical provider and/ or Behavioral Health Consultant if your symptoms are worsening or not improving.     In order to better manage my stress, I will:    Exercise  Get some form of exercise, every day. This will help reduce pain and release endorphins, the  feel good  chemicals in your brain. This is almost as good as taking antidepressants!  This is not the same as joining a gym and then never going! (they count on that by the way ) It can be as simple as just going for a walk or doing some gardening, anything that will get you moving.      Hygiene   Maintain good hygiene (Get out of bed in the morning, Make your bed, Brush your teeth, Take a shower, and Get dressed like you were going to work, even if you are unemployed).  If your clothes don't fit try to get ones that do.    Diet  I will strive to eat foods that are good for me, drink plenty of water, and avoid excessive sugar, caffeine, alcohol, and other mood-altering substances.  Some foods that are helpful in depression are: complex carbohydrates, B vitamins, flaxseed, fish or fish oil, fresh fruits and vegetables.    Psychotherapy  I agree to participate in Individual Therapy (if recommended).    Medication  If prescribed medications, I agree to take them.  Missing doses can result in serious side effects.  I  understand that drinking alcohol, or other illicit drug use, may cause potential side effects.  I will not stop my medication abruptly without first discussing it with my provider.    Staying Connected With Others  I will stay in touch with my friends, family members, and my primary care provider/team.    Use your imagination  Be creative.  We all have a creative side; it doesn t matter if it s oil painting, sand castles, or mud pies! This will also kick up the endorphins.    Witness Beauty  (AKA stop and smell the roses) Take a look outside, even in mid-winter. Notice colors, textures. Watch the squirrels and birds.     Service to others  Be of service to others.  There is always someone else in need.  By helping others we can  get out of ourselves  and remember the really important things.  This also provides opportunities for practicing all the other parts of the program.    Humor  Laugh and be silly!  Adjust your TV habits for less news and crime-drama and more comedy.    Control your stress  Try breathing deep, massage therapy, biofeedback, and meditation. Find time to relax each day.     My support system    Clinic Contact:  Phone number:    Contact 1:  Phone number:    Contact 2:  Phone number:    Worship/:  Phone number:    Therapist:  Phone number:    Local crisis center:    Phone number:    Other community support:  Phone number:

## 2019-10-23 NOTE — LETTER
October 24, 2019      Vikik Carrizales  [address]   [facility] [id number]        Dear ,    We are writing to inform you of your test results.    Your test results fall within the expected range(s) or remain unchanged from previous results.  Please continue with current treatment plan.  Currently there is no evidence of iron deficiency anemia    Resulted Orders   Iron   Result Value Ref Range    Iron 89 50 - 212 ug/dL   Ferritin   Result Value Ref Range    Ferritin 49 23.9 - 336.2 ng/mL   CBC W PLT No Diff   Result Value Ref Range    WBC 6.1 4.0 - 11.0 10e9/L    RBC Count 4.54 3.8 - 5.2 10e12/L    Hemoglobin 14.6 11.7 - 15.7 g/dL    Hematocrit 44.2 35.0 - 47.0 %    MCV 97 78 - 100 fl    MCH 32.2 26.5 - 33.0 pg    MCHC 33.0 31.5 - 36.5 g/dL    RDW 12.9 10.0 - 15.0 %    Platelet Count 240 150 - 450 10e9/L   Vitamin B12   Result Value Ref Range    Vitamin B12 247 180 - 914 pg/mL       If you have any questions or concerns, please call the clinic at the number listed above.       Sincerely,        Malik Calderon MD

## 2019-10-23 NOTE — TELEPHONE ENCOUNTER
Patient referred by Dr. Calderon for a Diagnostic colonoscopy and Upper GI endoscopy ,  Diagnosis is iron deficiency anemia.   Please advise.   Thank you. Esperanza Portillo on 10/23/2019 at 1:56 PM

## 2019-10-28 DIAGNOSIS — D50.8 OTHER IRON DEFICIENCY ANEMIA: Primary | ICD-10-CM

## 2019-10-28 NOTE — TELEPHONE ENCOUNTER
Screening Questions for the Scheduling of Screening Colonoscopies   (If Colonoscopy is diagnostic, Provider should review the chart before scheduling.)  Are you younger than 50 or older than 80?  NO   Do you take aspirin or fish oil?  NO  (if yes, tell patient to stop 1 week prior to Colonoscopy)  Do you take warfarin (Coumadin), clopidogrel (Plavix), apixaban (Eliquis), dabigatram (Pradaxa), rivaroxaban (Xarelto) or any blood thinner? NO   Do you use oxygen at home?  NO   Do you have kidney disease? NO   Are you on dialysis? NO   Have you had a stroke or heart attack in the last year? NO   Have you had a stent in your heart or any blood vessel in the last year? NO   Have you had a transplant of any organ? NO   Have you had a colonoscopy or upper endoscopy (EGD) before? NO          When?    Date of scheduled Colonoscopy / EGD   11/05/2019  Provider [facility]      Pharmacy [facility]

## 2019-10-29 NOTE — TELEPHONE ENCOUNTER
Dr. Yadav is out of town until Tuesday.   Patient's scope is on Tuesday.    Can you ok the prep?    Wanda Scherer LPN  10/29/2019  3:54 PM

## 2019-10-30 RX ORDER — POLYETHYLENE GLYCOL 3350, SODIUM CHLORIDE, SODIUM BICARBONATE, POTASSIUM CHLORIDE 420; 11.2; 5.72; 1.48 G/4L; G/4L; G/4L; G/4L
4000 POWDER, FOR SOLUTION ORAL ONCE
Qty: 4000 ML | Refills: 0 | Status: SHIPPED | OUTPATIENT
Start: 2019-10-30 | End: 2019-10-30

## 2019-10-30 RX ORDER — BISACODYL 5 MG/1
TABLET, DELAYED RELEASE ORAL
Qty: 2 TABLET | Refills: 0 | Status: SHIPPED | OUTPATIENT
Start: 2019-10-30 | End: 2020-05-05

## 2019-11-05 ENCOUNTER — ANESTHESIA EVENT (OUTPATIENT)
Dept: SURGERY | Facility: OTHER | Age: 54
End: 2019-11-05
Payer: COMMERCIAL

## 2019-11-05 ENCOUNTER — HOSPITAL ENCOUNTER (OUTPATIENT)
Facility: OTHER | Age: 54
Discharge: HOME | End: 2019-11-05
Attending: SURGERY | Admitting: SURGERY
Payer: COMMERCIAL

## 2019-11-05 ENCOUNTER — ANESTHESIA (OUTPATIENT)
Dept: SURGERY | Facility: OTHER | Age: 54
End: 2019-11-05
Payer: COMMERCIAL

## 2019-11-05 VITALS
SYSTOLIC BLOOD PRESSURE: 154 MMHG | RESPIRATION RATE: 12 BRPM | DIASTOLIC BLOOD PRESSURE: 73 MMHG | OXYGEN SATURATION: 98 % | WEIGHT: 185 LBS | BODY MASS INDEX: 31.58 KG/M2 | TEMPERATURE: 98.2 F | HEART RATE: 67 BPM | HEIGHT: 64 IN

## 2019-11-05 DIAGNOSIS — D50.8 OTHER IRON DEFICIENCY ANEMIA: Primary | ICD-10-CM

## 2019-11-05 PROCEDURE — 25800030 ZZH RX IP 258 OP 636: Performed by: SURGERY

## 2019-11-05 PROCEDURE — 25000128 H RX IP 250 OP 636: Performed by: NURSE ANESTHETIST, CERTIFIED REGISTERED

## 2019-11-05 PROCEDURE — 40000010 ZZH STATISTIC ANES STAT CODE-CRNA PER MINUTE: Performed by: SURGERY

## 2019-11-05 PROCEDURE — 25000125 ZZHC RX 250: Performed by: SURGERY

## 2019-11-05 PROCEDURE — 43235 EGD DIAGNOSTIC BRUSH WASH: CPT | Performed by: SURGERY

## 2019-11-05 PROCEDURE — 45378 DIAGNOSTIC COLONOSCOPY: CPT | Performed by: SURGERY

## 2019-11-05 PROCEDURE — 25000132 ZZH RX MED GY IP 250 OP 250 PS 637: Performed by: SURGERY

## 2019-11-05 PROCEDURE — 45378 DIAGNOSTIC COLONOSCOPY: CPT | Performed by: NURSE ANESTHETIST, CERTIFIED REGISTERED

## 2019-11-05 PROCEDURE — 25000125 ZZHC RX 250: Performed by: NURSE ANESTHETIST, CERTIFIED REGISTERED

## 2019-11-05 RX ORDER — SIMETHICONE
LIQUID (ML) MISCELLANEOUS PRN
Status: DISCONTINUED | OUTPATIENT
Start: 2019-11-05 | End: 2019-11-05 | Stop reason: HOSPADM

## 2019-11-05 RX ORDER — LIDOCAINE 40 MG/G
CREAM TOPICAL
Status: DISCONTINUED | OUTPATIENT
Start: 2019-11-05 | End: 2019-11-05 | Stop reason: HOSPADM

## 2019-11-05 RX ORDER — PROPOFOL 10 MG/ML
INJECTION, EMULSION INTRAVENOUS PRN
Status: DISCONTINUED | OUTPATIENT
Start: 2019-11-05 | End: 2019-11-05

## 2019-11-05 RX ORDER — PROPOFOL 10 MG/ML
INJECTION, EMULSION INTRAVENOUS CONTINUOUS PRN
Status: DISCONTINUED | OUTPATIENT
Start: 2019-11-05 | End: 2019-11-05

## 2019-11-05 RX ORDER — NALOXONE HYDROCHLORIDE 0.4 MG/ML
.1-.4 INJECTION, SOLUTION INTRAMUSCULAR; INTRAVENOUS; SUBCUTANEOUS
Status: DISCONTINUED | OUTPATIENT
Start: 2019-11-05 | End: 2019-11-05 | Stop reason: HOSPADM

## 2019-11-05 RX ORDER — FLUMAZENIL 0.1 MG/ML
0.2 INJECTION, SOLUTION INTRAVENOUS
Status: DISCONTINUED | OUTPATIENT
Start: 2019-11-05 | End: 2019-11-05 | Stop reason: HOSPADM

## 2019-11-05 RX ORDER — SODIUM CHLORIDE, SODIUM LACTATE, POTASSIUM CHLORIDE, CALCIUM CHLORIDE 600; 310; 30; 20 MG/100ML; MG/100ML; MG/100ML; MG/100ML
INJECTION, SOLUTION INTRAVENOUS CONTINUOUS
Status: DISCONTINUED | OUTPATIENT
Start: 2019-11-05 | End: 2019-11-05 | Stop reason: HOSPADM

## 2019-11-05 RX ORDER — LIDOCAINE HYDROCHLORIDE 20 MG/ML
INJECTION, SOLUTION INFILTRATION; PERINEURAL PRN
Status: DISCONTINUED | OUTPATIENT
Start: 2019-11-05 | End: 2019-11-05

## 2019-11-05 RX ADMIN — PROPOFOL 135 MCG/KG/MIN: 10 INJECTION, EMULSION INTRAVENOUS at 12:17

## 2019-11-05 RX ADMIN — PROPOFOL 50 MG: 10 INJECTION, EMULSION INTRAVENOUS at 12:17

## 2019-11-05 RX ADMIN — LIDOCAINE HYDROCHLORIDE 60 MG: 20 INJECTION, SOLUTION INFILTRATION; PERINEURAL at 12:17

## 2019-11-05 RX ADMIN — SODIUM CHLORIDE, POTASSIUM CHLORIDE, SODIUM LACTATE AND CALCIUM CHLORIDE: 600; 310; 30; 20 INJECTION, SOLUTION INTRAVENOUS at 11:47

## 2019-11-05 ASSESSMENT — MIFFLIN-ST. JEOR: SCORE: 1420.18

## 2019-11-05 NOTE — OP NOTE
PROCEDURE NOTE    DATE OF SERVICE: 11/5/2019    SURGEON: MALI Yadav MD     PRE-OP DIAGNOSIS:  anemia    POST-OP DIAGNOSIS:  Anemia     PROCEDURE:   EGD  Colonoscopy     ASSISTANT:  Circulator: Jessenia Smalls RN  Relief Circulator: Myriam Brooke RN  Scrub Person: Myriam Dugan  2nd Scrub: Chelsey Glover  Pre-Op Nurse: Radha Roberts RN  Post-Op Nurse: Radha Roberts RN    ANESTHESIA:  MAC                            Monitor Anesthesia CareCRNA Independent: Rand Cameron APRN CRNA    INDICATION FOR THE PROCEDURE: Vikki Carrizales is a 54 year old female. The patient presents with anemia.  I explained to the patient the risks, benefits and alternatives to diagnostic EGD and colonoscopy for evaluating anemia. We specifically discussed the risks of bleeding, infection, perforation and the risks of sedation. The patient's questions were answered and the patient wished to proceed. Informed consent paperwork was completed.    PROCEDURE:The patient was taken to the endoscopy suite. Appropriate monitors were attached. The patient was placed in the left lateral decubitus position. Bite block was positioned.Timeout was performed confirming the patient's identity and procedure to be performed. After appropriate sedation was confirmed, the flexible endoscope was advanced into the oropharynx. The posterior oropharynx appeared grossly normal. The scope was advanced into the proximal esophagus. The esophagus was insufflated with air. The scope was advanced under direct visualization. No acute abnormalities of the esophagus were noted. The scope was advanced into the stomach. The scope was advanced through the stomach and in to the alimentary limb of the gastric bypass. No abnormalities were noted in the small bowel, no evidence of marginal ulcer or stricture.  The scope was returned to a neutral position and the stomach was decompressed. The scope was withdrawn to the GE junction and there were no  abnormalities noted. The mucosa of the esophagus was inspected while withdrawing the scope. No abnormalities were noted. The scope was withdrawn from the patient. The bite block was removed. The patient tolerated the procedure with no immediately apparent complication.     Rectal exam was performed.  There was normal tone and no palpable masses.  The colonoscope was introduced into the rectum and advanced to the cecum with Mild difficulty.  The patient's prep was good.  The terminal cecum was reached.  The cecum, ascending, transverse, descending and sigmoid colon were normal.  The scope was retroflexed in the rectum.  The rectum was unremarkable.  The scope was straightened and removed.  The patient tolerated the procedure well.     ESTIMATED BLOOD LOSS: none    COMPLICATIONS:  None    TISSUE REMOVED:  No    RECOMMEND:    Follow up colonoscopy in 5 years       MALI Yadav MD

## 2019-11-05 NOTE — ANESTHESIA CARE TRANSFER NOTE
Patient: Vikki Carrizales    Procedure(s):  ESOPHAGOGASTRODUODENOSCOPY (EGD)  COLONOSCOPY    Diagnosis: Anemia, unspecified type [D64.9]  Diagnosis Additional Information: No value filed.    Anesthesia Type:   MAC     Note:  Airway :Room Air  Patient transferred to:Phase II  Handoff Report: Identifed the Patient, Identified the Reponsible Provider, Reviewed the pertinent medical history, Discussed the surgical course, Reviewed Intra-OP anesthesia mangement and issues during anesthesia, Set expectations for post-procedure period and Allowed opportunity for questions and acknowledgement of understanding      Vitals: (Last set prior to Anesthesia Care Transfer)    CRNA VITALS  11/5/2019 1231 - 11/5/2019 1304      11/5/2019             Resp Rate (set):  10                Electronically Signed By: OUSMANE CALDWELL CRNA  November 5, 2019  1:04 PM

## 2019-11-05 NOTE — ANESTHESIA PREPROCEDURE EVALUATION
Anesthesia Pre-Procedure Evaluation    Patient: Vikki Carrizales   MRN: [id number] : 1965          Preoperative Diagnosis: Anemia, unspecified type [D64.9]    Procedure(s):  ESOPHAGOGASTRODUODENOSCOPY, WITH BIOPSY  COLONOSCOPY    Past Medical History:   Diagnosis Date     Anemia      Anxiety      Cutaneous abscess     2018     Gastro-esophageal reflux disease without esophagitis          Herpesviral infection of urogenital system     No Comments Provided     Impetigo     recurrent - as a child     Major depressive disorder, single episode     ,Rx: zoloft, citalopram and prozac in the past, none since )     Other seasonal allergic rhinitis     lifelong     Personal history of other medical treatment (CODE)      1 child  in childbirth (); one living child (1996)     Uncomplicated asthma          Vitamin D deficiency     No Comments Provided     Past Surgical History:   Procedure Laterality Date      SECTION      ,,HELLP syndrome, Platelet transfusions; son       SECTION           GASTRIC BYPASS       LAPAROSCOPIC BYPASS GASTRIC      , Lenore en Y Gastric Bypass, Dr Gabriel Torres, [facility]     MOUTH SURGERY         Anesthesia Evaluation     . Pt has had prior anesthetic.     No history of anesthetic complications          ROS/MED HX    ENT/Pulmonary:     (+)allergic rhinitis, Intermittent asthma Treatment: Inhaler prn,  , . .    Neurologic:       Cardiovascular:  - neg cardiovascular ROS       METS/Exercise Tolerance:  >4 METS   Hematologic:     (+) Anemia, -      Musculoskeletal: Comment: RLS        GI/Hepatic:     (+) GERD       Renal/Genitourinary:         Endo:     (+) Obesity, .      Psychiatric:     (+) psychiatric history depression      Infectious Disease:         Malignancy:      - no malignancy   Other:                          Physical Exam  Normal systems: cardiovascular, pulmonary and dental    Airway   Mallampati: II  TM  "distance: >3 FB  Neck ROM: full    Dental     Cardiovascular   Rhythm and rate: regular and normal      Pulmonary             Lab Results   Component Value Date    WBC 6.1 10/23/2019    HGB 14.6 10/23/2019    HCT 44.2 10/23/2019     10/23/2019     03/22/2019    POTASSIUM 4.0 03/22/2019    CHLORIDE 104 03/22/2019    CO2 27 03/22/2019    BUN 19 03/22/2019    CR 0.81 03/22/2019    GLC 85 03/22/2019    CURTIS 9.5 03/22/2019    ALBUMIN 4.2 03/22/2019    PROTTOTAL 7.2 03/22/2019    ALT 16 03/22/2019    AST 24 03/22/2019    ALKPHOS 70 03/22/2019    BILITOTAL 0.3 03/22/2019       Preop Vitals  BP Readings from Last 3 Encounters:   11/05/19 136/85   10/23/19 122/84   06/28/19 (!) 133/91    Pulse Readings from Last 3 Encounters:   11/05/19 70   10/23/19 78   06/28/19 72      Resp Readings from Last 3 Encounters:   11/05/19 16   10/23/19 16   06/28/19 16    SpO2 Readings from Last 3 Encounters:   11/05/19 97%   10/23/19 98%   03/25/19 97%      Temp Readings from Last 1 Encounters:   11/05/19 98.2  F (36.8  C) (Tympanic)    Ht Readings from Last 1 Encounters:   11/05/19 1.619 m (5' 3.75\")      Wt Readings from Last 1 Encounters:   11/05/19 83.9 kg (185 lb)    Estimated body mass index is 32 kg/m  as calculated from the following:    Height as of this encounter: 1.619 m (5' 3.75\").    Weight as of this encounter: 83.9 kg (185 lb).       Anesthesia Plan      History & Physical Review      ASA Status:  2 .    NPO Status:  > 8 hours    Plan for MAC     Risks, benefits and alternatives discussed and would like to proceed. General anesthesia ok if indicated.         Postoperative Care      Consents  Anesthetic plan, risks, benefits and alternatives discussed with:  Patient.  Use of blood products discussed: No .   .                 OUSMANE Jackson CRNA  "

## 2019-11-05 NOTE — DISCHARGE INSTRUCTIONS
[facility]  Adult Discharge Orders & Instructions    ________________________________________________________________          For 12 hours after surgery  1. Get plenty of rest.  A responsible adult must stay with you for at least 12 hours after you leave the hospital.   2. You may feel lightheaded.  IF so, sit for a few minutes before standing.  Have someone help you get up.   3. You may have a slight fever. Call the doctor if your fever is over 101 F (38.3 C) (taken under the tongue) or lasts longer than 24 hours.  4. You may have a dry mouth, a sore throat, muscle aches or trouble sleeping.  These should go away after 24 hours.  5. Do not make important or legal decisions.  6.   Do not drive or use heavy equipment.  If you have weakness or tingling, don't drive or use heavy equipment until this feeling goes away.    To contact a doctor, call   [phone]_______________________

## 2019-11-05 NOTE — INTERVAL H&P NOTE
I saw and examined Vikki Carrizales.  I have reviewed the history and physical and find no changes to the patient's medical status or condition with the exceptions noted below.   Vikki Carrizales denies family history of colon cancer. Patient denies change in bowel habits. Saw blood in stools a few weeks ago. No previous colonoscopy. History of gastric bypass ~17 yr ago. Has regained some weight.   The technical details of EGD and colonoscopy were discussed with the patient along with the risks and benefits to include bleeding, perforation, aspiration, and incomplete study. Vikik Carrizales demonstrated understanding and is willing to proceed.           Miguel A Yadav MD   11:56 AM 11/5/2019

## 2019-11-05 NOTE — ANESTHESIA POSTPROCEDURE EVALUATION
Patient: Vikki Carrizales    Procedure(s):  ESOPHAGOGASTRODUODENOSCOPY (EGD)  COLONOSCOPY    Diagnosis:Anemia, unspecified type [D64.9]  Diagnosis Additional Information: No value filed.    Anesthesia Type:  MAC    Note:  Anesthesia Post Evaluation    Patient location during evaluation: Phase 2  Patient participation: Able to fully participate in evaluation  Level of consciousness: awake and alert  Pain management: adequate  Airway patency: patent  Cardiovascular status: acceptable  Respiratory status: acceptable  Hydration status: acceptable  PONV: none     Anesthetic complications: None          Last vitals:  Vitals:    11/05/19 1304 11/05/19 1315 11/05/19 1330   BP: (!) 117/90 (!) 143/81 (!) 154/73   Pulse: 64 62 67   Resp: 14 14 12   Temp:      SpO2: 96% 94% 98%         Electronically Signed By: OUSMANE CALDWELL CRNA  November 5, 2019  2:13 PM

## 2020-03-02 DIAGNOSIS — F41.1 GAD (GENERALIZED ANXIETY DISORDER): Primary | ICD-10-CM

## 2020-03-02 RX ORDER — BUPROPION HYDROCHLORIDE 150 MG/1
300 TABLET ORAL EVERY MORNING
Qty: 180 TABLET | Refills: 1 | Status: SHIPPED | OUTPATIENT
Start: 2020-03-02 | End: 2020-09-02

## 2020-03-02 NOTE — TELEPHONE ENCOUNTER
Zainab WHITE sent Rx request for the following:      buPROPion (WELLBUTRIN XL) 150 MG 24 hr tablet  Sig: Take 2 tablets (300 mg) by mouth every morning  Last Prescription Date:   8/28/19  Last Fill Qty/Refills:         90, R-3    Last Office Visit:              10/23/19  Future Office visit:           None.    Prescription approved per [facility] Refill Protocol for 90 days and 1 additional refill. Mirna Keyes RN .............. 3/2/2020  10:28 AM

## 2020-04-27 DIAGNOSIS — J45.909 UNCOMPLICATED ASTHMA, UNSPECIFIED ASTHMA SEVERITY, UNSPECIFIED WHETHER PERSISTENT: ICD-10-CM

## 2020-04-27 NOTE — LETTER
April 28, 2020      Vikki Carrizales  [address]   [facility] [address]        Dear Vikki,     A refill of Albuterol Inhaler has been requested by your pharmacy and it appears you are a former patient of Trina Durbin.  As she is no longer with the clinic, we ask that you please contact [facility] for assistance in scheduling a medication management appointment with another one of our providers.    The refill request for this medication can then be discussed and addressed accordingly with your new primary care physician.  Your health is very important to us.  Please call the clinic at [phone] to schedule your appointment.    A limited reuben refill has been sent to your pharmacy to allow time to schedule your appointment.    Thank you for choosing [facility] and [facility] for your health care needs.        Sincerely,    Refill RN  [facility]

## 2020-04-28 RX ORDER — ALBUTEROL SULFATE 90 UG/1
2 AEROSOL, METERED RESPIRATORY (INHALATION) EVERY 6 HOURS PRN
Qty: 18 G | Refills: 0 | Status: SHIPPED | OUTPATIENT
Start: 2020-04-28 | End: 2020-05-20

## 2020-04-28 NOTE — TELEPHONE ENCOUNTER
A LIMITED 90 day refill will be provided to patient; patient contacted via letter to set up appointment with PCP. Prescription refilled per RN Medication Refill Policy.................... Cyn Ruano RN ....................  4/28/2020   2:09 PM      Last refill  3/25/2019  18g 11 Refills    LOV: 10/23/2019  No future visits planned

## 2020-05-05 ENCOUNTER — OFFICE VISIT (OUTPATIENT)
Dept: FAMILY MEDICINE | Facility: OTHER | Age: 55
End: 2020-05-05
Attending: FAMILY MEDICINE
Payer: COMMERCIAL

## 2020-05-05 VITALS
RESPIRATION RATE: 20 BRPM | TEMPERATURE: 97.2 F | WEIGHT: 182.2 LBS | DIASTOLIC BLOOD PRESSURE: 82 MMHG | SYSTOLIC BLOOD PRESSURE: 138 MMHG | HEART RATE: 68 BPM | BODY MASS INDEX: 31.52 KG/M2

## 2020-05-05 DIAGNOSIS — H92.01 RIGHT EAR PAIN: Primary | ICD-10-CM

## 2020-05-05 PROCEDURE — G0463 HOSPITAL OUTPT CLINIC VISIT: HCPCS

## 2020-05-05 PROCEDURE — 99213 OFFICE O/P EST LOW 20 MIN: CPT | Performed by: FAMILY MEDICINE

## 2020-05-05 RX ORDER — AMOXICILLIN 500 MG/1
1 CAPSULE ORAL EVERY 4 HOURS
COMMUNITY
Start: 2020-05-04 | End: 2020-10-06

## 2020-05-05 RX ORDER — HYDROCODONE BITARTRATE AND ACETAMINOPHEN 5; 325 MG/1; MG/1
1 TABLET ORAL
COMMUNITY
End: 2020-10-06

## 2020-05-05 ASSESSMENT — PAIN SCALES - GENERAL: PAINLEVEL: SEVERE PAIN (6)

## 2020-05-05 NOTE — NURSING NOTE
Patient here for right severe ear pain since January. She is currently on an antibiotic and pain med prescribed today by  dentist. Medication Reconciliation: complete.    Emy Jay LPN  5/5/2020 3:50 PM

## 2020-05-06 ENCOUNTER — MYC MEDICAL ADVICE (OUTPATIENT)
Dept: FAMILY MEDICINE | Facility: OTHER | Age: 55
End: 2020-05-06

## 2020-05-06 DIAGNOSIS — H92.01 RIGHT EAR PAIN: Primary | ICD-10-CM

## 2020-05-06 NOTE — PROGRESS NOTES
"  SUBJECTIVE:   Vikki Carrizales is a 54 year old female who presents to clinic today for the following health issues: Right ear pain    Patient arrives here for right ear pain.  States that is been going on since January.  She states that it started shortly after getting an injection for dental work.  She has been using \"tons\" of Tylenol or ibuprofen without any improvement.  She states is a constant 6.  She has been seen by the dentist on multiple occasions.  And states that they have given her antibiotics without any improvement.  She was being treated for a filling.  She is also been on a prednisone taper without any improvement.  She states it is deep in the ear.        Patient Active Problem List    Diagnosis Date Noted     Allergic rhinitis 01/26/2018     Priority: Medium     Esophageal reflux 01/26/2018     Priority: Medium     Restless leg syndrome 01/26/2018     Priority: Medium     Menorrhagia with irregular cycle 04/11/2017     Priority: Medium     Non morbid obesity due to excess calories 04/11/2017     Priority: Medium     Overview:   Had gastric bypass surgery -        Vitamin B12 deficiency 04/19/2011     Priority: Medium     Genital herpes 04/12/2011     Priority: Medium     Thrombophlebitis leg 12/31/2010     Priority: Medium     Depression, major, recurrent (H) 08/25/2005     Priority: Medium     Venous insufficiency 03/09/2005     Priority: Medium     Anemia, iron deficiency 06/09/2001     Priority: Medium     Asthma 05/01/2000     Priority: Medium     Past Medical History:   Diagnosis Date     Anemia      Anxiety      Cutaneous abscess     1/16/2018     Gastro-esophageal reflux disease without esophagitis     2003     Herpesviral infection of urogenital system     No Comments Provided     Impetigo     recurrent - as a child     Major depressive disorder, single episode     1990,Rx: zoloft, citalopram and prozac in the past, none since 2009)     Other seasonal allergic rhinitis     lifelong     " Personal history of other medical treatment (CODE)      1 child  in childbirth (); one living child (b )     Uncomplicated asthma          Vitamin D deficiency     No Comments Provided      Past Surgical History:   Procedure Laterality Date      SECTION      ,,HELLP syndrome, Platelet transfusions; son       SECTION           COLONOSCOPY  2019    Normal exam, 5 year follow up per Dr. Yadav     COLONOSCOPY N/A 2019    Procedure: COLONOSCOPY;  Surgeon: Miguel A Yadav MD;  Location: [facility] OR     ESOPHAGOSCOPY, GASTROSCOPY, DUODENOSCOPY (EGD), COMBINED N/A 2019    Procedure: ESOPHAGOGASTRODUODENOSCOPY (EGD);  Surgeon: Miguel A Yadav MD;  Location: [facility] OR     GASTRIC BYPASS       LAPAROSCOPIC BYPASS GASTRIC      , Lenore en Y Gastric Bypass, Dr Gabriel Torres, [facility]     MOUTH SURGERY       Current Outpatient Medications   Medication Sig Dispense Refill     acetaminophen (TYLENOL) 500 MG tablet Take 500-1,000 mg by mouth every 6 hours as needed for mild pain       albuterol (PROAIR HFA/PROVENTIL HFA/VENTOLIN HFA) 108 (90 Base) MCG/ACT inhaler Inhale 2 puffs into the lungs every 6 hours as needed for shortness of breath / dyspnea or wheezing 18 g 0     amoxicillin (AMOXIL) 500 MG capsule Take 1 capsule by mouth every 4 hours       buPROPion (WELLBUTRIN XL) 150 MG 24 hr tablet Take 2 tablets (300 mg) by mouth every morning 180 tablet 1     fluticasone (FLONASE) 50 MCG/ACT nasal spray Spray 2 sprays in nostril daily 18.2 mL 11     HYDROcodone-acetaminophen (NORCO) 5-325 MG tablet Take 1 tablet by mouth       traZODone (DESYREL) 50 MG tablet Take 0.5-1 tablets (25-50 mg) by mouth At Bedtime (Patient taking differently: Take 25-50 mg by mouth At Bedtime As needed) 30 tablet 11     Allergies   Allergen Reactions     Seasonal Allergies      seasonal       Review of Systems     OBJECTIVE:     /82   Pulse 68   Temp 97.2  F  (36.2  C)   Resp 20   Wt 82.6 kg (182 lb 3.2 oz)   BMI 31.52 kg/m    Body mass index is 31.52 kg/m .  Physical Exam  HENT:      Right Ear: Tympanic membrane normal.      Left Ear: Tympanic membrane and ear canal normal.      Nose: Nose normal.      Mouth/Throat:      Mouth: Mucous membranes are dry.      Comments: When palpating the throat soft tissue was palpated back on the mandible which produces quite a bit of pain and discomfort.  Neurological:      Mental Status: She is alert.         Diagnostic Test Results:  none     ASSESSMENT/PLAN:         1. Right ear pain  States is been going on since January and not improving will have patient see ENT.  Etiology of her pain is unclear.  - OTOLARYNGOLOGY REFERRAL      Malik Calderon MD  [facility] AND \[facility] [place] [facility]\""

## 2020-05-07 ENCOUNTER — MYC MEDICAL ADVICE (OUTPATIENT)
Dept: SURGERY | Facility: OTHER | Age: 55
End: 2020-05-07

## 2020-05-08 ENCOUNTER — HOSPITAL ENCOUNTER (OUTPATIENT)
Dept: CT IMAGING | Facility: OTHER | Age: 55
Discharge: HOME | End: 2020-05-08
Attending: FAMILY MEDICINE | Admitting: FAMILY MEDICINE
Payer: COMMERCIAL

## 2020-05-08 DIAGNOSIS — H92.01 RIGHT EAR PAIN: ICD-10-CM

## 2020-05-08 PROCEDURE — 25500064 ZZH RX 255 OP 636: Performed by: FAMILY MEDICINE

## 2020-05-08 PROCEDURE — 70491 CT SOFT TISSUE NECK W/DYE: CPT

## 2020-05-08 RX ADMIN — IOHEXOL 100 ML: 350 INJECTION, SOLUTION INTRAVENOUS at 08:50

## 2020-05-11 DIAGNOSIS — H92.01 RIGHT EAR PAIN: Primary | ICD-10-CM

## 2020-05-11 RX ORDER — GABAPENTIN 300 MG/1
300 CAPSULE ORAL 3 TIMES DAILY
Qty: 90 CAPSULE | Refills: 4 | Status: SHIPPED | OUTPATIENT
Start: 2020-05-11 | End: 2020-10-06

## 2020-05-11 NOTE — TELEPHONE ENCOUNTER
Patient sent another note stating that she was here last Friday and having testing done and wanted to be seen for her cyst.  Told her that we have appointments available for her to come in this week and have it checked out..  Joan Gutierrez LPN .......5/11/2020 9:08 AM

## 2020-05-12 ENCOUNTER — OFFICE VISIT (OUTPATIENT)
Dept: SURGERY | Facility: OTHER | Age: 55
End: 2020-05-12
Attending: SURGERY
Payer: COMMERCIAL

## 2020-05-12 VITALS
HEART RATE: 74 BPM | TEMPERATURE: 98.3 F | WEIGHT: 184.8 LBS | BODY MASS INDEX: 31.97 KG/M2 | DIASTOLIC BLOOD PRESSURE: 90 MMHG | RESPIRATION RATE: 20 BRPM | SYSTOLIC BLOOD PRESSURE: 130 MMHG

## 2020-05-12 DIAGNOSIS — L08.9 INFECTED CYST OF SKIN: Primary | ICD-10-CM

## 2020-05-12 DIAGNOSIS — L72.9 INFECTED CYST OF SKIN: Primary | ICD-10-CM

## 2020-05-12 PROCEDURE — 11402 EXC TR-EXT B9+MARG 1.1-2 CM: CPT | Performed by: SURGERY

## 2020-05-12 PROCEDURE — 88304 TISSUE EXAM BY PATHOLOGIST: CPT

## 2020-05-12 PROCEDURE — G0463 HOSPITAL OUTPT CLINIC VISIT: HCPCS

## 2020-05-12 ASSESSMENT — PAIN SCALES - GENERAL: PAINLEVEL: MILD PAIN (3)

## 2020-05-12 NOTE — NURSING NOTE
"Chief Complaint   Patient presents with     Derm Problem     cyst on upper right back       Initial BP (!) 130/90 (BP Location: Right arm, Patient Position: Sitting, Cuff Size: Adult Large)   Pulse 74   Temp 98.3  F (36.8  C) (Tympanic)   Resp 20   Wt 83.8 kg (184 lb 12.8 oz)   Breastfeeding No   BMI 31.97 kg/m   Estimated body mass index is 31.97 kg/m  as calculated from the following:    Height as of 11/5/19: 1.619 m (5' 3.75\").    Weight as of this encounter: 83.8 kg (184 lb 12.8 oz).  Medication Reconciliation: complete    Cynthia King LPN  "

## 2020-05-12 NOTE — PROGRESS NOTES
SUBJECTIVE:  54 year old female presents for lesion removal. This lesion has been previously drained when it was infected and then was fine, over the last couple of weeks it has gotten large and painful. It drained spontaneously a couple of days ago.     OBJECTIVE:  BP (!) 130/90 (BP Location: Right arm, Patient Position: Sitting, Cuff Size: Adult Large)   Pulse 74   Temp 98.3  F (36.8  C) (Tympanic)   Resp 20   Wt 83.8 kg (184 lb 12.8 oz)   Breastfeeding No   BMI 31.97 kg/m    General: no acute distress  Skin: upper back with 1.2 cm open area draining cheesy material and some purulent fluid    ASSESSMENT:  Lesion size: as above  Defect size: lesion and margin : 1.8 x 1.6 x 1.5 cm -simple partial closure    PROCEDURE:  The pathophysiology of skin lesions and skin cysts was discussed with the patient. The risks, benefits and alternatives to excision of the lesion were discussed with the patient, including the risks of infection,scarring, bruising, bleeding and the possible need for further procedures. The patient expressed understanding and wishes to proceed.    Betadine was used to cleanse the skin in the area of the lesion. 1% Lidocaine with epinephrine was infiltrated in the skin and subcutaneous tissue in the area of the lesion. When appropriate anesthesia had been achieved, the lesion was sharply excised and as much of the capsule was removed as possible. The wound was partially closed using  4-0 Vicryl. Sterile dressing was applied. The specimen was labelled and sent to pathology for evaluation. The procedure was well tolerated without complications. Patient was given post procedure instructions and denied further questions. We will call the patient with pathology results.

## 2020-05-12 NOTE — PATIENT INSTRUCTIONS
Your incision was closed with stitches that will need to be removed.     It is ok to remove the dressing and get the incision wet in the shower on the day after your procedure.     Don't soak in a tub, pool or lake for 5 days.  If you have concerns, please call.   Follow up in 6 weeks.

## 2020-05-12 NOTE — NURSING NOTE
"Chief Complaint   Patient presents with     Derm Problem     cyst on upper right back       Initial BP (!) 130/90 (BP Location: Right arm, Patient Position: Sitting, Cuff Size: Adult Large)   Pulse 74   Temp 98.3  F (36.8  C) (Tympanic)   Resp 20   Wt 83.8 kg (184 lb 12.8 oz)   Breastfeeding No   BMI 31.97 kg/m   Estimated body mass index is 31.97 kg/m  as calculated from the following:    Height as of 11/5/19: 1.619 m (5' 3.75\").    Weight as of this encounter: 83.8 kg (184 lb 12.8 oz).  Medication Reconciliation: complete    Cynthia King LPN     TIMEOUT  [facility] Protocol    A. Pre-procedure verification complete     1-relevant information / documentation available, reviewed and properly matched to the patient; 2-consent accurate and complete, 3-equipment and supplies available    B. Site marking complete     Site marked if not in continuous attendance with patient    C. TIME OUT completed     Time Out was conducted just prior to starting procedure to verify the eight required elements: 1-patient identity, 2-consent accurate and complete, 3-position, 4-correct side/site marked (if applicable), 5-procedure, 6-relevant images / results properly labeled and displayed (if applicable), 7-antibiotics / irrigation fluids (if applicable), 8-safety precautions.  "

## 2020-05-18 DIAGNOSIS — J45.909 UNCOMPLICATED ASTHMA, UNSPECIFIED ASTHMA SEVERITY, UNSPECIFIED WHETHER PERSISTENT: Primary | ICD-10-CM

## 2020-05-18 RX ORDER — ALBUTEROL SULFATE 90 UG/1
AEROSOL, METERED RESPIRATORY (INHALATION)
Qty: 18 G | Refills: 0 | Status: CANCELLED | OUTPATIENT
Start: 2020-05-18

## 2020-05-20 RX ORDER — ALBUTEROL SULFATE 90 UG/1
AEROSOL, METERED RESPIRATORY (INHALATION)
Qty: 54 G | Refills: 1 | Status: SHIPPED | OUTPATIENT
Start: 2020-05-20 | End: 2020-10-20

## 2020-05-20 NOTE — TELEPHONE ENCOUNTER
[id number] sent Rx request for the following:      ALBUTEROL HFA INH (200 PUFFS) 18GM     Sig: INHALE 2 PUFFS INTO THE LUNGS EVERY 6 HOURS AS NEEDED FOR SHORTNESS OF BREATH OR DIFFICULT BREATHING OR WHEEZING    Last Prescription Date:   4/28/20  Last Fill Qty/Refills:         18g, R-0    Last Office Visit:              5/5/20  Future Office visit:           None  Routing refill request to provider for review/approval because:  Asthma Maintenance Inhalers - Anticholinergics [id number]/20/2020 02:24 PM  Asthma control assessment score within normal limits in last 6 months   Short-Acting Beta Agonist Inhalers Protocol Failed  Asthma control assessment score within normal limits in last 6 months     Unable to complete prescription refill per RN Medication Refill Policy. Mirna Keyes RN .............. 5/20/2020  2:46 PM

## 2020-05-28 ENCOUNTER — TELEPHONE (OUTPATIENT)
Dept: FAMILY MEDICINE | Facility: OTHER | Age: 55
End: 2020-05-28

## 2020-05-28 DIAGNOSIS — H92.01 RIGHT EAR PAIN: Primary | ICD-10-CM

## 2020-05-28 NOTE — TELEPHONE ENCOUNTER
Dr. Schwab will not be seeing patients at [facility] for a while. Would you be willing to do referral to Dr. Harrison at [place]? Thank you!  Delisa Long on 5/28/2020 at 3:03 PM

## 2020-05-29 DIAGNOSIS — H90.3 ASYMMETRICAL SENSORINEURAL HEARING LOSS: Primary | ICD-10-CM

## 2020-06-02 ENCOUNTER — MYC MEDICAL ADVICE (OUTPATIENT)
Dept: FAMILY MEDICINE | Facility: OTHER | Age: 55
End: 2020-06-02

## 2020-06-02 DIAGNOSIS — H92.01 RIGHT EAR PAIN: Primary | ICD-10-CM

## 2020-06-03 ENCOUNTER — MYC MEDICAL ADVICE (OUTPATIENT)
Dept: FAMILY MEDICINE | Facility: OTHER | Age: 55
End: 2020-06-03

## 2020-06-03 DIAGNOSIS — H92.01 RIGHT EAR PAIN: Primary | ICD-10-CM

## 2020-06-03 RX ORDER — CARBAMAZEPINE 200 MG/1
200 TABLET, EXTENDED RELEASE ORAL 2 TIMES DAILY
Qty: 30 TABLET | Refills: 1 | Status: SHIPPED | OUTPATIENT
Start: 2020-06-03 | End: 2020-06-19

## 2020-06-03 RX ORDER — PREDNISONE 20 MG/1
TABLET ORAL
Qty: 20 TABLET | Refills: 0 | Status: SHIPPED | OUTPATIENT
Start: 2020-06-03 | End: 2020-10-06

## 2020-06-19 ENCOUNTER — MYC MEDICAL ADVICE (OUTPATIENT)
Dept: FAMILY MEDICINE | Facility: OTHER | Age: 55
End: 2020-06-19

## 2020-06-19 DIAGNOSIS — H92.01 RIGHT EAR PAIN: ICD-10-CM

## 2020-06-19 RX ORDER — CARBAMAZEPINE 200 MG/1
200 TABLET, EXTENDED RELEASE ORAL 2 TIMES DAILY
Qty: 60 TABLET | Refills: 1 | Status: SHIPPED | OUTPATIENT
Start: 2020-06-19 | End: 2020-08-19

## 2020-06-19 NOTE — TELEPHONE ENCOUNTER
Sending to Pending sale to [facility] ([facility])  as MBL is out of office till Monday.  Mary Lou La LPN ....................  6/19/2020   7:46 AM

## 2020-07-10 ENCOUNTER — MYC MEDICAL ADVICE (OUTPATIENT)
Dept: FAMILY MEDICINE | Facility: OTHER | Age: 55
End: 2020-07-10

## 2020-07-13 ENCOUNTER — NURSE TRIAGE (OUTPATIENT)
Dept: FAMILY MEDICINE | Facility: OTHER | Age: 55
End: 2020-07-13

## 2020-07-13 DIAGNOSIS — Z79.2 PROPHYLACTIC ANTIBIOTIC: Primary | ICD-10-CM

## 2020-07-13 RX ORDER — DOXYCYCLINE 100 MG/1
200 CAPSULE ORAL ONCE
Qty: 2 CAPSULE | Refills: 0 | Status: SHIPPED | OUTPATIENT
Start: 2020-07-13 | End: 2020-07-13

## 2020-07-13 NOTE — TELEPHONE ENCOUNTER
"Pt's  removed what he identified as a deer tick on 7/10/2020 from Pt's back and pt believes that tick attached itself on 7/8/2020.  States tick was flat and removed in its entirety.     States bite area was sore and really red at first but pain and redness has since subsided.  Noted approx 1/4\" on non-raised redness surrounding tick bite.  Denies bulls-eye/ring pattern/rash/red streaks around bite.  Also denies weakness, fatigue, fever, dizziness, abnormal heart rate/rhythm, confusion/short-term memory loss, headache, neck pain, rash, chills,     Pt did state her arms have been sore from elbows to hands. Denied hx of osteoporosis/arthritis       Pt did go on to c/o of sore arms from elbows down.  Unsure if she has done anything that would cause this soreness.  Pt denies hx of osteoporosis and/or arthritis.    Advised Pt that would route note to DOD to determine need to be seen/abx and to continue to monitor for symptoms/worsening of redness/arm soreness.  Advised Pt call back if bilateral lower arm pain persists and/or with any new symptoms.  The patient indicates understanding of these issues and agrees with the plan and will await a return call.     Reviewed allergies and verified pharmacy    Will maria isabel up abx and route to DOD     Additional Information    Negative: Tick bite with no complications    Probable deer tick that was attached > 36 hours (or tick appears swollen, not flat)    Red ring or bull's-eye rash occurs around a deer tick bite    Protocols used: TICK BITE-A-BROOKE Kimbrough RN  ....................  7/13/2020   4:28 PM      "

## 2020-07-13 NOTE — TELEPHONE ENCOUNTER
Please see triage encounter.    Carmina Kimbrough RN  ....................  7/13/2020   3:56 PM

## 2020-07-13 NOTE — TELEPHONE ENCOUNTER
Patient's history warrants prophylactic treatment for lyme disease with doxycycline. Prescription sent. If symptoms persist over the next week, have patient follow up in clinic for evaluation.   Milena Mchugh PA-C on 7/13/2020 at 4:45 PM

## 2020-07-13 NOTE — TELEPHONE ENCOUNTER
Contacted Pt and had poor cell phone reception.  Was able to understand Pt stating that she would drive a little further to obtain better cell service.  Relayed extension [phone] to Pt. Will await for call back.    Carmina Kimbrough RN  ....................  7/13/2020   2:27 PM

## 2020-07-13 NOTE — TELEPHONE ENCOUNTER
Contacted Pt and left message on personalized VMail relaying provider's message.    Carmina Kimbrough RN  ....................  7/13/2020   4:49 PM

## 2020-08-06 ENCOUNTER — OFFICE VISIT (OUTPATIENT)
Dept: AUDIOLOGY | Facility: OTHER | Age: 55
End: 2020-08-06
Attending: AUDIOLOGIST
Payer: COMMERCIAL

## 2020-08-06 ENCOUNTER — OFFICE VISIT (OUTPATIENT)
Dept: OTOLARYNGOLOGY | Facility: OTHER | Age: 55
End: 2020-08-06
Attending: AUDIOLOGIST
Payer: COMMERCIAL

## 2020-08-06 ENCOUNTER — MYC MEDICAL ADVICE (OUTPATIENT)
Dept: FAMILY MEDICINE | Facility: OTHER | Age: 55
End: 2020-08-06

## 2020-08-06 VITALS
WEIGHT: 175 LBS | SYSTOLIC BLOOD PRESSURE: 120 MMHG | DIASTOLIC BLOOD PRESSURE: 80 MMHG | HEART RATE: 74 BPM | OXYGEN SATURATION: 98 % | TEMPERATURE: 97.5 F | HEIGHT: 64 IN | BODY MASS INDEX: 29.88 KG/M2

## 2020-08-06 DIAGNOSIS — H90.3 ASYMMETRICAL SENSORINEURAL HEARING LOSS: ICD-10-CM

## 2020-08-06 DIAGNOSIS — M26.609 TMJ (TEMPOROMANDIBULAR JOINT SYNDROME): Primary | ICD-10-CM

## 2020-08-06 DIAGNOSIS — J34.3 HYPERTROPHY OF INFERIOR NASAL TURBINATE: ICD-10-CM

## 2020-08-06 DIAGNOSIS — R49.0 MUSCLE TENSION DYSPHONIA: ICD-10-CM

## 2020-08-06 DIAGNOSIS — H92.01 RIGHT EAR PAIN: ICD-10-CM

## 2020-08-06 DIAGNOSIS — H90.3 SENSORINEURAL HEARING LOSS (SNHL) OF BOTH EARS: Primary | ICD-10-CM

## 2020-08-06 DIAGNOSIS — J34.89 CONCHA BULLOSA: ICD-10-CM

## 2020-08-06 PROCEDURE — 92557 COMPREHENSIVE HEARING TEST: CPT | Performed by: AUDIOLOGIST

## 2020-08-06 PROCEDURE — 99203 OFFICE O/P NEW LOW 30 MIN: CPT | Mod: 25 | Performed by: OTOLARYNGOLOGY

## 2020-08-06 PROCEDURE — 92550 TYMPANOMETRY & REFLEX THRESH: CPT | Performed by: AUDIOLOGIST

## 2020-08-06 PROCEDURE — 92504 EAR MICROSCOPY EXAMINATION: CPT | Performed by: OTOLARYNGOLOGY

## 2020-08-06 PROCEDURE — G0463 HOSPITAL OUTPT CLINIC VISIT: HCPCS

## 2020-08-06 ASSESSMENT — MIFFLIN-ST. JEOR: SCORE: 1374.82

## 2020-08-06 ASSESSMENT — PAIN SCALES - GENERAL: PAINLEVEL: NO PAIN (0)

## 2020-08-06 NOTE — PROGRESS NOTES
Audiology Evaluation Completed. Please refer SCANNED AUDIOGRAM and/or TYMPANOGRAM for BACKGROUND, RESULTS, RECOMMENDATIONS.      Ria BROWN, [facility]  Audiologist #[id number]

## 2020-08-06 NOTE — NURSING NOTE
"Chief Complaint   Patient presents with     Consult     Right Ear Pain, TMJ; Referred by Malik Calderon       Initial /80 (BP Location: Left arm, Patient Position: Sitting, Cuff Size: Adult Regular)   Pulse 74   Temp 97.5  F (36.4  C) (Tympanic)   Ht 1.619 m (5' 3.75\")   Wt 83.5 kg (184 lb)   SpO2 98%   BMI 31.83 kg/m   Estimated body mass index is 31.83 kg/m  as calculated from the following:    Height as of this encounter: 1.619 m (5' 3.75\").    Weight as of this encounter: 83.5 kg (184 lb).  Medication Reconciliation: complete  Milena Schneider MA    "

## 2020-08-06 NOTE — LETTER
2020         RE: Vikki Carrizales  [address]  [facility] [address]        Dear Colleague,    Thank you for referring your patient, Vikki Carrizales, to the [facility]. Please see a copy of my visit note below.    Otolaryngology Consultation    Patient: Vikki Carrizales  : 1965    Patient presents with:  Consult: Right Ear Pain, TMJ; Referred by Malik Calderon      HPI:  Vikki Carrizales is a 54 year old female seen today for right otalgia  Symptoms present for over 1 month  Started with right otalgia described as right piercing 8-10/10 pain, deep sharp pain  This radiated into the right mandible    Preceding lidocaine injection from a dental procedure 6 months ago right side    She denies flux HL, vertigo, otorrhea      She has had some improvement on gabapentin then Tegretol  No further ear pain with tegretol  In the interium the tooth has been pulled, #31   She also has a new denture with better fit    Hx of  TMJ, she does note chronic bruxism and clenching  She wore braces as a child through her teenage years for described class II occlusion  She is also noticed about 4 to 6 months of an occasional vocal 'tic'  This does occur several times during the exam and sounds like her voice catches and she cannot vocalize.  No associated shortness of breath or wheezing  No weight loss no chronic pharyngitis no tobacco use no dysphasia    Audiogram todays date shows normal thresholds sloping to mild HFSNHL at 8 K  SRT 15 dB each ear, type A tymps each ear    She has been told in the past she has nasal polyps  Longstanding history of congestion which resolved with use of Flonase.  Has been on Flonase for over 20 years    CT soft tissue neck dated 2020 was reviewed and is negative the mastoids are well aerated bilaterally the ossicles are identified there is no middle ear mass no bony erosion..    Large obstructive nathaly bullosa bilaterally more pronounced on the left the  sinuses are clear.    Incidental finding of a lateralized small uncinate on the left with radiographic silent sinus syndrome no obvious TMJ joint narrowing    There is degenerative disc disease at C5-6 and 6 through 7    Current Outpatient Rx   Medication Sig Dispense Refill     acetaminophen (TYLENOL) 500 MG tablet Take 500-1,000 mg by mouth every 6 hours as needed for mild pain       albuterol (PROAIR HFA/PROVENTIL HFA/VENTOLIN HFA) 108 (90 Base) MCG/ACT inhaler INHALE 2 PUFFS INTO THE LUNGS EVERY 6 HOURS AS NEEDED FOR SHORTNESS OF BREATH OR DIFFICULT BREATHING OR WHEEZING 54 g 1     buPROPion (WELLBUTRIN XL) 150 MG 24 hr tablet Take 2 tablets (300 mg) by mouth every morning 180 tablet 1     carBAMazepine (TEGRETOL XR) 200 MG 12 hr tablet Take 1 tablet (200 mg) by mouth 2 times daily 60 tablet 1     fluticasone (FLONASE) 50 MCG/ACT nasal spray Spray 2 sprays in nostril daily 18.2 mL 11     amoxicillin (AMOXIL) 500 MG capsule Take 1 capsule by mouth every 4 hours       gabapentin (NEURONTIN) 300 MG capsule Take 1 capsule (300 mg) by mouth 3 times daily (Patient not taking: Reported on 8/6/2020) 90 capsule 4     HYDROcodone-acetaminophen (NORCO) 5-325 MG tablet Take 1 tablet by mouth       predniSONE (DELTASONE) 20 MG tablet Take 3 tabs by mouth daily x 3 days, then 2 tabs daily x 3 days, then 1 tab daily x 3 days, then 1/2 tab daily x 3 days. (Patient not taking: Reported on 8/6/2020) 20 tablet 0     traZODone (DESYREL) 50 MG tablet Take 0.5-1 tablets (25-50 mg) by mouth At Bedtime (Patient not taking: Reported on 8/6/2020) 30 tablet 11       Allergies: Seasonal allergies     Past Medical History:   Diagnosis Date     Anemia      Anxiety      Cutaneous abscess     1/16/2018     Gastro-esophageal reflux disease without esophagitis     2003     Herpesviral infection of urogenital system     No Comments Provided     Impetigo     recurrent - as a child     Major depressive disorder, single episode     1990,Rx: zoloft,  "citalopram and prozac in the past, none since )     Other seasonal allergic rhinitis     lifelong     Personal history of other medical treatment (CODE)      1 child  in childbirth (); one living child (1996)     Uncomplicated asthma          Vitamin D deficiency     No Comments Provided       Past Surgical History:   Procedure Laterality Date      SECTION      ,,HELLP syndrome, Platelet transfusions; son       SECTION           COLONOSCOPY  2019    Normal exam, 5 year follow up per Dr. Yadav     COLONOSCOPY N/A 2019    Procedure: COLONOSCOPY;  Surgeon: Miguel A Yadav MD;  Location:  OR     ESOPHAGOSCOPY, GASTROSCOPY, DUODENOSCOPY (EGD), COMBINED N/A 2019    Procedure: ESOPHAGOGASTRODUODENOSCOPY (EGD);  Surgeon: Miguel A Yadav MD;  Location:  OR     GASTRIC BYPASS       LAPAROSCOPIC BYPASS GASTRIC      , Lenore en Y Gastric Bypass, Dr Gabriel Torres, [facility]     MOUTH SURGERY         ENT family history reviewed    Social History     Tobacco Use     Smoking status: Never Smoker     Smokeless tobacco: Never Used   Substance Use Topics     Alcohol use: Yes     Comment: moderate, about 1 bottle of wine per week     Drug use: No       Review of Systems  ROS: 10 point ROS neg other than the symptoms noted above in the HPI and heartburn with a history of gastric bypass some blurred change in vision muscle weakness depression and memory loss, myalgias since a tick bite over 1 month ago    Physical Exam  /80 (BP Location: Left arm, Patient Position: Sitting, Cuff Size: Adult Regular)   Pulse 74   Temp 97.5  F (36.4  C) (Tympanic)   Ht 1.619 m (5' 3.75\")   Wt 79.4 kg (175 lb)   SpO2 98%   BMI 30.27 kg/m    General - The patient is well nourished and well developed, and appears to have good nutritional status.  Alert and oriented to person and place, answers questions and cooperates with examination " appropriately.   Head and Face - Normocephalic and atraumatic, with no gross asymmetry noted.  The facial nerve is intact, with strong symmetric movements.  Voice and Breathing - The patient was breathing comfortably without the use of accessory muscles. There was no wheezing, stridor, or stertor.  The patients voice was clear and strong, and had appropriate pitch and quality.  No shorty peripheral digital clubbing or cyanosis   Ears -examined under microscopy bilaterally  The external auditory canals are patent, the tympanic membranes are intact without effusion, retraction or mass.  Bony landmarks are intact.  TMJ with severe click and crepitus with opening bilaterally, masseters tender bilaterally worse right  Eyes - Extraocular movements intact, and the pupils were reactive to light.  Sclera were not icteric or injected, conjunctiva were pink and moist.  Mouth - Examination of the oral cavity showed pink, healthy oral mucosa. No lesions or ulcerations noted.  The tongue was mobile and midline, and the dentition were in fair condition.    Throat - The walls of the oropharynx were smooth, pink, moist, symmetric, and had no lesions or ulcerations.  The tonsillar pillars and soft palate were symmetric.  The uvula was midline on elevation.  Grade 2 symmetric tonsils  Neck - tight strap mm bilaterally, No palpable enlarged fixed cervical lymph nodes.  No neck cysts or unusual tenderness to palpation.   No palpable fixed thyroid nodules or concerning goiter.  The trachea is grossly midline.   Nose - External contour is symmetric, no gross deflection or scars.  Nasal mucosa is pink and moist with no abnormal mucus.  The septum and turbinates were evaluated: bilateral large nathaly bullosa, bilateral  inferior turbinate hypertrophy .  No polyps, masses, or purulence noted on examination.      Impression and Plan- Vikki Carrizales is a 54 year old female with:    ICD-10-CM    1. TMJ (temporomandibular joint syndrome)  M26.609  PHYSICAL THERAPY REFERRAL   2. right referred otalgia, neurogenic  H92.01    3. Muscle tension dysphonia  R49.0 SPEECH THERAPY REFERRAL   4. Nathaly bullosa  J34.89    5. Hypertrophy of inferior nasal turbinate  J34.3      reassured normal ear exam but severe TMJ and muscle tension dysphonia    Follow TMJ Instructions  Start TMJ Physical Therapy and Voice Therapy  Diagnosis: Bilateral Nathaly Bullosa, Bilateral Inferior Turbinate Hypertrophy  Follow up with Celena Patterson in 2 months for laryngoscopy      If congestion worsens follow-up and surgery for endoscopic excision bilateral nathaly bullosa and bilateral submucous reduction inferior turbinates this was briefly discussed today.  She was reassured she does not have nasal polyps      Maria G Harrison D.O.  Otolaryngology/Head and Neck Surgery  Allergy        Again, thank you for allowing me to participate in the care of your patient.        Sincerely,        Maria G Harrison MD

## 2020-08-06 NOTE — PATIENT INSTRUCTIONS
Thank you for allowing Dr. Harrison and our ENT team to participate in your care.  If your medications are too expensive, please give the nurse a call.  We can possibly change this medication.  If you have a scheduling or an appointment question please contact our Health Unit Coordinator at their direct line [phone].   ALL nursing questions or concerns can be directed to your ENT nurse at: [phone] - Tam[place]    Follow TMJ Instructions  Start TMJ Physical Therapy and Voice Therapy  Diagnosis: Bilateral Paige Bullosa, Bilateral Inferior Turbinate Hypertrophy  Follow up with Celena Patterson in 2 months

## 2020-08-06 NOTE — PROGRESS NOTES
Otolaryngology Consultation    Patient: Vikki Carrizales  : 1965    Patient presents with:  Consult: Right Ear Pain, TMJ; Referred by Malik Calderon      HPI:  Vikki Carrizales is a 54 year old female seen today for right otalgia  Symptoms present for over 1 month  Started with right otalgia described as right piercing 8-10/10 pain, deep sharp pain  This radiated into the right mandible    Preceding lidocaine injection from a dental procedure 6 months ago right side    She denies flux HL, vertigo, otorrhea      She has had some improvement on gabapentin then Tegretol  No further ear pain with tegretol  In the interium the tooth has been pulled, #31   She also has a new denture with better fit    Hx of  TMJ, she does note chronic bruxism and clenching  She wore braces as a child through her teenage years for described class II occlusion  She is also noticed about 4 to 6 months of an occasional vocal 'tic'  This does occur several times during the exam and sounds like her voice catches and she cannot vocalize.  No associated shortness of breath or wheezing  No weight loss no chronic pharyngitis no tobacco use no dysphasia    Audiogram todays date shows normal thresholds sloping to mild HFSNHL at 8 K  SRT 15 dB each ear, type A tymps each ear    She has been told in the past she has nasal polyps  Longstanding history of congestion which resolved with use of Flonase.  Has been on Flonase for over 20 years    CT soft tissue neck dated 2020 was reviewed and is negative the mastoids are well aerated bilaterally the ossicles are identified there is no middle ear mass no bony erosion..    Large obstructive nathaly bullosa bilaterally more pronounced on the left the sinuses are clear.    Incidental finding of a lateralized small uncinate on the left with radiographic silent sinus syndrome no obvious TMJ joint narrowing    There is degenerative disc disease at C5-6 and 6 through 7    Current Outpatient Rx   Medication Sig  Dispense Refill     acetaminophen (TYLENOL) 500 MG tablet Take 500-1,000 mg by mouth every 6 hours as needed for mild pain       albuterol (PROAIR HFA/PROVENTIL HFA/VENTOLIN HFA) 108 (90 Base) MCG/ACT inhaler INHALE 2 PUFFS INTO THE LUNGS EVERY 6 HOURS AS NEEDED FOR SHORTNESS OF BREATH OR DIFFICULT BREATHING OR WHEEZING 54 g 1     buPROPion (WELLBUTRIN XL) 150 MG 24 hr tablet Take 2 tablets (300 mg) by mouth every morning 180 tablet 1     carBAMazepine (TEGRETOL XR) 200 MG 12 hr tablet Take 1 tablet (200 mg) by mouth 2 times daily 60 tablet 1     fluticasone (FLONASE) 50 MCG/ACT nasal spray Spray 2 sprays in nostril daily 18.2 mL 11     amoxicillin (AMOXIL) 500 MG capsule Take 1 capsule by mouth every 4 hours       gabapentin (NEURONTIN) 300 MG capsule Take 1 capsule (300 mg) by mouth 3 times daily (Patient not taking: Reported on 2020) 90 capsule 4     HYDROcodone-acetaminophen (NORCO) 5-325 MG tablet Take 1 tablet by mouth       predniSONE (DELTASONE) 20 MG tablet Take 3 tabs by mouth daily x 3 days, then 2 tabs daily x 3 days, then 1 tab daily x 3 days, then 1/2 tab daily x 3 days. (Patient not taking: Reported on 2020) 20 tablet 0     traZODone (DESYREL) 50 MG tablet Take 0.5-1 tablets (25-50 mg) by mouth At Bedtime (Patient not taking: Reported on 2020) 30 tablet 11       Allergies: Seasonal allergies     Past Medical History:   Diagnosis Date     Anemia      Anxiety      Cutaneous abscess     2018     Gastro-esophageal reflux disease without esophagitis          Herpesviral infection of urogenital system     No Comments Provided     Impetigo     recurrent - as a child     Major depressive disorder, single episode     ,Rx: zoloft, citalopram and prozac in the past, none since )     Other seasonal allergic rhinitis     lifelong     Personal history of other medical treatment (CODE)      1 child  in childbirth (); one living child (b )     Uncomplicated asthma       "    Vitamin D deficiency     No Comments Provided       Past Surgical History:   Procedure Laterality Date      SECTION      ,,HELLP syndrome, Platelet transfusions; son       SECTION           COLONOSCOPY  2019    Normal exam, 5 year follow up per Dr. Yadav     COLONOSCOPY N/A 2019    Procedure: COLONOSCOPY;  Surgeon: Miguel A Yadav MD;  Location: [facility] OR     ESOPHAGOSCOPY, GASTROSCOPY, DUODENOSCOPY (EGD), COMBINED N/A 2019    Procedure: ESOPHAGOGASTRODUODENOSCOPY (EGD);  Surgeon: Miguel A Yadav MD;  Location: [facility] OR     GASTRIC BYPASS       LAPAROSCOPIC BYPASS GASTRIC      , Lenore en Y Gastric Bypass, Dr Gabriel Torres, [facility]     MOUTH SURGERY         ENT family history reviewed    Social History     Tobacco Use     Smoking status: Never Smoker     Smokeless tobacco: Never Used   Substance Use Topics     Alcohol use: Yes     Comment: moderate, about 1 bottle of wine per week     Drug use: No       Review of Systems  ROS: 10 point ROS neg other than the symptoms noted above in the HPI and heartburn with a history of gastric bypass some blurred change in vision muscle weakness depression and memory loss, myalgias since a tick bite over 1 month ago    Physical Exam  /80 (BP Location: Left arm, Patient Position: Sitting, Cuff Size: Adult Regular)   Pulse 74   Temp 97.5  F (36.4  C) (Tympanic)   Ht 1.619 m (5' 3.75\")   Wt 79.4 kg (175 lb)   SpO2 98%   BMI 30.27 kg/m    General - The patient is well nourished and well developed, and appears to have good nutritional status.  Alert and oriented to person and place, answers questions and cooperates with examination appropriately.   Head and Face - Normocephalic and atraumatic, with no gross asymmetry noted.  The facial nerve is intact, with strong symmetric movements.  Voice and Breathing - The patient was breathing comfortably without the use of accessory muscles. There was no " wheezing, stridor, or stertor.  The patients voice was clear and strong, and had appropriate pitch and quality.  No shorty peripheral digital clubbing or cyanosis   Ears -examined under microscopy bilaterally  The external auditory canals are patent, the tympanic membranes are intact without effusion, retraction or mass.  Bony landmarks are intact.  TMJ with severe click and crepitus with opening bilaterally, masseters tender bilaterally worse right  Eyes - Extraocular movements intact, and the pupils were reactive to light.  Sclera were not icteric or injected, conjunctiva were pink and moist.  Mouth - Examination of the oral cavity showed pink, healthy oral mucosa. No lesions or ulcerations noted.  The tongue was mobile and midline, and the dentition were in fair condition.    Throat - The walls of the oropharynx were smooth, pink, moist, symmetric, and had no lesions or ulcerations.  The tonsillar pillars and soft palate were symmetric.  The uvula was midline on elevation.  Grade 2 symmetric tonsils  Neck - tight strap mm bilaterally, No palpable enlarged fixed cervical lymph nodes.  No neck cysts or unusual tenderness to palpation.   No palpable fixed thyroid nodules or concerning goiter.  The trachea is grossly midline.   Nose - External contour is symmetric, no gross deflection or scars.  Nasal mucosa is pink and moist with no abnormal mucus.  The septum and turbinates were evaluated: bilateral large paige bullosa, bilateral  inferior turbinate hypertrophy .  No polyps, masses, or purulence noted on examination.      Impression and Plan- Vikki Carrizales is a 54 year old female with:    ICD-10-CM    1. TMJ (temporomandibular joint syndrome)  M26.609 PHYSICAL THERAPY REFERRAL   2. right referred otalgia, neurogenic  H92.01    3. Muscle tension dysphonia  R49.0 SPEECH THERAPY REFERRAL   4. Paige bullosa  J34.89    5. Hypertrophy of inferior nasal turbinate  J34.3      reassured normal ear exam but severe TMJ and  muscle tension dysphonia    Follow TMJ Instructions  Start TMJ Physical Therapy and Voice Therapy  Diagnosis: Bilateral Nathaly Bullosa, Bilateral Inferior Turbinate Hypertrophy  Follow up with Celena Patterson in 2 months for laryngoscopy      If congestion worsens follow-up and surgery for endoscopic excision bilateral nathaly bullosa and bilateral submucous reduction inferior turbinates this was briefly discussed today.  She was reassured she does not have nasal polyps      Maria G Harrison D.O.  Otolaryngology/Head and Neck Surgery  Allergy

## 2020-08-18 DIAGNOSIS — H92.01 RIGHT EAR PAIN: ICD-10-CM

## 2020-08-19 RX ORDER — CARBAMAZEPINE 200 MG/1
TABLET, EXTENDED RELEASE ORAL
Qty: 60 TABLET | Refills: 1 | Status: SHIPPED | OUTPATIENT
Start: 2020-08-19 | End: 2020-10-06

## 2020-08-19 NOTE — TELEPHONE ENCOUNTER
Routing refill request to provider for review/approval because:   Review Authorizing provider's last note.  Protocol Details    Carbamazepine level within therapeutic range in last 26 months      LOV: 5/5/2020  Cristy Torres RN on 8/19/2020 at 8:37 AM

## 2020-09-01 DIAGNOSIS — F41.1 GAD (GENERALIZED ANXIETY DISORDER): ICD-10-CM

## 2020-09-02 RX ORDER — BUPROPION HYDROCHLORIDE 150 MG/1
TABLET ORAL
Qty: 180 TABLET | Refills: 1 | Status: SHIPPED | OUTPATIENT
Start: 2020-09-02 | End: 2021-03-09

## 2020-09-02 NOTE — TELEPHONE ENCOUNTER
"[facility] sent Rx request for the following:   buPROPion (WELLBUTRIN XL) 150 MG 24 hr tablet   Sig:TAKE 2 TABLETS(300 MG) BY MOUTH EVERY MORNING    Last Prescription Date:   03/02/2020  Last Fill Qty/Refills:         180, R-1    Last Office Visit:              05/05/2020 ([facility])   Future Office visit:           None noted     SSRIs Protocol Passed -         Passed - Recent (12 mo) or future (30 days) visit within the authorizing provider's specialty     Patient has had an office visit with the authorizing provider or a provider within the authorizing providers department within the previous 12 mos or has a future within next 30 days. See \"Patient Info\" tab in inbasket, or \"Choose Columns\" in Meds & Orders section of the refill encounter.              Passed - Medication is Bupropion     If the medication is Bupropion (Wellbutrin), and the patient is taking for smoking cessation; OK to refill.          Passed - Medication is active on med list        Passed - Patient is age 18 or older        Passed - No active pregnancy on record        Passed - No positive pregnancy test in last 12 months           Prescription approved per [facility] [place].  Yvonne Izaguirre RN ....................  9/2/2020   12:02 PM      "

## 2020-09-16 ENCOUNTER — MYC MEDICAL ADVICE (OUTPATIENT)
Dept: FAMILY MEDICINE | Facility: OTHER | Age: 55
End: 2020-09-16

## 2020-09-20 ENCOUNTER — VIRTUAL VISIT (OUTPATIENT)
Dept: FAMILY MEDICINE | Facility: OTHER | Age: 55
End: 2020-09-20

## 2020-09-20 NOTE — PROGRESS NOTES
"Date: 2020 10:54:26  Clinician: Liseth Summers  Clinician NPI: [id number]  Patient: Vikki Carrizales  Patient : 1965  Patient Address: [address] [place] [id number]  Patient Phone: [phone]  Visit Protocol: URI  Patient Summary:  Vikki is a 55 year old ( : 1965 ) female who initiated a [facility] Visit for COVID-19 (Coronavirus) evaluation and screening. When asked the question \"Please sign me up to receive news, health information and promotions. \", Vikki responded \"No\".    When asked when her symptoms started, Vikki reported that she does not have any symptoms.   She denies taking antibiotic medication in the past month and having recent facial or sinus surgery in the past 60 days.    Pertinent COVID-19 (Coronavirus) information  In the past 14 days, Vikki has not worked in a congregate living setting.   She does not work or volunteer as healthcare worker or a  and does not work or volunteer in a healthcare facility.   Vikki also has not lived in a congregate living setting in the past 14 days. She does not live with a healthcare worker.   Vikki has had a close contact with a laboratory-confirmed COVID-19 patient in the last 14 days. Additional information about contact with COVID-19 (Coronavirus) patient as reported by the patient (free text): The date was , it was at my sisters house. We were all unmasked and eating together then shaking hands and hugging goodbye after a long weekend together. My brother in law was present and unmasked at the time, after spending two days masked and in self-quarantine for symptoms. The day after this, 4 of the 6 people present were tested negative. The following day my brother in law tested positive. I have not been tested.   Patient reported they are not living in the same household with a COVID-19 positive patient.  Patient was in an enclosed space for greater than 15 minutes with a COVID-19 patient.  Since " December 2019, Vikki and has had upper respiratory infection (URI) or influenza-like illness. Has not been diagnosed with lab-confirmed COVID-19 test      Date(s) of previous URI or influenza-like illness (free-text): March 2020 and August 2020     Symptoms Vikki experienced during previous URI or influenza-like illness as reported by the patient (free-text): Runny nose, terrible body aches and cough with chest congestion.        Pertinent medical history  Vikki does not get yeast infections when she takes antibiotics.   Vikki does not need a return to work/school note.   Weight: 170 lbs   Vikki does not smoke or use smokeless tobacco.   Weight: 170 lbs    MEDICATIONS: albuterol sulfate inhalation, bupropion HCl oral, trazodone oral, carbamazepine oral, fluticasone propionate inhalation, ALLERGIES: NKDA  Clinician Response:  Dear Vikki,   Your symptoms show that you may have coronavirus (COVID-19). This illness can cause fever, cough and trouble breathing. Many people get a mild case and get better on their own. Some people can get very sick.  What should I do?  We would like to test you for this virus.   1. Please call [phone] to schedule your visit. Explain that you were referred by [facility] to have a COVID-19 test. Be ready to share your [facility] visit ID number.  The following will serve as your written order for this COVID Test, ordered by me, for the indication of suspected COVID [Z20.828]: The test will be ordered in [employer], our electronic health record, after you are scheduled. It will show as ordered and authorized by Tito Freeman MD.  Order: COVID-19 (Coronavirus) PCR for SYMPTOMATIC testing from [facility].      2. When it's time for your COVID test:  Stay at least 6 feet away from others. (If someone will drive you to your test, stay in the backseat, as far away from the  as you can.)   Cover your mouth and nose with a mask, tissue or washcloth.  Go straight to the testing site. Don't make any stops on  "the way there or back.      3.Starting now: Stay home and away from others (self-isolate) until:   You've had no fever---and no medicine that reduces fever---for one full day (24 hours). And...   Your other symptoms have gotten better. For example, your cough or breathing has improved. And...   At least 10 days have passed since your symptoms started.       During this time, don't leave the house except for testing or medical care.   Stay in your own room, even for meals. Use your own bathroom if you can.   Stay away from others in your home. No hugging, kissing or shaking hands. No visitors.  Don't go to work, school or anywhere else.    Clean \"high touch\" surfaces often (doorknobs, counters, handles, etc.). Use a household cleaning spray or wipes. You'll find a full list of  on the EPA website: www.epa.gov/pesticide-registration/list-n-disinfectants-use-against-sars-cov-2.   Cover your mouth and nose with a mask, tissue or washcloth to avoid spreading germs.  Wash your hands and face often. Use soap and water.  Caregivers in these groups are at risk for severe illness due to COVID-19:  o People 65 years and older  o People who live in a nursing home or long-term care facility  o People with chronic disease (lung, heart, cancer, diabetes, kidney, liver, immunologic)  o People who have a weakened immune system, including those who:   Are in cancer treatment  Take medicine that weakens the immune system, such as corticosteroids  Had a bone marrow or organ transplant  Have an immune deficiency  Have poorly controlled HIV or AIDS  Are obese (body mass index of 40 or higher)  Smoke regularly   o Caregivers should wear gloves while washing dishes, handling laundry and cleaning bedrooms and bathrooms.  o Use caution when washing and drying laundry: Don't shake dirty laundry, and use the warmest water setting that you can.  o For more tips, go to www.cdc.gov/coronavirus/2019-ncov/downloads/10Things.pdf.    4.Sign " up for [employer]. We know it's scary to hear that you might have COVID-19. We want to track your symptoms to make sure you're okay over the next 2 weeks. Please look for an email from [employer]---this is a free, online program that we'll use to keep in touch. To sign up, follow the link in the email. Learn more at [email or web address]  How can I take care of myself?   Get lots of rest. Drink extra fluids (unless a doctor has told you not to).   Take Tylenol (acetaminophen) for fever or pain. If you have liver or kidney problems, ask your family doctor if it's okay to take Tylenol.   Adults can take either:    650 mg (two 325 mg pills) every 4 to 6 hours, or...   1,000 mg (two 500 mg pills) every 8 hours as needed.    Note: Don't take more than 3,000 mg in one day. Acetaminophen is found in many medicines (both prescribed and over-the-counter medicines). Read all labels to be sure you don't take too much.   For children, check the Tylenol bottle for the right dose. The dose is based on the child's age or weight.    If you have other health problems (like cancer, heart failure, an organ transplant or severe kidney disease): Call your specialty clinic if you don't feel better in the next 2 days.       Know when to call 911. Emergency warning signs include:    Trouble breathing or shortness of breath Pain or pressure in the chest that doesn't go away Feeling confused like you haven't felt before, or not being able to wake up Bluish-colored lips or face.  Where can I get more information?    [employer] [facility] -- About COVID-19: [email or web address]   CDC -- What to Do If You're Sick: www.cdc.gov/coronavirus/2019-ncov/about/steps-when-sick.html   CDC -- Ending Home Isolation: www.cdc.gov/coronavirus/2019-ncov/hcp/disposition-in-home-patients.html   CDC -- Caring for Someone: www.cdc.gov/coronavirus/2019-ncov/if-you-are-sick/care-for-someone.html   [facility] -- Interim Guidance for Hospital  Discharge to Home: www.health.[facility].mn.us/diseases/coronavirus/hcp/hospdischarge.pdf   [facility] clinical trials (COVID-19 research studies): clinicalaffairs.[facility]-clinical-trials    Below are the COVID-19 hotlines at the [place] Department of Health ([facility]). Interpreters are available.    For health questions: Call 211-254-1023 or 1-979.785.2279 (7 a.m. to 7 p.m.) For questions about schools and childcare: Call 326-383-1295 or 1-919.504.6877 (7 a.m. to 7 p.m.)    COVID-19 (Coronavirus) General Information  Because there is currently no vaccine to prevent infection, the best way to protect yourself is to avoid being exposed to this virus. Common symptoms of COVID-19 include but are not limited to fever, cough, and shortness of breath. These symptoms appear 2-14 days after you are exposed to the virus that causes COVID-19. Click here for more information from the CDC on how to protect yourself.  If you are sick with COVID-19 or suspect you are infected with the virus that causes COVID-19, follow the steps here from the CDC to help prevent the disease from spreading to people in your home and community.  Click here for general information from the CDC on testing.  If you develop any of these emergency warning signs for COVID-19, get medical attention immediately:     Trouble breathing    Persistent pain or pressure in the chest    New confusion or inability to arouse    Bluish lips or face      Call your doctor or clinic before going in. Call [phone] if you have a medical emergency and notify the  you have or think you may have COVID-19.  For more detailed and up to date information on COVID-19 (Coronavirus), please visit the CDC website.   Diagnosis: Cough  Diagnosis ICD: R05

## 2020-09-21 ENCOUNTER — HOSPITAL ENCOUNTER (OUTPATIENT)
Dept: PHYSICAL THERAPY | Facility: OTHER | Age: 55
Setting detail: THERAPIES SERIES
End: 2020-09-21
Attending: OTOLARYNGOLOGY
Payer: COMMERCIAL

## 2020-09-21 ENCOUNTER — ALLIED HEALTH/NURSE VISIT (OUTPATIENT)
Dept: FAMILY MEDICINE | Facility: OTHER | Age: 55
End: 2020-09-21
Payer: COMMERCIAL

## 2020-09-21 DIAGNOSIS — Z20.822 COVID-19 RULED OUT: Primary | ICD-10-CM

## 2020-09-21 DIAGNOSIS — M26.609 TMJ (TEMPOROMANDIBULAR JOINT SYNDROME): ICD-10-CM

## 2020-09-21 PROCEDURE — 99207 ZZC NO CHARGE NURSE ONLY: CPT

## 2020-09-21 PROCEDURE — U0003 INFECTIOUS AGENT DETECTION BY NUCLEIC ACID (DNA OR RNA); SEVERE ACUTE RESPIRATORY SYNDROME CORONAVIRUS 2 (SARS-COV-2) (CORONAVIRUS DISEASE [COVID-19]), AMPLIFIED PROBE TECHNIQUE, MAKING USE OF HIGH THROUGHPUT TECHNOLOGIES AS DESCRIBED BY CMS-2020-01-R: HCPCS | Mod: ZL

## 2020-09-21 PROCEDURE — 97161 PT EVAL LOW COMPLEX 20 MIN: CPT | Mod: GP | Performed by: PHYSICAL THERAPIST

## 2020-09-21 PROCEDURE — 97110 THERAPEUTIC EXERCISES: CPT | Mod: GP | Performed by: PHYSICAL THERAPIST

## 2020-09-21 PROCEDURE — C9803 HOPD COVID-19 SPEC COLLECT: HCPCS

## 2020-09-21 NOTE — PROGRESS NOTES
"   09/21/20 0800   General Information   Type of Visit Initial OP Ortho PT Evaluation   Start of Care Date 09/21/20   Referring Physician Dr. Starr Harrison   Orders Evaluate and Treat   Date of Order 08/06/20   Certification Required? Yes   Medical Diagnosis TMJ   Precautions/Limitations no known precautions/limitations   Body Part(s)   Body Part(s) TMJ   Presentation and Etiology   Pertinent history of current problem (include personal factors and/or comorbidities that impact the POC) Patient reports left TMJ pain for \"more than 20 years\".  Worse lately due to increased dental issues.  Ill-fitting dentures and a tooth infection, and an ear infection.  Infections have cleared, and working on getting implants in place of dentures over the winter if possible.  Feels she gets headaches across forehead that feels like external muscle pain, but not internal head pain.  Takes ibuprofen for HA pain, usually once every couple of days.  Able to eat all foods, but has to position food and that seems to aggravate things and feels teeth no longer line up.  Had an underbite growing up which required 8 years of orthodontic work.  Denies locking of jaw, but does pop frequently then will feel stiff.  Also notes onset of a voice \"tick\", that she was told is possibly related to her muscles not being able to relax.  Reports she clenches teeth also, since she was young.  Thinks this is mostly a daytime habit.     How/Where did it occur With repetition/overuse   Onset date of current episode/exacerbation 08/06/20   Chronicity Chronic   Pain rating (0-10 point scale) Best (/10);Worst (/10)   Best (/10) 0/10   Worst (/10) 1/10   Pain/symptoms exacerbated by E. Rest;G. Certain positions  (sleeping)   Pain/symptoms eased by I. OTC medication(s)  (a self-traction maneuver to jaw)   Progression of symptoms since onset: Unchanged   Current Level of Function   Patient role/employment history F. Retired   Fall Risk Screen   Fall screen " completed by PT   Have you fallen 2 or more times in the past year? No   Have you fallen and had an injury in the past year? No   Is patient a fall risk? No   TMJ Objective Findings   Observation slightly longer jaw line right compared to left; Jaw is just slightly deviated to the right when looing at teeth;    Posture forward head and shoulders, equal bilaterally;    Joint noises Yes without pain   Joint lock Does not lock   Headache Cervical or muscle contraction   Habits Clenching   Tongue Scalloping No   Intraoral mouth appliance No   Bite/Occlusion Bite feels off   Opening click Yes   Closing click Yes   Crepitus Yes   Passive testing - Distraction Normal   Lateral Collateral Ligament no pain with lateral pressure   TMJ ROM Mandible Opening;Left Laterotrusion;Right Laterotrusion   Palpation   (L SCM and bilateral temporal and upper trap mm tension; )   Bite comment denture not fitting well   Opening click comment bilaterally, right worse than left   Closing click comment less intense than opening click   Crepitus comment no pain reported   Mandible Opening 56 mm   Left Laterotrusion 10 mm   Right Laterotrusion 12 mm   Planned Therapy Interventions   Planned Therapy Interventions manual therapy;strengthening;stretching   Planned Modality Interventions   Planned Modality Interventions Ultrasound;Cryotherapy;Hot packs   Clinical Impression   Criteria for Skilled Therapeutic Interventions Met yes, treatment indicated   PT Diagnosis impaired mobility; muscle dysfunction   Influenced by the following impairments muscle spasm; postural impairment; joint/mechanical dysfunction; malocclusion; Headaches   Functional limitations due to impairments HA due to jaw repositioning resulting in increased muscle tension causing difficulty with daily tasks.   Clinical Presentation Stable/Uncomplicated   Clinical Presentation Rationale clinical observation   Clinical Decision Making (Complexity) Low complexity   Therapy Frequency 2  times/Week   Predicted Duration of Therapy Intervention (days/wks) 8 weeks   Risk & Benefits of therapy have been explained Yes   Patient, Family & other staff in agreement with plan of care Yes   ORTHO GOALS   PT Ortho Eval Goals 1;2;3;4   Ortho Goal 1   Goal Identifier muscle tension   Goal Description Patient will have minimal to no muscle tension in B Upper traps and L SCM for improved postrual positioning and decreased HA frequency.   Target Date 10/19/20   Ortho Goal 2   Goal Identifier muscle tension   Goal Description Patient will have decreased temporal muscle tension for decreased HA frequency.   Target Date 10/19/20   Ortho Goal 3   Goal Identifier posture   Goal Description Patient will demonstrate improved head and trunk alignment for improved muscle balance of neck and jaw.   Target Date 11/16/20   Ortho Goal 4   Goal Identifier HA   Goal Description Patient will have decreased HA frequency to 1x/week max with activity for decreased disruption of household tasks.   Target Date 11/16/20   Total Evaluation Time   PT Mercedesal, Low Complexity Minutes (69000) 30   Therapy Certification   Certification date from 09/21/20   Certification date to 11/16/20

## 2020-09-21 NOTE — PROGRESS NOTES
[facility]          OUTPATIENT PHYSICAL THERAPY ORTHOPEDIC EVALUATION  PLAN OF TREATMENT FOR OUTPATIENT REHABILITATION  (COMPLETE FOR INITIAL CLAIMS ONLY)  Patient's Last Name, First Name, M.I.  YOB: 1965  Vikki Carrizales    Provider s Name:  [facility]   Medical Record No.  [id number]   Start of Care Date:  09/21/20   Onset Date:  08/06/20   Type:     _X__PT   ___OT   ___SLP Medical Diagnosis:   TMJ     PT Diagnosis:  impaired mobility; muscle dysfunction   Visits from SOC:  1      _________________________________________________________________________________  Plan of Treatment/Functional Goals:  manual therapy, strengthening, stretching     Ultrasound, Cryotherapy, Hot packs     Goals  Goal Identifier: muscle tension  Goal Description: Patient will have minimal to no muscle tension in B Upper traps and L SCM for improved postrual positioning and decreased HA frequency.  Target Date: 10/19/20    Goal Identifier: muscle tension  Goal Description: Patient will have decreased temporal muscle tension for decreased HA frequency.  Target Date: 10/19/20    Goal Identifier: posture  Goal Description: Patient will demonstrate improved head and trunk alignment for improved muscle balance of neck and jaw.  Target Date: 11/16/20    Goal Identifier: HA  Goal Description: Patient will have decreased HA frequency to 1x/week max with activity for decreased disruption of household tasks.  Target Date: 11/16/20          Therapy Frequency:  2 times/Week  Predicted Duration of Therapy Intervention:  8 weeks    Olive Urbina, PT                 I CERTIFY THE NEED FOR THESE SERVICES FURNISHED UNDER        THIS PLAN OF TREATMENT AND WHILE UNDER MY CARE     (Physician co-signature of this document indicates review and certification of the therapy plan).                       Certification Date From:  09/21/20   Certification Date To:  11/16/20    Referring Provider:   Dr. Starr Harrison    Initial Assessment        See Epic Evaluation Start of Care Date: 09/21/20

## 2020-09-23 LAB
SARS-COV-2 RNA SPEC QL NAA+PROBE: NOT DETECTED
SPECIMEN SOURCE: NORMAL

## 2020-09-28 ENCOUNTER — HOSPITAL ENCOUNTER (OUTPATIENT)
Dept: PHYSICAL THERAPY | Facility: OTHER | Age: 55
Setting detail: THERAPIES SERIES
End: 2020-09-28
Attending: OTOLARYNGOLOGY
Payer: COMMERCIAL

## 2020-09-28 ENCOUNTER — HOSPITAL ENCOUNTER (OUTPATIENT)
Dept: SPEECH THERAPY | Facility: OTHER | Age: 55
Setting detail: THERAPIES SERIES
End: 2020-09-28
Attending: OTOLARYNGOLOGY
Payer: COMMERCIAL

## 2020-09-28 PROCEDURE — 92524 BEHAVRAL QUALIT ANALYS VOICE: CPT | Mod: GN

## 2020-09-28 PROCEDURE — 97140 MANUAL THERAPY 1/> REGIONS: CPT | Mod: GP

## 2020-09-28 PROCEDURE — 97110 THERAPEUTIC EXERCISES: CPT | Mod: GP

## 2020-10-06 ENCOUNTER — OFFICE VISIT (OUTPATIENT)
Dept: OTOLARYNGOLOGY | Facility: OTHER | Age: 55
End: 2020-10-06
Attending: PHYSICIAN ASSISTANT
Payer: COMMERCIAL

## 2020-10-06 ENCOUNTER — HOSPITAL ENCOUNTER (OUTPATIENT)
Dept: PHYSICAL THERAPY | Facility: OTHER | Age: 55
Setting detail: THERAPIES SERIES
End: 2020-10-06
Attending: OTOLARYNGOLOGY
Payer: COMMERCIAL

## 2020-10-06 VITALS
TEMPERATURE: 97.2 F | SYSTOLIC BLOOD PRESSURE: 120 MMHG | HEART RATE: 75 BPM | DIASTOLIC BLOOD PRESSURE: 82 MMHG | WEIGHT: 170 LBS | OXYGEN SATURATION: 97 % | BODY MASS INDEX: 29.41 KG/M2

## 2020-10-06 DIAGNOSIS — J34.3 HYPERTROPHY OF INFERIOR NASAL TURBINATE: ICD-10-CM

## 2020-10-06 DIAGNOSIS — H92.01 RIGHT EAR PAIN: Primary | ICD-10-CM

## 2020-10-06 DIAGNOSIS — M26.609 TMJ (TEMPOROMANDIBULAR JOINT SYNDROME): ICD-10-CM

## 2020-10-06 DIAGNOSIS — J34.89 CONCHA BULLOSA: ICD-10-CM

## 2020-10-06 DIAGNOSIS — R49.0 MUSCLE TENSION DYSPHONIA: ICD-10-CM

## 2020-10-06 PROCEDURE — 92504 EAR MICROSCOPY EXAMINATION: CPT

## 2020-10-06 PROCEDURE — 31575 DIAGNOSTIC LARYNGOSCOPY: CPT | Performed by: PHYSICIAN ASSISTANT

## 2020-10-06 PROCEDURE — 97140 MANUAL THERAPY 1/> REGIONS: CPT | Mod: GP

## 2020-10-06 PROCEDURE — 99213 OFFICE O/P EST LOW 20 MIN: CPT | Performed by: PHYSICIAN ASSISTANT

## 2020-10-06 PROCEDURE — G0463 HOSPITAL OUTPT CLINIC VISIT: HCPCS | Mod: 25

## 2020-10-06 PROCEDURE — 92504 EAR MICROSCOPY EXAMINATION: CPT | Performed by: PHYSICIAN ASSISTANT

## 2020-10-06 PROCEDURE — 99213 OFFICE O/P EST LOW 20 MIN: CPT | Mod: 25 | Performed by: PHYSICIAN ASSISTANT

## 2020-10-06 RX ORDER — IBUPROFEN 400 MG/1
400 TABLET, FILM COATED ORAL EVERY 6 HOURS PRN
COMMUNITY

## 2020-10-06 ASSESSMENT — PAIN SCALES - GENERAL: PAINLEVEL: NO PAIN (0)

## 2020-10-06 NOTE — PATIENT INSTRUCTIONS
Continue with physical therapy for TMJ/ deep muscle.   Follow up with speech therapy as scheduled.   If no improvement with vocal changes- consider neurology referral.   Maintain good water intake. Avoid clenching.   Hearing protection.   Normal laryngeal exam today.    Follow up in 2 months.     Thank you for allowing Celena Patterson PA-C and our ENT team to participate in your care.  If your medications are too expensive, please give the nurse a call.  We can possibly change this medication.  If you have a scheduling or an appointment question please contact our Health Unit Coordinator at their direct line [phone].   ALL nursing questions or concerns can be directed to your ENT nurse at: [phone] Aishwarya

## 2020-10-06 NOTE — LETTER
10/6/2020         RE: Vikki Carrizales  [address]  [place] [address]        Dear Colleague,    Thank you for referring your patient, Vikki Carrizales, to the [facility]. Please see a copy of my visit note below.    Chief Complaint   Patient presents with     RECHECK     Pt is here for a f/u TMJ, right otalgia, muscle tension dysphonia, cocha bullosa and hypertrophy of inferior nasal turbinate.       Patient returns to ENT for recheck. She was recently seen on 8/6/20 by Dr. Harrison. She was noted to have continued right otalgia, TMJ and was referred to physical therapy.   She was instructed to return for scope after PT and ST.   She has been doing well. No otalgia at this time. She has felt her swelling and jaw positioning and has returned to normal range.   Vikki has no severe concerns of her jaw. No discomfort.   She is attending physical therapy for TMJ with good success.   Vikki has been clenching as much. She does have a denture and have not been using.     She has felt her vocal concerns occur less. She will follow with PT in relation to her deep muscle therapy. If no improvement, may consider neurologic--      Hx of  TMJ, she does note chronic bruxism and clenching  She wore braces as a child through her teenage years for described class II occlusion  She is also noticed about 4 to 6 months of an occasional vocal 'tic'  This does occur several times during the exam and sounds like her voice catches and she cannot vocalize.  No associated shortness of breath or wheezing  No weight loss no chronic pharyngitis no tobacco use no dysphasia     Audiogram todays date shows normal thresholds sloping to mild HFSNHL at 8 K  SRT 15 dB each ear, type A tymps each ear  CT soft tissue neck dated 5/8/2020 was reviewed and is negative the mastoids are well aerated bilaterally the ossicles are identified there is no middle ear mass no bony erosion..    No concerns with nasal  congestion.     Past Medical History:   Diagnosis Date     Anemia      Anxiety      Cutaneous abscess     2018     Gastro-esophageal reflux disease without esophagitis          Herpesviral infection of urogenital system     No Comments Provided     Impetigo     recurrent - as a child     Major depressive disorder, single episode     ,Rx: zoloft, citalopram and prozac in the past, none since )     Other seasonal allergic rhinitis     lifelong     Personal history of other medical treatment (CODE)      1 child  in childbirth (); one living child (1996)     Uncomplicated asthma          Vitamin D deficiency     No Comments Provided        Allergies   Allergen Reactions     Seasonal Allergies      seasonal     Current Outpatient Medications   Medication     acetaminophen (TYLENOL) 500 MG tablet     albuterol (PROAIR HFA/PROVENTIL HFA/VENTOLIN HFA) 108 (90 Base) MCG/ACT inhaler     amoxicillin (AMOXIL) 500 MG capsule     buPROPion (WELLBUTRIN XL) 150 MG 24 hr tablet     carBAMazepine (TEGRETOL XR) 200 MG 12 hr tablet     fluticasone (FLONASE) 50 MCG/ACT nasal spray     gabapentin (NEURONTIN) 300 MG capsule     HYDROcodone-acetaminophen (NORCO) 5-325 MG tablet     predniSONE (DELTASONE) 20 MG tablet     traZODone (DESYREL) 50 MG tablet     No current facility-administered medications for this visit.       ROS: 10 point ROS neg other than the symptoms noted above in the HPI.  /82   Pulse 75   Temp 97.2  F (36.2  C) (Tympanic)   Wt 77.1 kg (170 lb)   SpO2 97%   BMI 29.41 kg/m      General - The patient is well nourished and well developed, and appears to have good nutritional status.  Alert and oriented to person and place, answers questions and cooperates with examination appropriately.   Head and Face - Normocephalic and atraumatic, with no gross asymmetry noted.  The facial nerve is intact, with strong symmetric movements.  Voice and Breathing - The patient was breathing  comfortably without the use of accessory muscles. There was no wheezing, stridor, or stertor.  The patients voice was clear and strong, and had appropriate pitch and quality.  No shorty peripheral digital clubbing or cyanosis   Ears -examined under microscopy bilaterally  The external auditory canals are patent, the tympanic membranes are intact without effusion, retraction or mass.  Bony landmarks are intact.  TMJ with severe click and crepitus with opening bilaterally, masseters tender bilaterally worse right  Eyes - Extraocular movements intact, and the pupils were reactive to light.  Sclera were not icteric or injected, conjunctiva were pink and moist.  Mouth - Examination of the oral cavity showed pink, healthy oral mucosa. No lesions or ulcerations noted.  The tongue was mobile and midline, and the dentition were in fair condition.    Throat - The walls of the oropharynx were smooth, pink, moist, symmetric, and had no lesions or ulcerations.  The tonsillar pillars and soft palate were symmetric.  The uvula was midline on elevation.  Grade 2 symmetric tonsils  Neck - tight strap mm bilaterally, No palpable enlarged fixed cervical lymph nodes.  No neck cysts or unusual tenderness to palpation.   No palpable fixed thyroid nodules or concerning goiter.  The trachea is grossly midline.   Nose - External contour is symmetric, no gross deflection or scars.  Nasal mucosa is pink and moist with no abnormal mucus.  The septum and turbinates were evaluated: bilateral large nathaly bullosa, bilateral  inferior turbinate hypertrophy .  No polyps, masses, or purulence noted on examination.       Flexible Endoscopy -     Attempts at mirror laryngoscopy were not possible due to gag reflex.  Therefore I proceeded with a fiberoptic examination.  Patient used afrin prior to visit due to COVID.     I then passed the scope through the nasal cavity.    The nasal cavity was unremarkable.  The nasopharynx was mucosally covered and  symmetric.  The Eustachian tube openings were unobstructed.  Going further down I had a clear view of the base of tongue which had normal appearing lingual tonsillar tissue.  The base of tongue was free of lesions, and the vallecula was open.  The epiglottis was smooth and mucosally covered.  The supraglottic larynx was then clearly visualized.  The vocal cords moved smoothly and symmetrically, they were pearly white and no lesions were seen. There was a slight gap during phonation. The pyriform sinuses were open, and the limited view of the postcricoid region did not show any lesions.        ASSESSMENT:    ICD-10-CM    1. right referred otalgia, neurogenic  H92.01    2. TMJ (temporomandibular joint syndrome)  M26.609    3. Muscle tension dysphonia  R49.0    4. Nathaly bullosa  J34.89    5. Hypertrophy of inferior nasal turbinate  J34.3          Continue with TMJ PT, she is doing well and happy with this outcome.   Follow up wit , if no improvement or continued concerns. Consider neurology referral due to tic.   Maintain good water intake. Avoid clenching.   Reassured normal laryngeal examination today. Negative for mass or lesion.     If congestion worsens follow-up and surgery for endoscopic excision bilateral nathaly bullosa and bilateral submucous reduction inferior turbinates this was briefly discussed today.  She was reassured she does not have nasal polyps        Celena Patterson PA-C  ENT  [facility]  [phone]          Again, thank you for allowing me to participate in the care of your patient.        Sincerely,        Celena Patterson PA-C

## 2020-10-06 NOTE — NURSING NOTE
"Chief Complaint   Patient presents with     RECHECK     Pt is here for a f/u TMJ, right otalgia, muscle tension dysphonia, cocha bullosa and hypertrophy of inferior nasal turbinate.       Initial /82   Pulse 75   Temp 97.2  F (36.2  C) (Tympanic)   Wt 77.1 kg (170 lb)   SpO2 97%   BMI 29.41 kg/m   Estimated body mass index is 29.41 kg/m  as calculated from the following:    Height as of 8/6/20: 1.619 m (5' 3.75\").    Weight as of this encounter: 77.1 kg (170 lb).  Medication Reconciliation: complete  Candida Blancas LPN  "

## 2020-10-06 NOTE — PROGRESS NOTES
Chief Complaint   Patient presents with     RECHECK     Pt is here for a f/u TMJ, right otalgia, muscle tension dysphonia, cocha bullosa and hypertrophy of inferior nasal turbinate.       Patient returns to ENT for recheck. She was recently seen on 8/6/20 by Dr. Harrison. She was noted to have continued right otalgia, TMJ and was referred to physical therapy.   She was instructed to return for scope after PT and ST.   She has been doing well. No otalgia at this time. She has felt her swelling and jaw positioning and has returned to normal range.   Vikki has no severe concerns of her jaw. No discomfort.   She is attending physical therapy for TMJ with good success.   Vikki has been clenching as much. She does have a denture and have not been using.     She has felt her vocal concerns occur less. She will follow with PT in relation to her deep muscle therapy. If no improvement, may consider neurologic--      Hx of  TMJ, she does note chronic bruxism and clenching  She wore braces as a child through her teenage years for described class II occlusion  She is also noticed about 4 to 6 months of an occasional vocal 'tic'  This does occur several times during the exam and sounds like her voice catches and she cannot vocalize.  No associated shortness of breath or wheezing  No weight loss no chronic pharyngitis no tobacco use no dysphasia     Audiogram todays date shows normal thresholds sloping to mild HFSNHL at 8 K  SRT 15 dB each ear, type A tymps each ear  CT soft tissue neck dated 5/8/2020 was reviewed and is negative the mastoids are well aerated bilaterally the ossicles are identified there is no middle ear mass no bony erosion..    No concerns with nasal congestion.     Past Medical History:   Diagnosis Date     Anemia      Anxiety      Cutaneous abscess     1/16/2018     Gastro-esophageal reflux disease without esophagitis     2003     Herpesviral infection of urogenital system     No Comments Provided      Impetigo     recurrent - as a child     Major depressive disorder, single episode     ,Rx: zoloft, citalopram and prozac in the past, none since )     Other seasonal allergic rhinitis     lifelong     Personal history of other medical treatment (CODE)      1 child  in childbirth (); one living child (1996)     Uncomplicated asthma          Vitamin D deficiency     No Comments Provided        Allergies   Allergen Reactions     Seasonal Allergies      seasonal     Current Outpatient Medications   Medication     acetaminophen (TYLENOL) 500 MG tablet     albuterol (PROAIR HFA/PROVENTIL HFA/VENTOLIN HFA) 108 (90 Base) MCG/ACT inhaler     amoxicillin (AMOXIL) 500 MG capsule     buPROPion (WELLBUTRIN XL) 150 MG 24 hr tablet     carBAMazepine (TEGRETOL XR) 200 MG 12 hr tablet     fluticasone (FLONASE) 50 MCG/ACT nasal spray     gabapentin (NEURONTIN) 300 MG capsule     HYDROcodone-acetaminophen (NORCO) 5-325 MG tablet     predniSONE (DELTASONE) 20 MG tablet     traZODone (DESYREL) 50 MG tablet     No current facility-administered medications for this visit.       ROS: 10 point ROS neg other than the symptoms noted above in the HPI.  /82   Pulse 75   Temp 97.2  F (36.2  C) (Tympanic)   Wt 77.1 kg (170 lb)   SpO2 97%   BMI 29.41 kg/m      General - The patient is well nourished and well developed, and appears to have good nutritional status.  Alert and oriented to person and place, answers questions and cooperates with examination appropriately.   Head and Face - Normocephalic and atraumatic, with no gross asymmetry noted.  The facial nerve is intact, with strong symmetric movements.  Voice and Breathing - The patient was breathing comfortably without the use of accessory muscles. There was no wheezing, stridor, or stertor.  The patients voice was clear and strong, and had appropriate pitch and quality.  No shorty peripheral digital clubbing or cyanosis   Ears -examined under microscopy  bilaterally  The external auditory canals are patent, the tympanic membranes are intact without effusion, retraction or mass.  Bony landmarks are intact.  TMJ with severe click and crepitus with opening bilaterally, masseters tender bilaterally worse right  Eyes - Extraocular movements intact, and the pupils were reactive to light.  Sclera were not icteric or injected, conjunctiva were pink and moist.  Mouth - Examination of the oral cavity showed pink, healthy oral mucosa. No lesions or ulcerations noted.  The tongue was mobile and midline, and the dentition were in fair condition.    Throat - The walls of the oropharynx were smooth, pink, moist, symmetric, and had no lesions or ulcerations.  The tonsillar pillars and soft palate were symmetric.  The uvula was midline on elevation.  Grade 2 symmetric tonsils  Neck - tight strap mm bilaterally, No palpable enlarged fixed cervical lymph nodes.  No neck cysts or unusual tenderness to palpation.   No palpable fixed thyroid nodules or concerning goiter.  The trachea is grossly midline.   Nose - External contour is symmetric, no gross deflection or scars.  Nasal mucosa is pink and moist with no abnormal mucus.  The septum and turbinates were evaluated: bilateral large nathaly bullosa, bilateral  inferior turbinate hypertrophy .  No polyps, masses, or purulence noted on examination.       Flexible Endoscopy -     Attempts at mirror laryngoscopy were not possible due to gag reflex.  Therefore I proceeded with a fiberoptic examination.  Patient used afrin prior to visit due to COVID.     I then passed the scope through the nasal cavity.    The nasal cavity was unremarkable.  The nasopharynx was mucosally covered and symmetric.  The Eustachian tube openings were unobstructed.  Going further down I had a clear view of the base of tongue which had normal appearing lingual tonsillar tissue.  The base of tongue was free of lesions, and the vallecula was open.  The epiglottis was  smooth and mucosally covered.  The supraglottic larynx was then clearly visualized.  The vocal cords moved smoothly and symmetrically, they were pearly white and no lesions were seen. There was a slight gap during phonation. The pyriform sinuses were open, and the limited view of the postcricoid region did not show any lesions.        ASSESSMENT:    ICD-10-CM    1. right referred otalgia, neurogenic  H92.01    2. TMJ (temporomandibular joint syndrome)  M26.609    3. Muscle tension dysphonia  R49.0    4. Nathaly bullosa  J34.89    5. Hypertrophy of inferior nasal turbinate  J34.3          Continue with TMJ PT, she is doing well and happy with this outcome.   Follow up wit ST, if no improvement or continued concerns. Consider neurology referral due to tic.   Maintain good water intake. Avoid clenching.   Reassured normal laryngeal examination today. Negative for mass or lesion.     If congestion worsens follow-up and surgery for endoscopic excision bilateral nathaly bullosa and bilateral submucous reduction inferior turbinates this was briefly discussed today.  She was reassured she does not have nasal polyps        Celena Patterson PA-C  ENT  [facility] [place]  [phone]

## 2020-10-19 NOTE — PROGRESS NOTES
10/15/20 1400   General Information   Type of Evaluation Voice   Type Of Visit Initial   Start Of Care Date 09/28/20   Referring Physician Dr. Harrison   Orders Evaluate And Treat   Medical Diagnosis Muscle Tension Dysphonia   Onset Of Illness/injury Or Date Of Surgery   (about 4-6 months ago)   Surgical/Medical history reviewed Yes   Pertinent History Of Current Problem Pt presents with vocal tics. She informed therapist that she feels that she does not have control overthe vocalizaitions. She feels they happen multiple times a minute; however, no instances observed throughout the evaluation. No concerns for speech/voice. No breathing conerns; however, pt occasionally feel she cannot coordinate breath when she feels vocalization coming on or cannot stop reoccuring vocalization pattern.    Prior Level Of Function Comment Pt reports hx of various facial and vocal tics throughout her lifetime   General Observations Pt presents with above described deficits. She was not observed to have any uncontrolled vocalizations with the evaluation. No concerns for voice or speech production at this time. Pt provided strategies to work through episodes.    Patient/family Goals To decrease uncontrolled vocalizations   Fall Risk Screen   Fall screen completed by PT   Have you fallen 2 or more times in the past year? No   Have you fallen and had an injury in the past year? No   Is patient a fall risk? No   Speech   Speech Comments Per pt report, pt experiencing uncontrollable vocalizations which impact her ability to produce target words and sentences. She informed the therapist that it is similar to a glottal stop. This frequently impacts her ability to communicate thoguhts, wants, and needs.    General Therapy Interventions   Planned Therapy Interventions Voice   Voice Breath flow to sound flow  (Strategies to reduce vocalizations)   Clinical Impression, SLP Eval   Criteria for Skilled Therapeutic Interventions Met (SLP Eval)  yes   SLP Diagnosis Involuntary voicing   Functional limitations due to impairments Pt has difficulty partiicpating in conversations within the home due to social and physical impacts of difficulties noted above.   Rehab potential affected by Session attendence and completion of home-programming.    Therapy Frequency other (see comments)  (1-4x per month)   Predicted Duration of Therapy Intervention (days/wks) 60 days   Risks and Benefits of Treatment have been explained. Yes   Patient, Family & other staff in agreement with plan of care Yes   Clinical Impression Comments Pt will benefit from skilled intervention to target functional difficulties noted above.    Cognitive/Communication Goals   Cognitive/Communication Goals 1;2   Cognitive/Communication Goal 1   Goal Identifier Voice control   Goal Description Pt will independently implement voicing reduction strategies across 90% of opportunities across 2 sessions with the therapist.    Target Date 11/27/20   Cognitive/Communication Goal 2   Goal Identifier Strategy Log   Goal Description Pt will implement strategies within the home as characterized by vocalizaiton reduction log across 30 consecutive days.   Target Date 11/27/20   Total Session Time   Voice Minutes (45547) 20   Total Evaluation Time 20   Therapy Certification   Certification date from 09/28/20   Certification date to 11/27/20   Medical Diagnosis Dysphonia

## 2020-10-19 NOTE — PROGRESS NOTES
[facility]          OUTPATIENT SPEECH LANGUAGE PATHOLOGY LANGUAGE-COGNITION  EVALUATION  PLAN OF TREATMENT FOR OUTPATIENT REHABILITATION  (COMPLETE FOR INITIAL CLAIMS ONLY)  Patient's Last Name, First Name, M.I.  YOB: 1965  SofieVikki  TANA                        Provider s Name: [facility] Medical Record No.  [id number]     Onset Date:  (about 4-6 months ago)   Start of Care Date: 09/28/20   Type:     ___PT  __OT   _X_SLP    Medical Diagnosis:  (P) Dysphonia   Speech Language Pathology Diagnosis:  (P) Involuntary voicing    Visits from SOC: 1                                        ________________________________________________________________________________  Plan of Treatment/Functional Goals: Vocalization reduction strategies; breathing strategies   Planned Therapy Interventions: (P) Voice        Communication Goals   1. Goal Identifier: (P) Voice control       Goal Description: (P) Pt will independently implement voicing reduction strategies across 90% of opportunities across 2 sessions with the therapist.        Target Date: (P) 11/27/20   2. Goal Identifier: (P) Strategy Log       Goal Description: (P) Pt will implement strategies within the home as characterized by vocalizaiton reduction log across 30 consecutive days.       Target Date: (P) 11/27/20        Predicted Duration of Therapy Intervention (days/wks): 1-4x per month (P) 60 days    Elidia Hernandez, SLP       I CERTIFY THE NEED FOR THESE SERVICES FURNISHED UNDER        THIS PLAN OF TREATMENT AND WHILE UNDER MY CARE     (Physician co-signature of this document indicates review and certification of the therapy plan).                  Certification Date From:  (P) 09/28/20  Certification Date To:   (P) 11/27/20          Referring Physician:  Dr. Harrison    Initial Assessment        See Epic Evaluation Start Of Care  Date: 09/28/20

## 2020-10-20 ENCOUNTER — OFFICE VISIT (OUTPATIENT)
Dept: FAMILY MEDICINE | Facility: OTHER | Age: 55
End: 2020-10-20
Attending: NURSE PRACTITIONER
Payer: COMMERCIAL

## 2020-10-20 VITALS
WEIGHT: 174.8 LBS | HEART RATE: 76 BPM | HEIGHT: 64 IN | SYSTOLIC BLOOD PRESSURE: 118 MMHG | RESPIRATION RATE: 18 BRPM | BODY MASS INDEX: 29.84 KG/M2 | OXYGEN SATURATION: 98 % | TEMPERATURE: 98.7 F | DIASTOLIC BLOOD PRESSURE: 80 MMHG

## 2020-10-20 DIAGNOSIS — R35.0 URINARY FREQUENCY: ICD-10-CM

## 2020-10-20 DIAGNOSIS — N10 ACUTE PYELONEPHRITIS: Primary | ICD-10-CM

## 2020-10-20 DIAGNOSIS — J45.909 UNCOMPLICATED ASTHMA, UNSPECIFIED ASTHMA SEVERITY, UNSPECIFIED WHETHER PERSISTENT: Primary | ICD-10-CM

## 2020-10-20 LAB
ALBUMIN UR-MCNC: 30 MG/DL
APPEARANCE UR: ABNORMAL
BACTERIA #/AREA URNS HPF: ABNORMAL /HPF
BILIRUB UR QL STRIP: NEGATIVE
COLOR UR AUTO: YELLOW
GLUCOSE UR STRIP-MCNC: NEGATIVE MG/DL
HGB UR QL STRIP: ABNORMAL
KETONES UR STRIP-MCNC: NEGATIVE MG/DL
LEUKOCYTE ESTERASE UR QL STRIP: ABNORMAL
NITRATE UR QL: POSITIVE
PH UR STRIP: 6 PH (ref 5–7)
RBC #/AREA URNS AUTO: 15 /HPF (ref 0–2)
SOURCE: ABNORMAL
SP GR UR STRIP: 1.01 (ref 1–1.03)
UROBILINOGEN UR STRIP-MCNC: NORMAL MG/DL (ref 0–2)
WBC #/AREA URNS AUTO: >182 /HPF (ref 0–5)
WBC CLUMPS #/AREA URNS HPF: PRESENT /HPF

## 2020-10-20 PROCEDURE — G0463 HOSPITAL OUTPT CLINIC VISIT: HCPCS

## 2020-10-20 PROCEDURE — 81001 URINALYSIS AUTO W/SCOPE: CPT | Mod: ZL | Performed by: NURSE PRACTITIONER

## 2020-10-20 PROCEDURE — 99214 OFFICE O/P EST MOD 30 MIN: CPT | Performed by: NURSE PRACTITIONER

## 2020-10-20 PROCEDURE — 87088 URINE BACTERIA CULTURE: CPT | Mod: ZL | Performed by: NURSE PRACTITIONER

## 2020-10-20 PROCEDURE — 87086 URINE CULTURE/COLONY COUNT: CPT | Mod: ZL | Performed by: NURSE PRACTITIONER

## 2020-10-20 RX ORDER — ALBUTEROL SULFATE 90 UG/1
AEROSOL, METERED RESPIRATORY (INHALATION)
Qty: 54 G | Refills: 0 | Status: SHIPPED | OUTPATIENT
Start: 2020-10-20 | End: 2020-12-30

## 2020-10-20 RX ORDER — SULFAMETHOXAZOLE/TRIMETHOPRIM 800-160 MG
1 TABLET ORAL 2 TIMES DAILY
Qty: 14 TABLET | Refills: 0 | Status: SHIPPED | OUTPATIENT
Start: 2020-10-20 | End: 2020-10-27

## 2020-10-20 ASSESSMENT — MIFFLIN-ST. JEOR: SCORE: 1368.92

## 2020-10-20 ASSESSMENT — PAIN SCALES - GENERAL: PAINLEVEL: NO PAIN (0)

## 2020-10-20 NOTE — PATIENT INSTRUCTIONS
Start antibiotics  Increase fluids, especially water    Urine culture pending which takes about 2 days - will call only if need to change antibiotics    Follow up if worsening or concerns

## 2020-10-20 NOTE — TELEPHONE ENCOUNTER
[facility] sent Rx request for the following:      Requested Prescriptions   Pending Prescriptions Disp Refills   albuterol (PROAIR HFA/PROVENTIL HFA/VENTOLIN HFA) 108 (90 Base) MCG/ACT inhaler [Pharmacy Med Name: ALBUTEROL HFA INH(200 PUFFS)18GM] 54 g 1    Sig: INHALE 2 PUFFS INTO THE LUNGS EVERY 6 HOURS AS NEEDED FOR SHORTNESS OF BREATH OR DIFFICULT BREATHING OR WHEEZING   Last Prescription Date:   5/20/20  Last Fill Qty/Refills:         54G, R-1    Last Office Visit:              5/5/20  Future Office visit:           None   Asthma Maintenance Inhalers - Anticholinergics Failed - 10/20/2020 10:24 AM       Failed - Asthma control assessment score within normal limits in last 6 months      Short-Acting Beta Agonist Inhalers Protocol  Failed - 10/20/2020 10:24 AM       Failed - Asthma control assessment score within normal limits in last 6 months     Patient will be due for 6-month asthma check-up, around 11/5/20. Reminder letter sent to Pt. Prescription approved per [facility] Refill Protocol for 90-day reuben refill. Mirna Keyes RN .............. 10/20/2020  10:29 AM

## 2020-10-20 NOTE — LETTER
October 20, 2020      Vikki Carrizales  [address] [address]        Dear Vikki,     This letter is to remind you that you are due for your 6-month asthma check-up with Dr. Calderon. Your last comprehensive visit was more than 12 months ago.    A LIMITED refill of albuterol inhaler has been called into your pharmacy. Additional refills require you to complete this appointment.    Please call the clinic at [phone] to schedule your appointment.    If you should require additional refills before your scheduled appointment, please contact your pharmacy and we will refill your medication until the date of your appointment.    Thank you for choosing [facility] and [facility] for your health care needs.    Sincerely,    Refill RN  [facility]

## 2020-10-20 NOTE — PROGRESS NOTES
HPI:    Vikki Carrizales is a 55 year old female  who presents to [facility] today for UTI.    Symptoms for the past 4 days.  Lots of suprapubic pressure, frequency, and urgency.  No dysuria.  No noted blood in urine but states scant pink on toilet paper last night with wiping.  No fevers or chills.  No nausea or vomiting.  Appetite decreased over the past 3 weeks.  New onset constipation for the past few days, still having bowel movements but slower and harder.  Lower left back pain started today, just below ribs, not sharp or stabbing.  No abdominal pain or cramping, just pressure.  No vaginal discharge.  Recently noted vaginal itching with wiping.    States hx of UTIs years ago but none recently.  No hx of kidney stones.  No hx of colitis.    Took Senokot last night.  Tried Urostat a few days ago.        Past Medical History:   Diagnosis Date     Anemia      Anxiety      Cutaneous abscess     2018     Gastro-esophageal reflux disease without esophagitis          Herpesviral infection of urogenital system     No Comments Provided     Impetigo     recurrent - as a child     Major depressive disorder, single episode     ,Rx: zoloft, citalopram and prozac in the past, none since )     Other seasonal allergic rhinitis     lifelong     Personal history of other medical treatment (CODE)      1 child  in childbirth (); one living child (1996)     Uncomplicated asthma          Vitamin D deficiency     No Comments Provided     Past Surgical History:   Procedure Laterality Date      SECTION      ,,HELLP syndrome, Platelet transfusions; son       SECTION           COLONOSCOPY  2019    Normal exam, 5 year follow up per Dr. Yadav     COLONOSCOPY N/A 2019    Procedure: COLONOSCOPY;  Surgeon: Miguel A Yadav MD;  Location:  OR     ESOPHAGOSCOPY, GASTROSCOPY, DUODENOSCOPY (EGD), COMBINED N/A 2019    Procedure: ESOPHAGOGASTRODUODENOSCOPY  "(EGD);  Surgeon: Miguel A Yadav MD;  Location: [facility] OR     GASTRIC BYPASS  2002     LAPAROSCOPIC BYPASS GASTRIC      2002, [place] en Y Gastric Bypass, Dr Gabriel Torres, [facility]     MOUTH SURGERY       Social History     Tobacco Use     Smoking status: Never Smoker     Smokeless tobacco: Never Used   Substance Use Topics     Alcohol use: Yes     Comment: moderate, about 1 bottle of wine per week     Current Outpatient Medications   Medication Sig Dispense Refill     acetaminophen (TYLENOL) 500 MG tablet Take 500-1,000 mg by mouth every 6 hours as needed for mild pain       albuterol (PROAIR HFA/PROVENTIL HFA/VENTOLIN HFA) 108 (90 Base) MCG/ACT inhaler INHALE 2 PUFFS INTO THE LUNGS EVERY 6 HOURS AS NEEDED FOR SHORTNESS OF BREATH OR DIFFICULT BREATHING OR WHEEZING 54 g 0     buPROPion (WELLBUTRIN XL) 150 MG 24 hr tablet TAKE 2 TABLETS(300 MG) BY MOUTH EVERY MORNING 180 tablet 1     fluticasone (FLONASE) 50 MCG/ACT nasal spray Spray 2 sprays in nostril daily 18.2 mL 11     ibuprofen (ADVIL/MOTRIN) 400 MG tablet Take 400 mg by mouth every 6 hours as needed for moderate pain       traZODone (DESYREL) 50 MG tablet Take 0.5-1 tablets (25-50 mg) by mouth At Bedtime 30 tablet 11     Allergies   Allergen Reactions     Seasonal Allergies      seasonal         Past medical history, past surgical history, current medications and allergies reviewed and accurate to the best of my knowledge.        ROS:  Refer to HPI    /80   Pulse 76   Temp 98.7  F (37.1  C)   Resp 18   Ht 1.619 m (5' 3.75\")   Wt 79.3 kg (174 lb 12.8 oz)   SpO2 98%   BMI 30.24 kg/m      EXAM:  General Appearance: Well appearing adult female, non ill appearance, appropriate appearance for age. No acute distress  Respiratory: normal chest wall and respirations.  Normal effort.  Clear to auscultation bilaterally, no wheezing, crackles or rhonchi.  No increased work of breathing.  No cough appreciated.  Cardiac: RRR with no " murmurs  Abdomen: soft, nontender upper abdomen, nontender right lower abdomen, mild tenderness of the left lower abdomen, no rigidity, no rebound tenderness or guarding, normal bowel sounds present  : Bilateral suprapubic tenderness to palpation.  Left CVA/flank tenderness to palpation.  Nontender right CVA/flank to palpation.  Musculoskeletal:  Equal movement of bilateral upper extremities.  Equal movement of bilateral lower extremities.  Normal gait.   Psychological: normal affect, alert, oriented, and pleasant.       Labs:  Results for orders placed or performed in visit on 10/20/20   UA reflex to Microscopic and Culture     Status: Abnormal    Specimen: Midstream Urine   Result Value Ref Range    Color Urine Yellow     Appearance Urine Slightly Cloudy     Glucose Urine Negative NEG^Negative mg/dL    Bilirubin Urine Negative NEG^Negative    Ketones Urine Negative NEG^Negative mg/dL    Specific Gravity Urine 1.010 1.003 - 1.035    Blood Urine Moderate (A) NEG^Negative    pH Urine 6.0 5.0 - 7.0 pH    Protein Albumin Urine 30 (A) NEG^Negative mg/dL    Urobilinogen mg/dL Normal 0.0 - 2.0 mg/dL    Nitrite Urine Positive (A) NEG^Negative    Leukocyte Esterase Urine Large (A) NEG^Negative    Source Midstream Urine     RBC Urine 15 (H) 0 - 2 /HPF    WBC Urine >182 (H) 0 - 5 /HPF    WBC Clumps Present (A) NEG^Negative /HPF    Bacteria Urine Few (A) NEG^Negative /HPF                 ASSESSMENT/PLAN:  1. Urinary frequency    - UA reflex to Microscopic and Culture  - Urine Culture Aerobic Bacterial    2. Acute pyelonephritis    - sulfamethoxazole-trimethoprim (BACTRIM DS) 800-160 MG tablet; Take 1 tablet by mouth 2 times daily for 7 days  Dispense: 14 tablet; Refill: 0      Urinalysis - cloudy, moderate blood, positive nitrite, large leukocyte estrace  Urine microscopic - RBC 15, WBC >182 with clumps, bacteria few    Urine culture pending  Will call if culture warrants change of abx.     May use Pyridium OTC PRN.   May  use over-the-counter Tylenol or ibuprofen PRN  Encouraged fluids and frequent bladder emptying.    Discussed warning signs/symptoms indicative of need to f/u including but not limited to: fever, worsening pain, vomiting, etc  Follow up if symptoms persist or worsen or concerns      I explained my diagnostic considerations and recommendations to the patient, who voiced understanding and agreement with the treatment plan. All questions were answered. We discussed potential side effects of any prescribed or recommended therapies, as well as expectations for response to treatments.    Disclaimer:  This note consists of words and symbols derived from keyboarding, dictation, or using voice recognition software. As a result, there may be errors in the script that have gone undetected. Please consider this when interpreting information found in this note.

## 2020-10-20 NOTE — NURSING NOTE
Patient presents today for urinary issue. Patient complains of urinary frequency and urgency.     Medication Reconciliation Complete    Lakisha Rodriguez LPN  10/20/2020 2:27 PM

## 2020-10-22 LAB
BACTERIA SPEC CULT: ABNORMAL
SPECIMEN SOURCE: ABNORMAL

## 2020-11-12 ENCOUNTER — OFFICE VISIT (OUTPATIENT)
Dept: FAMILY MEDICINE | Facility: OTHER | Age: 55
End: 2020-11-12
Attending: NURSE PRACTITIONER
Payer: COMMERCIAL

## 2020-11-12 VITALS
OXYGEN SATURATION: 99 % | SYSTOLIC BLOOD PRESSURE: 124 MMHG | BODY MASS INDEX: 30.57 KG/M2 | WEIGHT: 176.7 LBS | RESPIRATION RATE: 18 BRPM | TEMPERATURE: 96.5 F | HEART RATE: 83 BPM | DIASTOLIC BLOOD PRESSURE: 66 MMHG

## 2020-11-12 DIAGNOSIS — N30.00 ACUTE CYSTITIS WITHOUT HEMATURIA: Primary | ICD-10-CM

## 2020-11-12 DIAGNOSIS — B37.31 YEAST INFECTION OF THE VAGINA: ICD-10-CM

## 2020-11-12 LAB
ALBUMIN UR-MCNC: 30 MG/DL
APPEARANCE UR: ABNORMAL
BACTERIA #/AREA URNS HPF: ABNORMAL /HPF
BILIRUB UR QL STRIP: NEGATIVE
COLOR UR AUTO: YELLOW
GLUCOSE UR STRIP-MCNC: NEGATIVE MG/DL
HGB UR QL STRIP: ABNORMAL
KETONES UR STRIP-MCNC: NEGATIVE MG/DL
LEUKOCYTE ESTERASE UR QL STRIP: ABNORMAL
NITRATE UR QL: NEGATIVE
PH UR STRIP: 6.5 PH (ref 5–7)
RBC #/AREA URNS AUTO: 62 /HPF (ref 0–2)
SOURCE: ABNORMAL
SP GR UR STRIP: 1.02 (ref 1–1.03)
UROBILINOGEN UR STRIP-MCNC: NORMAL MG/DL (ref 0–2)
WBC #/AREA URNS AUTO: >182 /HPF (ref 0–5)
WBC CLUMPS #/AREA URNS HPF: PRESENT /HPF
YEAST #/AREA URNS HPF: ABNORMAL /HPF

## 2020-11-12 PROCEDURE — 81001 URINALYSIS AUTO W/SCOPE: CPT | Mod: ZL | Performed by: NURSE PRACTITIONER

## 2020-11-12 PROCEDURE — 87088 URINE BACTERIA CULTURE: CPT | Mod: ZL | Performed by: NURSE PRACTITIONER

## 2020-11-12 PROCEDURE — 87086 URINE CULTURE/COLONY COUNT: CPT | Mod: ZL | Performed by: NURSE PRACTITIONER

## 2020-11-12 PROCEDURE — 99214 OFFICE O/P EST MOD 30 MIN: CPT | Performed by: NURSE PRACTITIONER

## 2020-11-12 PROCEDURE — G0463 HOSPITAL OUTPT CLINIC VISIT: HCPCS

## 2020-11-12 RX ORDER — FLUCONAZOLE 150 MG/1
150 TABLET ORAL
Qty: 3 TABLET | Refills: 0 | Status: SHIPPED | OUTPATIENT
Start: 2020-11-12 | End: 2020-11-19

## 2020-11-12 RX ORDER — NITROFURANTOIN 25; 75 MG/1; MG/1
100 CAPSULE ORAL 2 TIMES DAILY
Qty: 10 CAPSULE | Refills: 0 | Status: SHIPPED | OUTPATIENT
Start: 2020-11-12 | End: 2020-11-17

## 2020-11-12 ASSESSMENT — PAIN SCALES - GENERAL: PAINLEVEL: NO PAIN (0)

## 2020-11-12 NOTE — NURSING NOTE
"Chief Complaint   Patient presents with     Urinary Problem     Patient is here for urgency and having a hard time urinating that started yesterday morning.     Initial /66   Pulse 83   Temp 96.5  F (35.8  C) (Tympanic)   Resp 18   Wt 80.2 kg (176 lb 11.2 oz)   SpO2 99%   BMI 30.57 kg/m   Estimated body mass index is 30.57 kg/m  as calculated from the following:    Height as of 10/20/20: 1.619 m (5' 3.75\").    Weight as of this encounter: 80.2 kg (176 lb 11.2 oz).  Medication Reconciliation: complete    Debi Gates LPN  "

## 2020-11-12 NOTE — PROGRESS NOTES
HPI:    Vikki Carrizales is a 55 year old female  who presents to [facility] today for UTI symptoms. The patient reports having  urgency, frequency and feels like she can't completely empty her bladder. Patient states that her symptoms began yesterday.  The patient reports cloudiness to her urine. Denies hematuria. Denies acute back pain. Denies vaginal itching or discharge. The patient [facility].       Past Medical History:   Diagnosis Date     Anemia      Anxiety      Cutaneous abscess     2018     Gastro-esophageal reflux disease without esophagitis          Herpesviral infection of urogenital system     No Comments Provided     Impetigo     recurrent - as a child     Major depressive disorder, single episode     ,Rx: zoloft, citalopram and prozac in the past, none since )     Other seasonal allergic rhinitis     lifelong     Personal history of other medical treatment (CODE)      1 child  in childbirth (); one living child (b )     Uncomplicated asthma          Vitamin D deficiency     No Comments Provided     Past Surgical History:   Procedure Laterality Date      SECTION      ,,HELLP syndrome, Platelet transfusions; son       SECTION           COLONOSCOPY  2019    Normal exam, 5 year follow up per Dr. Yadav     COLONOSCOPY N/A 2019    Procedure: COLONOSCOPY;  Surgeon: Miguel A Yadav MD;  Location:  OR     ESOPHAGOSCOPY, GASTROSCOPY, DUODENOSCOPY (EGD), COMBINED N/A 2019    Procedure: ESOPHAGOGASTRODUODENOSCOPY (EGD);  Surgeon: Miguel A Yadav MD;  Location: [facility] OR     GASTRIC BYPASS       LAPAROSCOPIC BYPASS GASTRIC      , [place] en [place] Gastric Bypass, Dr Gabriel Torres, [facility] SURGERY       Social History     Tobacco Use     Smoking status: Never Smoker     Smokeless tobacco: Never Used   Substance Use Topics     Alcohol  use: Yes     Comment: moderate, about 1 bottle of wine per week     Current Outpatient Medications   Medication Sig Dispense Refill     acetaminophen (TYLENOL) 500 MG tablet Take 500-1,000 mg by mouth every 6 hours as needed for mild pain       albuterol (PROAIR HFA/PROVENTIL HFA/VENTOLIN HFA) 108 (90 Base) MCG/ACT inhaler INHALE 2 PUFFS INTO THE LUNGS EVERY 6 HOURS AS NEEDED FOR SHORTNESS OF BREATH OR DIFFICULT BREATHING OR WHEEZING 54 g 0     buPROPion (WELLBUTRIN XL) 150 MG 24 hr tablet TAKE 2 TABLETS(300 MG) BY MOUTH EVERY MORNING 180 tablet 1     fluticasone (FLONASE) 50 MCG/ACT nasal spray Spray 2 sprays in nostril daily 18.2 mL 11     ibuprofen (ADVIL/MOTRIN) 400 MG tablet Take 400 mg by mouth every 6 hours as needed for moderate pain       traZODone (DESYREL) 50 MG tablet Take 0.5-1 tablets (25-50 mg) by mouth At Bedtime 30 tablet 11     Allergies   Allergen Reactions     Seasonal Allergies      seasonal         Past medical history, past surgical history, current medications and allergies reviewed and accurate to the best of my knowledge.        ROS:  Refer to HPI    /66   Pulse 83   Temp 96.5  F (35.8  C) (Tympanic)   Resp 18   Wt 80.2 kg (176 lb 11.2 oz)   SpO2 99%   BMI 30.57 kg/m      EXAM:  General Appearance: Well appearing female, appropriate appearance for age. No acute distress  Respiratory: normal chest wall and respirations.  Normal effort.  Clear to auscultation bilaterally, no wheezing, crackles or rhonchi.  No increased work of breathing.  No cough appreciated.  Cardiac: RRR with no murmurs  Abdomen: soft, nontender, no rigidity, no rebound tenderness or guarding, normal bowel sounds present  :  No suprapubic tenderness to palpation.  No CVA tenderness to palpation.      Dermatological: no rashes noted of exposed skin  Psychological: normal affect, alert, oriented, and pleasant.       Labs:  .  Results for orders placed or performed in visit on 11/12/20   UA reflex to Microscopic  and Culture     Status: Abnormal    Specimen: Midstream Urine   Result Value Ref Range    Color Urine Yellow     Appearance Urine Cloudy     Glucose Urine Negative NEG^Negative mg/dL    Bilirubin Urine Negative NEG^Negative    Ketones Urine Negative NEG^Negative mg/dL    Specific Gravity Urine 1.018 1.003 - 1.035    Blood Urine Moderate (A) NEG^Negative    pH Urine 6.5 5.0 - 7.0 pH    Protein Albumin Urine 30 (A) NEG^Negative mg/dL    Urobilinogen mg/dL Normal 0.0 - 2.0 mg/dL    Nitrite Urine Negative NEG^Negative    Leukocyte Esterase Urine Large (A) NEG^Negative    Source Midstream Urine     RBC Urine 62 (H) 0 - 2 /HPF    WBC Urine >182 (H) 0 - 5 /HPF    WBC Clumps Present (A) NEG^Negative /HPF    Bacteria Urine Few (A) NEG^Negative /HPF    Yeast Urine Many (A) NEG^Negative /HPF               ASSESSMENT/PLAN:  1. Acute cystitis without hematuria    - nitroFURantoin macrocrystal-monohydrate (MACROBID) 100 MG capsule; Take 1 capsule (100 mg) by mouth 2 times daily for 5 days  Dispense: 10 capsule; Refill: 0    2. Yeast infection of the vagina  - fluconazole (DIFLUCAN) 150 MG tablet; Take 1 tablet (150 mg) by mouth every 72 hours for 3 doses  Dispense: 3 tablet; Refill: 0      Discussed UA results with the patient and informed her that she appears to have both a UTI and yeast infection.     Instructed the patient to begin taking diflucan for the yeast infection and macrobid for the UTI.     Pending urine culture and sensitivity results.     Informed the patient that depending on the culture and sensitivity her current course of antibiotic may need to be changed.     Symptomatic treatment - Encouraged fluids    May use over-the-counter Tylenol or ibuprofen PRN    Discussed warning signs/symptoms indicative of need to f/u    Follow up if symptoms persist or worsen or concerns      I explained my diagnostic considerations and recommendations to the patient, who voiced understanding and agreement with the treatment plan.  All questions were answered. We discussed potential side effects of any prescribed or recommended therapies, as well as expectations for response to treatments.    Disclaimer:  This note consists of words and symbols derived from keyboarding, dictation, or using voice recognition software. As a result, there may be errors in the script that have gone undetected. Please consider this when interpreting information found in this note.

## 2020-11-12 NOTE — PATIENT INSTRUCTIONS
Patient Education     Yeast Infection (Candida Vaginal Infection)    You have a Candida vaginal infection. This is also known as a yeast infection. It is most often caused by a type of yeast (fungus) called Candida. Candida are normally found in the vagina. But if they increase in number, this can lead to infection and cause symptoms.  Symptoms of a yeast infection can include:    Clumpy or thin, white discharge, which may look like cottage cheese    Itching or burning    Burning with urination  Certain factors can make a yeast infection more likely. These can include:    Taking certain medicines, such as antibiotics or birth control pills    Pregnancy    Diabetes    Weak immune system  A yeast infection is most often treated with antifungal medicine. This may be given as a vaginal cream or pills you take by mouth. Treatment may last for about 1 to 7 days. Women with severe or recurrent infections may need longer courses of treatment.  Home care    If you re prescribed medicine, be sure to use it as directed. Finish all of the medicine, even if your symptoms go away. Note: Don t try to treat yourself using over-the-counter products without talking to your provider first. He or she will let you know if this is a good option for you.    Ask your provider what steps you can take to help reduce your risk of having a yeast infection in the future.    Follow-up care  Follow up with your healthcare provider, or as directed.  When to seek medical advice  Call your healthcare provider right away if:    You have a fever of 100.4 F (38 C) or higher, or as directed by your provider.    Your symptoms worsen, or they don t go away within a few days of starting treatment.    You have new pain in the lower belly or pelvic region.    You have side effects that bother you or a reaction to the cream or pills you re prescribed.    You or any partners you have sex with have new symptoms, such as a rash, joint pain, or sores.  Lorri  last reviewed this educational content on 10/1/2017    6259-0821 [employer]. [address] [place] [address]. All rights reserved. This information is not intended as a substitute for professional medical care. Always follow your healthcare professional's instructions.           Patient Education     Understanding Urinary Tract Infections (UTIs)   Most UTIs are caused by bacteria, but they may also be caused by viruses or fungi. Bacteria from the bowel are the most common source of infection. The infection may start because of any of the following:     Sexual activity.  During sex, bacteria can travel from the penis, vagina, or rectum into the urethra.     Bacteria outside the rectum getting into the urethra.  Bacteria on the skin outside the rectum may travel into the urethra. This is more common in women since the rectum and urethra are closer to each other than in men. Wiping from front to back after using the toilet and keeping the area clean can help prevent germs from getting to the urethra.    Blocked urine flow through the urinary tract. If urine sits too long, germs may start to grow out of control.      Parts of the urinary tract  The infection can occur in any part of the urinary tract.     The kidneys.  These organs collect and store urine.    The ureters. These tubes carry urine from the kidneys to the bladder.    The bladder.  This holds urine until you are ready to let it out.    The urethra. This tube carries urine from the bladder out of the body. It is shorter in women, so bacteria can move through it more easily. The urethra is longer in men, so a UTI is less likely to reach the bladder or kidneys in men.  Ayannah last reviewed this educational content on 1/1/2020 2000-2020 [employer] [address] [place] [address]. All rights reserved. This information is not intended as a substitute for professional medical care. Always follow your  healthcare professional's instructions.

## 2020-11-14 LAB
BACTERIA SPEC CULT: ABNORMAL
SPECIMEN SOURCE: ABNORMAL

## 2020-12-21 ENCOUNTER — MYC MEDICAL ADVICE (OUTPATIENT)
Dept: FAMILY MEDICINE | Facility: OTHER | Age: 55
End: 2020-12-21

## 2020-12-22 ENCOUNTER — MYC MEDICAL ADVICE (OUTPATIENT)
Dept: FAMILY MEDICINE | Facility: OTHER | Age: 55
End: 2020-12-22

## 2020-12-22 ENCOUNTER — OFFICE VISIT (OUTPATIENT)
Dept: FAMILY MEDICINE | Facility: OTHER | Age: 55
End: 2020-12-22
Attending: NURSE PRACTITIONER
Payer: COMMERCIAL

## 2020-12-22 VITALS
SYSTOLIC BLOOD PRESSURE: 116 MMHG | WEIGHT: 177.8 LBS | BODY MASS INDEX: 30.76 KG/M2 | HEART RATE: 77 BPM | RESPIRATION RATE: 18 BRPM | TEMPERATURE: 97 F | DIASTOLIC BLOOD PRESSURE: 78 MMHG

## 2020-12-22 DIAGNOSIS — R09.89 RUNNY NOSE: Primary | ICD-10-CM

## 2020-12-22 DIAGNOSIS — R39.89 URINARY PROBLEM: ICD-10-CM

## 2020-12-22 DIAGNOSIS — N39.0 RECURRENT UTI: ICD-10-CM

## 2020-12-22 DIAGNOSIS — N30.01 ACUTE CYSTITIS WITH HEMATURIA: Primary | ICD-10-CM

## 2020-12-22 LAB
ALBUMIN UR-MCNC: NEGATIVE MG/DL
APPEARANCE UR: ABNORMAL
BACTERIA #/AREA URNS HPF: ABNORMAL /HPF
BILIRUB UR QL STRIP: NEGATIVE
COLOR UR AUTO: ABNORMAL
GLUCOSE UR STRIP-MCNC: NEGATIVE MG/DL
HGB UR QL STRIP: ABNORMAL
KETONES UR STRIP-MCNC: NEGATIVE MG/DL
LEUKOCYTE ESTERASE UR QL STRIP: ABNORMAL
MUCOUS THREADS #/AREA URNS LPF: PRESENT /LPF
NITRATE UR QL: NEGATIVE
PH UR STRIP: 5 PH (ref 5–7)
RBC #/AREA URNS AUTO: 2 /HPF (ref 0–2)
SOURCE: ABNORMAL
SP GR UR STRIP: 1 (ref 1–1.03)
UROBILINOGEN UR STRIP-MCNC: NORMAL MG/DL (ref 0–2)
WBC #/AREA URNS AUTO: 96 /HPF (ref 0–5)

## 2020-12-22 PROCEDURE — 81001 URINALYSIS AUTO W/SCOPE: CPT | Mod: ZL | Performed by: NURSE PRACTITIONER

## 2020-12-22 PROCEDURE — 87086 URINE CULTURE/COLONY COUNT: CPT | Mod: ZL | Performed by: NURSE PRACTITIONER

## 2020-12-22 PROCEDURE — 87088 URINE BACTERIA CULTURE: CPT | Mod: ZL | Performed by: NURSE PRACTITIONER

## 2020-12-22 PROCEDURE — 99214 OFFICE O/P EST MOD 30 MIN: CPT | Performed by: NURSE PRACTITIONER

## 2020-12-22 PROCEDURE — G0463 HOSPITAL OUTPT CLINIC VISIT: HCPCS

## 2020-12-22 RX ORDER — NITROFURANTOIN 25; 75 MG/1; MG/1
100 CAPSULE ORAL 2 TIMES DAILY
Qty: 10 CAPSULE | Refills: 0 | Status: SHIPPED | OUTPATIENT
Start: 2020-12-22 | End: 2020-12-27

## 2020-12-22 ASSESSMENT — PAIN SCALES - GENERAL: PAINLEVEL: NO PAIN (0)

## 2020-12-22 NOTE — NURSING NOTE
"Patient presents to the clinic for UTI symptoms. States she feels urgency, frequency, and some incontinence. She is also requesting a COVID test after a flight she recently was on that was not socially distanced.     Chief Complaint   Patient presents with     Urinary Problem       Initial /78 (BP Location: Left arm, Patient Position: Sitting, Cuff Size: Adult Regular)   Pulse 77   Temp 97  F (36.1  C) (Tympanic)   Resp 18   Wt 80.6 kg (177 lb 12.8 oz)   Breastfeeding No   BMI 30.76 kg/m   Estimated body mass index is 30.76 kg/m  as calculated from the following:    Height as of 10/20/20: 1.619 m (5' 3.75\").    Weight as of this encounter: 80.6 kg (177 lb 12.8 oz).  Medication Reconciliation: palak Navarro LPN on 12/22/2020 at 2:00 PM    "

## 2020-12-22 NOTE — PROGRESS NOTES
HPI:    Vikki Carrizales is a 55 year old female  who presents to [facility] today for frequency and urgency. She states that her symptoms started today. Denies dysuria. Denies fevers or chills. Denies acute back pain or pelvic pain. Denies gross hematuria. Denies incontinence.The patient states that she started taking OTC azo today. Denies vaginal discharge or itching. The patient also mentioned during this visit that she traveled via airplane and states that the plane was packed and some of the passengers were not wearing masks. The patient states that she developed rhinitis 8 days ago and was wondering if she should have a COVID test. No rhinitis noted during this visit.     Past Medical History:   Diagnosis Date     Anemia      Anxiety      Cutaneous abscess     2018     Gastro-esophageal reflux disease without esophagitis          Herpesviral infection of urogenital system     No Comments Provided     Impetigo     recurrent - as a child     Major depressive disorder, single episode     ,Rx: zoloft, citalopram and prozac in the past, none since )     Other seasonal allergic rhinitis     lifelong     Personal history of other medical treatment (CODE)      1 child  in childbirth (); one living child (b )     Uncomplicated asthma          Vitamin D deficiency     No Comments Provided     Past Surgical History:   Procedure Laterality Date      SECTION      ,,HELLP syndrome, Platelet transfusions; son       SECTION           COLONOSCOPY  2019    Normal exam, 5 year follow up per Dr. Yadav     COLONOSCOPY N/A 2019    Procedure: COLONOSCOPY;  Surgeon: Miguel A Yadav MD;  Location:  OR     ESOPHAGOSCOPY, GASTROSCOPY, DUODENOSCOPY (EGD), COMBINED N/A 2019    Procedure: ESOPHAGOGASTRODUODENOSCOPY (EGD);  Surgeon: Miguel A Yadav MD;  Location:  OR     GASTRIC BYPASS       LAPAROSCOPIC BYPASS GASTRIC      ,  Lenore en Y Gastric Bypass, Dr Gabriel Torres, [facility]     MOUTH SURGERY       Social History     Tobacco Use     Smoking status: Never Smoker     Smokeless tobacco: Never Used   Substance Use Topics     Alcohol use: Yes     Comment: moderate, about 1 bottle of wine per week     Current Outpatient Medications   Medication Sig Dispense Refill     acetaminophen (TYLENOL) 500 MG tablet Take 500-1,000 mg by mouth every 6 hours as needed for mild pain       albuterol (PROAIR HFA/PROVENTIL HFA/VENTOLIN HFA) 108 (90 Base) MCG/ACT inhaler INHALE 2 PUFFS INTO THE LUNGS EVERY 6 HOURS AS NEEDED FOR SHORTNESS OF BREATH OR DIFFICULT BREATHING OR WHEEZING 54 g 0     buPROPion (WELLBUTRIN XL) 150 MG 24 hr tablet TAKE 2 TABLETS(300 MG) BY MOUTH EVERY MORNING 180 tablet 1     fluticasone (FLONASE) 50 MCG/ACT nasal spray Spray 2 sprays in nostril daily 18.2 mL 11     ibuprofen (ADVIL/MOTRIN) 400 MG tablet Take 400 mg by mouth every 6 hours as needed for moderate pain       traZODone (DESYREL) 50 MG tablet Take 0.5-1 tablets (25-50 mg) by mouth At Bedtime 30 tablet 11     Allergies   Allergen Reactions     Seasonal Allergies      seasonal         Past medical history, past surgical history, current medications and allergies reviewed and accurate to the best of my knowledge.        ROS:  Refer to HPI    /78 (BP Location: Left arm, Patient Position: Sitting, Cuff Size: Adult Regular)   Pulse 77   Temp 97  F (36.1  C) (Tympanic)   Resp 18   Wt 80.6 kg (177 lb 12.8 oz)   Breastfeeding No   BMI 30.76 kg/m      EXAM:  General Appearance: Well appearing female, appropriate appearance for age. No acute distress  Respiratory: normal chest wall and respirations.  Normal effort.  Clear to auscultation bilaterally, no wheezing, crackles or rhonchi.  No increased work of breathing.  No cough appreciated.  Cardiac: RRR with no murmurs  Abdomen: soft, nontender, no rigidity, no rebound tenderness or guarding, normal bowel sounds  present  :  No suprapubic tenderness to palpation.  No CVA tenderness to palpation.    Psychological: normal affect, alert, oriented, and pleasant.       Labs:  Results for orders placed or performed in visit on 12/22/20   UA reflex to Microscopic and Culture     Status: Abnormal    Specimen: Midstream Urine   Result Value Ref Range    Color Urine Dark Yellow     Appearance Urine Slightly Cloudy     Glucose Urine Negative NEG^Negative mg/dL    Bilirubin Urine Negative NEG^Negative    Ketones Urine Negative NEG^Negative mg/dL    Specific Gravity Urine 1.004 1.003 - 1.035    Blood Urine Small (A) NEG^Negative    pH Urine 5.0 5.0 - 7.0 pH    Protein Albumin Urine Negative NEG^Negative mg/dL    Urobilinogen mg/dL Normal 0.0 - 2.0 mg/dL    Nitrite Urine Negative NEG^Negative    Leukocyte Esterase Urine Large (A) NEG^Negative    Source Midstream Urine     RBC Urine 2 0 - 2 /HPF    WBC Urine 96 (H) 0 - 5 /HPF    Bacteria Urine Few (A) NEG^Negative /HPF    Mucous Urine Present (A) NEG^Negative /LPF             ASSESSMENT/PLAN:  1. Acute cystitis with hematuria    - nitroFURantoin macrocrystal-monohydrate (MACROBID) 100 MG capsule; Take 1 capsule (100 mg) by mouth 2 times daily for 5 days  Dispense: 10 capsule; Refill: 0    2. Recurrent UTI    - UROLOGY ADULT REFERRAL; Future    3. Urinary problem    - UA reflex to Microscopic and Culture  - Urine Culture Aerobic Bacterial    Discussed UA results with the patient. Based on the UA results and patient's symptoms the patient will be treated empirically with macrobid.     Urine culture results pending. Depending on the culture results the current course of antibiotic may need to be changed.     This is the 3rd UTI that the patient has had in the past 3 months. A referral was placed to urology for recurrent UTI's.     The patient was told that since it is the 8th day of her rhinitis she should continue to quarantine for the next 2 days to complete her 10 day quarantine, and  would not have to be tested today. Instructed the patient to get tested if she does develop other symptoms.      Encouraged fluids    May use over-the-counter Tylenol or ibuprofen PRN    Discussed warning signs/symptoms indicative of need to f/u    Follow up if symptoms persist or worsen or concerns      I explained my diagnostic considerations and recommendations to the patient, who voiced understanding and agreement with the treatment plan. All questions were answered. We discussed potential side effects of any prescribed or recommended therapies, as well as expectations for response to treatments.    Disclaimer:  This note consists of words and symbols derived from keyboarding, dictation, or using voice recognition software. As a result, there may be errors in the script that have gone undetected. Please consider this when interpreting information found in this note.

## 2020-12-22 NOTE — PATIENT INSTRUCTIONS
Patient Education     Urinary Tract Infections in Women    Urinary tract infections (UTIs) are most often caused by bacteria. These bacteria enter the urinary tract. The bacteria may come from inside the body. Or they may travel from the skin outside the rectum or vagina into the urethra. Female anatomy makes it easy for bacteria from the bowel to enter a woman s urinary tract, which is the most common source of UTI. This means women develop UTIs more often than men. Pain in or around the urinary tract is a common UTI symptom. But the only way to know for sure if you have a UTI for the healthcare provider to test your urine. The two tests that may be done are the urinalysis and urine culture.   Types of UTIs    Cystitis. A bladder infection (cystitis) is the most common UTI in women. You may have urgent or frequent need to pee. You may also have pain, burning when you pee, and bloody urine.    Urethritis. This is an inflamed urethra, which is the tube that carries urine from the bladder to outside the body. You may have lower stomach or back pain. You may also have urgent or frequent need to pee.    Pyelonephritis. This is a kidney infection. If not treated, it can be serious and damage your kidneys. In severe cases, you may need to stay in the hospital. You may have a fever and lower back pain.    Medicines to treat a UTI  Most UTIs are treated with antibiotics. These kill the bacteria. The length of time you need to take them depends on the type of infection. It may be as short as 3 days. If you have repeated UTIs, you may need a low-dose antibiotic for several months. Take antibiotics exactly as directed. Don t stop taking them until all of the medicine is gone. If you stop taking the antibiotic too soon, the infection may not go away. You may also develop a resistance to the antibiotic. This can make it much harder to treat.   Lifestyle changes to treat and prevent UTIs   The lifestyle changes below will help  get rid of your UTI. They may also help prevent future UTIs.     Drink plenty of fluids. This includes water, juice, or other caffeine-free drinks. Fluids help flush bacteria out of your body.    Empty your bladder. Always empty your bladder when you feel the urge to pee. And always pee before going to sleep. Urine that stays in your bladder can lead to infection. Try to pee before and after sex as well.    Practice good personal hygiene. Wipe yourself from front to back after using the toilet. This helps keep bacteria from getting into the urethra.    Use condoms during sex. These help prevent UTIs caused by sexually transmitted bacteria. Also don't use spermicides during sex. These can increase the risk for UTIs. Choose other forms of birth control instead. For women who tend to get UTIs after sex, a low-dose of a preventive antibiotic may be used. Be sure to discuss this option with your healthcare provider.    Follow up with your healthcare provider as directed. He or she may test to make sure the infection has cleared. If needed, more treatment may be started.  [employer] last reviewed this educational content on 7/1/2019 2000-2020 [employer]. [address] [place] [address], [place] [address]. All rights reserved. This information is not intended as a substitute for professional medical care. Always follow your healthcare professional's instructions.

## 2020-12-23 ENCOUNTER — ALLIED HEALTH/NURSE VISIT (OUTPATIENT)
Dept: FAMILY MEDICINE | Facility: OTHER | Age: 55
End: 2020-12-23
Attending: FAMILY MEDICINE
Payer: COMMERCIAL

## 2020-12-23 DIAGNOSIS — R09.89 RUNNY NOSE: ICD-10-CM

## 2020-12-23 PROCEDURE — U0003 INFECTIOUS AGENT DETECTION BY NUCLEIC ACID (DNA OR RNA); SEVERE ACUTE RESPIRATORY SYNDROME CORONAVIRUS 2 (SARS-COV-2) (CORONAVIRUS DISEASE [COVID-19]), AMPLIFIED PROBE TECHNIQUE, MAKING USE OF HIGH THROUGHPUT TECHNOLOGIES AS DESCRIBED BY CMS-2020-01-R: HCPCS | Mod: ZL | Performed by: FAMILY MEDICINE

## 2020-12-23 PROCEDURE — C9803 HOPD COVID-19 SPEC COLLECT: HCPCS

## 2020-12-23 NOTE — PROGRESS NOTES
Chief Complaint   Patient presents with     Covid 19 Testing     Runny nose  Medication Reconciliation: complete    Bebe Rojas LPN

## 2020-12-23 NOTE — TELEPHONE ENCOUNTER
Notified patient of providers note and transferred to scheduling to make appointment.  Mary Lou La LPN ....................  12/23/2020   10:03 AM

## 2020-12-24 LAB
BACTERIA SPEC CULT: ABNORMAL
SARS-COV-2 RNA SPEC QL NAA+PROBE: NOT DETECTED
SPECIMEN SOURCE: ABNORMAL
SPECIMEN SOURCE: NORMAL

## 2020-12-26 DIAGNOSIS — J45.909 UNCOMPLICATED ASTHMA, UNSPECIFIED ASTHMA SEVERITY, UNSPECIFIED WHETHER PERSISTENT: ICD-10-CM

## 2020-12-29 NOTE — TELEPHONE ENCOUNTER
Requested Prescriptions   Pending Prescriptions Disp Refills     albuterol (PROAIR HFA/PROVENTIL HFA/VENTOLIN HFA) 108 (90 Base) MCG/ACT inhaler [Pharmacy Med Name: ALBUTEROL HFA INH(200 PUFFS)18GM] 54 g 0     Sig: INHALE 2 PUFFS INTO THE LUNGS EVERY 6 HOURS AS NEEDED FOR SHORTNESS OF BREATH OR DIFFICULT BREATHING OR WHEEZING      Last Written Prescription Date:  10/20/2020  Last Fill Quantity: 54g,  # refills: 0   Last office visit: 5/5/2020   Future Office Visit:  None  Asthma Maintenance Inhalers - Anticholinergics Failed  Asthma control assessment score within normal limits in last 6 months       Pt is not eligible for reuben refill of this medication because pt is due for 6-month asthma check-up, 90 day reuben refill already given on 10/20/2020. Will route to provider for consideration.    Elidia Soni RN  ....................  12/29/2020   8:07 AM

## 2020-12-30 RX ORDER — ALBUTEROL SULFATE 90 UG/1
AEROSOL, METERED RESPIRATORY (INHALATION)
Qty: 54 G | Refills: 0 | Status: SHIPPED | OUTPATIENT
Start: 2020-12-30 | End: 2021-03-15

## 2021-01-03 ENCOUNTER — HEALTH MAINTENANCE LETTER (OUTPATIENT)
Age: 56
End: 2021-01-03

## 2021-01-04 ENCOUNTER — OFFICE VISIT (OUTPATIENT)
Dept: UROLOGY | Facility: OTHER | Age: 56
End: 2021-01-04
Attending: NURSE PRACTITIONER
Payer: COMMERCIAL

## 2021-01-04 VITALS
DIASTOLIC BLOOD PRESSURE: 78 MMHG | BODY MASS INDEX: 30.45 KG/M2 | WEIGHT: 176 LBS | RESPIRATION RATE: 16 BRPM | SYSTOLIC BLOOD PRESSURE: 122 MMHG | HEART RATE: 76 BPM

## 2021-01-04 DIAGNOSIS — N39.0 RECURRENT UTI: ICD-10-CM

## 2021-01-04 LAB
ALBUMIN UR-MCNC: NEGATIVE MG/DL
APPEARANCE UR: CLEAR
BILIRUB UR QL STRIP: NEGATIVE
COLOR UR AUTO: YELLOW
GLUCOSE UR STRIP-MCNC: NEGATIVE MG/DL
HGB UR QL STRIP: ABNORMAL
KETONES UR STRIP-MCNC: NEGATIVE MG/DL
LEUKOCYTE ESTERASE UR QL STRIP: NEGATIVE
MUCOUS THREADS #/AREA URNS LPF: PRESENT /LPF
NITRATE UR QL: NEGATIVE
PH UR STRIP: 5 PH (ref 5–7)
RBC #/AREA URNS AUTO: 2 /HPF (ref 0–2)
SOURCE: ABNORMAL
SP GR UR STRIP: 1.01 (ref 1–1.03)
UROBILINOGEN UR STRIP-MCNC: NORMAL MG/DL (ref 0–2)
WBC #/AREA URNS AUTO: <1 /HPF (ref 0–5)

## 2021-01-04 PROCEDURE — 81001 URINALYSIS AUTO W/SCOPE: CPT | Mod: ZL | Performed by: UROLOGY

## 2021-01-04 PROCEDURE — 99203 OFFICE O/P NEW LOW 30 MIN: CPT | Performed by: UROLOGY

## 2021-01-04 PROCEDURE — 51798 US URINE CAPACITY MEASURE: CPT | Performed by: UROLOGY

## 2021-01-04 PROCEDURE — G0463 HOSPITAL OUTPT CLINIC VISIT: HCPCS | Mod: 25

## 2021-01-04 ASSESSMENT — PAIN SCALES - GENERAL: PAINLEVEL: NO PAIN (0)

## 2021-01-04 NOTE — NURSING NOTE
Pt presents to clinic for re current UTI follow up    Review of Systems:    Weight loss:    No     Recent fever/chills:  No   Night sweats:   No  Current skin rash:  No   Recent hair loss:  No  Heat intolerance:  No   Cold intolerance:  No  Chest pain:   No   Palpitations:   No  Shortness of breath:  No   Wheezing:   No  Constipation:    No   Diarrhea:   No   Nausea:   No   Vomiting:   No   Kidney/side pain:  No   Back pain:   No  Frequent headaches:  No   Dizziness:     No  Leg swelling:   No   Calf pain:    No    Parents, brothers or sisters with history of kidney cancer:   No  Parents, brothers or sisters with history of bladder cancer: No  Father or brother with history of prostate cancer:  No    Post-Void Residual  A post-void residual was measured by ultrasonic bladder scanner.  1 mL

## 2021-01-04 NOTE — PROGRESS NOTES
Type of Visit  NPV    Chief Complaint  Frequent UTI    HPI  Ms. Carrizales is a 55 year old female who presents as a referral for multiple UTIs.    She reports approximately 3 infections in the last 4 months.  Her UTI symptoms include dysuria, frequency and urgency.  There is not a relationship with sexual activity.  She has not tried vaginal estrogen.    She does recall some vague left-sided flank pain 2 to 3 months ago.  The pain resolved spontaneously.  She has no history of kidney stones.  No associated gross hematuria.  No recent flank pain.    Incontinence    No  Constipation    Yes  History of kidney stones  No  History of smoking?   No   History of hematuria?   No      Past Medical History  She  has a past medical history of Anemia, Anxiety, Cutaneous abscess, Gastro-esophageal reflux disease without esophagitis, Herpesviral infection of urogenital system, Impetigo, Major depressive disorder, single episode, Other seasonal allergic rhinitis, Personal history of other medical treatment (CODE), Uncomplicated asthma, and Vitamin D deficiency.  Patient Active Problem List   Diagnosis     Allergic rhinitis     Anemia, iron deficiency     Asthma     Depression, major, recurrent (H)     Genital herpes     Esophageal reflux     Menorrhagia with irregular cycle     Restless leg syndrome     Non morbid obesity due to excess calories     Thrombophlebitis leg     Venous insufficiency     Vitamin B12 deficiency     Right ear pain       Past Surgical History  She  has a past surgical history that includes Laparoscopic bypass gastric;  section;  section; gastric bypass (); Mouth surgery; colonoscopy (2019); Esophagoscopy, gastroscopy, duodenoscopy (EGD), combined (N/A, 2019); and Colonoscopy (N/A, 2019).    Medications  She has a current medication list which includes the following prescription(s): acetaminophen, albuterol, bupropion, fluticasone, ibuprofen, and  trazodone.    Allergies  Allergies   Allergen Reactions     Seasonal Allergies      seasonal     Social History  She  reports that she has never smoked. She has never used smokeless tobacco. She reports current alcohol use. She reports that she does not use drugs.  No drug abuse.    Family History  Family History   Problem Relation Age of Onset     Other - See Comments Mother      Aortic aneurysm Father      Arrhythmia Father      Diabetes Maternal Grandmother         Diabetes     Other - See Comments Maternal Grandmother         Stroke     Other - See Comments Maternal Grandfather         Depression     Osteoporosis Maternal Grandfather         Osteoporosis     Diabetes Maternal Aunt         Diabetes     Other - See Comments Sister         Thrombophlebitis     Heart Disease Brother         Heart Disease     Breast Cancer No family hx of         Cancer-breast       Review of Systems  I personally reviewed the ROS with the patient.    Nursing Notes:   Kathryn Shah LPN  1/4/2021 11:04 AM  Signed  Pt presents to clinic for re current UTI follow up    Review of Systems:    Weight loss:    No     Recent fever/chills:  No   Night sweats:   No  Current skin rash:  No   Recent hair loss:  No  Heat intolerance:  No   Cold intolerance:  No  Chest pain:   No   Palpitations:   No  Shortness of breath:  No   Wheezing:   No  Constipation:    No   Diarrhea:   No   Nausea:   No   Vomiting:   No   Kidney/side pain:  No   Back pain:   No  Frequent headaches:  No   Dizziness:     No  Leg swelling:   No   Calf pain:    No    Parents, brothers or sisters with history of kidney cancer:   No  Parents, brothers or sisters with history of bladder cancer: No  Father or brother with history of prostate cancer:  No    Post-Void Residual  A post-void residual was measured by ultrasonic bladder scanner.  1 mL        Physical Exam  Vitals:    01/04/21 1052   BP: 122/78   BP Location: Right arm   Patient Position: Sitting   Cuff Size:  Adult Regular   Pulse: 76   Resp: 16   Weight: 79.8 kg (176 lb)     Constitutional: NAD, WDWN.   Head: NCAT  Eyes: Conjunctivae normal  Cardiovascular:  Regular rate  Pulmonary/Chest: Respirations are even and non-labored bilaterally.  Abdominal: Soft. No distension, tenderness, masses or guarding. No CVA tenderness.  Genitourinary: Nonpalpable bladder  Neurological: A + O x 3.  Cranial Nerves II-XII grossly intact.  Extremities: AMRIK x 4, Warm. No clubbing.  No cyanosis.    Skin: Pink, warm and dry.  No rashes noted.  Psychiatric:  Normal mood and affect    Labs  Results for JERONIMO CARRIZALES (MRN [id number]) as of 1/4/2021 11:04   12/22/2020 13:49 12/22/2020 14:00   Color Urine  Dark Yellow   Appearance Urine  Slightly Cloudy   Glucose Urine  Negative   Bilirubin Urine  Negative   Ketones Urine  Negative   Specific Gravity Urine  1.004   pH Urine  5.0   Protein Albumin Urine  Negative   Urobilinogen mg/dL  Normal   Nitrite Urine  Negative   Blood Urine  Small (A)   Leukocyte Esterase Urine  Large (A)   Source  Midstream Urine   WBC Urine  96 (H)   RBC Urine  2   Bacteria Urine  Few (A)   Mucous Urine  Present (A)   Specimen Description Midstream Urine    Culture Micro 10,000 to 50,000 ... (A)        Post-Void Residual  A post-void residual was measured by ultrasonic bladder scanner.  1 mL today    Assessment  Ms. Carrizales is a 55 year old female who presents with recurrent UTIs.    Patient demonstrates sufficient bladder emptying today.    We discussed different treatment strategies for recurrent symptomatic UTIs including antibiotics in the form of on-demand, post-coital and suppressive daily dosing.    Also discussed use of methenamine to decrease frequency of UTIs.    Plan  Increase fluid intake and discussed timed voiding.  Repeat urinalysis today while she is asymptomatic to assure resolution of UTI and rule out bacterial persistence  If she redevelops any flank pain, hematuria or another UTI we will consider  upper tract imaging

## 2021-03-08 DIAGNOSIS — F41.1 GAD (GENERALIZED ANXIETY DISORDER): ICD-10-CM

## 2021-03-09 RX ORDER — BUPROPION HYDROCHLORIDE 150 MG/1
TABLET ORAL
Qty: 180 TABLET | Refills: 0 | Status: SHIPPED | OUTPATIENT
Start: 2021-03-09 | End: 2021-06-09

## 2021-03-09 NOTE — TELEPHONE ENCOUNTER
"Requested Prescriptions   Pending Prescriptions Disp Refills     buPROPion (WELLBUTRIN XL) 150 MG 24 hr tablet [Pharmacy Med Name: BUPROPION XL 150MG TABLETS (24 H)] 180 tablet 1     Sig: TAKE 2 TABLETS(300 MG) BY MOUTH EVERY MORNING       SSRIs Protocol Passed - 3/8/2021  9:01 AM        Passed - Recent (12 mo) or future (30 days) visit within the authorizing provider's specialty     Patient has had an office visit with the authorizing provider or a provider within the authorizing providers department within the previous 12 mos or has a future within next 30 days. See \"Patient Info\" tab in inbasket, or \"Choose Columns\" in Meds & Orders section of the refill encounter.    LOV 5/5/2020          Passed - Medication is Bupropion     If the medication is Bupropion (Wellbutrin), and the patient is taking for smoking cessation; OK to refill.          Passed - Medication is active on med list        Passed - Patient is age 18 or older        Passed - No active pregnancy on record        Passed - No positive pregnancy test in last 12 months         Prescription approved per [facility] Refill Protocol.  Padmini Mai RN on 3/9/2021 at 8:41 AM      "

## 2021-03-14 DIAGNOSIS — J45.909 UNCOMPLICATED ASTHMA, UNSPECIFIED ASTHMA SEVERITY, UNSPECIFIED WHETHER PERSISTENT: Primary | ICD-10-CM

## 2021-03-15 RX ORDER — ALBUTEROL SULFATE 90 UG/1
AEROSOL, METERED RESPIRATORY (INHALATION)
Qty: 54 G | Refills: 0 | Status: SHIPPED | OUTPATIENT
Start: 2021-03-15 | End: 2021-06-04

## 2021-03-15 NOTE — TELEPHONE ENCOUNTER
[facility] sent Rx request for the following:      Requested Prescriptions   Pending Prescriptions Disp Refills   albuterol (PROAIR HFA/PROVENTIL HFA/VENTOLIN HFA) 108 (90 Base) MCG/ACT inhaler [Pharmacy Med Name: ALBUTEROL HFA INH(200 PUFFS)18GM] 54 g 0    Sig: INHALE 2 PUFFS INTO THE LUNGS EVERY 6 HOURS AS NEEDED FOR SHORTNESS OF BREATH OR DIFFICULT BREATHING OR WHEEZING   Last Prescription Date:   12/30/20  Last Fill Qty/Refills:         54, R-0    Last Office Visit:              5/5/20  Future Office visit:           None  Routing refill request to provider for review/approval because:   Asthma Maintenance Inhalers - Anticholinergics Failed - 3/15/2021  3:00 PM       Failed - Asthma control assessment score within normal limits in last 6 months      Short-Acting Beta Agonist Inhalers Protocol  Failed - 3/15/2021  3:00 PM       Failed - Asthma control assessment score within normal limits in last 6 months      Patient is overdue for 6 month asthma check-up. Pt will be due for annual exam around 5/5/21. 90 day reuben refill was given on 10/20 and 12/29/20. Routing to Dr. Calderon, for refill consideration. Unable to complete prescription refill per RN Medication Refill Policy. Mirna Keyes RN .............. 3/15/2021  3:05 PM

## 2021-03-21 ENCOUNTER — MYC MEDICAL ADVICE (OUTPATIENT)
Dept: FAMILY MEDICINE | Facility: OTHER | Age: 56
End: 2021-03-21

## 2021-04-14 ENCOUNTER — MYC MEDICAL ADVICE (OUTPATIENT)
Dept: FAMILY MEDICINE | Facility: OTHER | Age: 56
End: 2021-04-14

## 2021-05-24 ENCOUNTER — MYC MEDICAL ADVICE (OUTPATIENT)
Dept: FAMILY MEDICINE | Facility: OTHER | Age: 56
End: 2021-05-24

## 2021-06-03 DIAGNOSIS — J45.909 UNCOMPLICATED ASTHMA, UNSPECIFIED ASTHMA SEVERITY, UNSPECIFIED WHETHER PERSISTENT: ICD-10-CM

## 2021-06-03 NOTE — LETTER
June 4, 2021      Vikki Carrizales  [address] [address]        Dear Vikki,     This letter is to remind you that you are due for an exam with a privider. Your last comprehensive visit was more than 12 months ago.    A LIMITED refill of albuterol (PROAIR HFA/PROVENTIL HFA/VENTOLIN HFA)  has been called into your pharmacy. Additional refills require you to complete this appointment.    Please call the clinic at [phone] to schedule your appointment.    If you should require additional refills before your scheduled appointment, please contact your pharmacy and we will refill your medication until the date of your appointment.    Thank you for choosing [facility] and [facility] for your health care needs.    Sincerely,    Refill STEPHAN  [facility]

## 2021-06-04 NOTE — TELEPHONE ENCOUNTER
[facility] sent Rx request for the following:      Requested Prescriptions   Pending Prescriptions Disp Refills     albuterol (PROAIR HFA/PROVENTIL HFA/VENTOLIN HFA) 108 (90 Base) MCG/ACT inhaler [Pharmacy Med Name: ALBUTEROL HFA INH(200 PUFFS)18GM] 54 g 0     Sig: INHALE 2 PUFFS INTO THE LUNGS EVERY 6 HOURS AS NEEDED FOR SHORTNESS OF BREATH OR DIFFICULT BREATHING OR WHEEZING       Asthma Maintenance Inhalers - Anticholinergics Failed - 6/3/2021  4:04 AM        Failed - Asthma control assessment score within normal limits in last 6 months     Please review ACT score.          Short-Acting Beta Agonist Inhalers Protocol  Failed - 6/3/2021  4:04 AM        Failed - Asthma control assessment score within normal limits in last 6 months     Please review ACT score.             Last Prescription Date:   3/15/21  Last Fill Qty/Refills:         54g, R-0   Last Office Visit:              5/5/20  Future Office visit:          None   Routing refill request to provider for review/approval because:    Patient has not seen a provider in clinic since 5/5/20. Patient sent a letter and a a my chart notice. Routed to provider for review and consideration. Jewell Tapia RN on 6/4/2021 at 9:38 AM

## 2021-06-07 RX ORDER — ALBUTEROL SULFATE 90 UG/1
AEROSOL, METERED RESPIRATORY (INHALATION)
Qty: 18 G | Refills: 0 | Status: SHIPPED | OUTPATIENT
Start: 2021-06-07 | End: 2022-03-06

## 2021-06-08 DIAGNOSIS — F41.1 GAD (GENERALIZED ANXIETY DISORDER): Primary | ICD-10-CM

## 2021-06-09 RX ORDER — BUPROPION HYDROCHLORIDE 150 MG/1
TABLET ORAL
Qty: 180 TABLET | Refills: 0 | Status: SHIPPED | OUTPATIENT
Start: 2021-06-09 | End: 2021-09-09

## 2021-06-09 NOTE — TELEPHONE ENCOUNTER
[facility] sent Rx request for the following:      Requested Prescriptions   Pending Prescriptions Disp Refills   buPROPion (WELLBUTRIN XL) 150 MG 24 hr tablet [Pharmacy Med Name: BUPROPION XL 150MG TABLETS (24 H)] 180 tablet 0    Sig: TAKE 2 TABLETS(300 MG) BY MOUTH EVERY MORNING   Last Prescription Date:   3/9/21  Last Fill Qty/Refills:         180, R-0    Last Office Visit:              5/5/20   Future Office visit:           None  Routing refill request to provider for review/approval because:  Medication is Bupropion    If the medication is Bupropion (Wellbutrin), and the patient is taking for smoking cessation; OK to refill.     Patient is overdue for annual exam. Reminder letter sent to Pt via [employer] on 6/4/21. Unable to complete prescription refill per RN Medication Refill Policy. Mirna Keyes RN .............. 6/9/2021  10:52 AM

## 2021-06-19 ENCOUNTER — HEALTH MAINTENANCE LETTER (OUTPATIENT)
Age: 56
End: 2021-06-19

## 2021-09-07 DIAGNOSIS — F41.1 GAD (GENERALIZED ANXIETY DISORDER): ICD-10-CM

## 2021-09-09 RX ORDER — BUPROPION HYDROCHLORIDE 150 MG/1
TABLET ORAL
Qty: 180 TABLET | Refills: 3 | Status: SHIPPED | OUTPATIENT
Start: 2021-09-09 | End: 2022-01-03

## 2021-09-09 NOTE — TELEPHONE ENCOUNTER
"Requested Prescriptions   Pending Prescriptions Disp Refills     buPROPion (WELLBUTRIN XL) 150 MG 24 hr tablet [Pharmacy Med Name: BUPROPION XL 150MG TABLETS (24 H)] 180 tablet 0     Sig: TAKE 2 TABLETS(300 MG) BY MOUTH EVERY MORNING       SSRIs Protocol Passed - 9/7/2021  4:04 AM        Passed - Recent (12 mo) or future (30 days) visit within the authorizing provider's specialty     Patient has had an office visit with the authorizing provider or a provider within the authorizing providers department within the previous 12 mos or has a future within next 30 days. See \"Patient Info\" tab in inbasket, or \"Choose Columns\" in Meds & Orders section of the refill encounter.              Passed - Medication is Bupropion     If the medication is Bupropion (Wellbutrin), and the patient is taking for smoking cessation; OK to refill.          Passed - Medication is active on med list        Passed - Patient is age 18 or older        Passed - No active pregnancy on record        Passed - No positive pregnancy test in last 12 months           buPROPion (WELLBUTRIN XL) 150 MG 24 hr tablet     Sig: TAKE 2 TABLETS(300 MG) BY MOUTH EVERY MORNING           Last Written Prescription Date:  6/9/21  Last Fill Quantity: 180,   # refills: 0  Last Office Visit: 5/5/20  Future Office visit:       Routing refill request to provider for review/approval because:  Drug not on the [facility], [facility] or [facility] refill protocol or controlled substance Padmini Mai RN on 9/9/2021 at 3:52 PM      "

## 2021-10-09 ENCOUNTER — HEALTH MAINTENANCE LETTER (OUTPATIENT)
Age: 56
End: 2021-10-09

## 2021-10-18 ENCOUNTER — TELEPHONE (OUTPATIENT)
Dept: FAMILY MEDICINE | Facility: OTHER | Age: 56
End: 2021-10-18

## 2021-10-18 NOTE — TELEPHONE ENCOUNTER
Spoke with Patient she is currently taking an antibiotic for an infection in her mouth. She will wait for her flu vaccine until she returns from [place]. Emy Jay LPN .......................10/18/2021  5:08 PM

## 2021-10-18 NOTE — TELEPHONE ENCOUNTER
Please call the patient.  Can she get a flu shot with the two new medications she has started.    She also has a UTI  And is already taking medication will this work for the UTI?      She sent 2 mychart notes as well about this.      Tia Shah on 10/18/2021 at 2:51 PM

## 2021-10-21 NOTE — PROGRESS NOTES
Outpatient Speech Language Pathology Discharge Note     Patient: Vikki Carrizales  : 1965    Beginning/End Dates of Reporting Period:  2020 to 2020    Referring Provider: Dr. Harrison    Therapy Diagnosis: Involuntary voicing    Client Self Report:   No follow-up sessions attended or rescheduled.        Goals:  Goal Identifier Voice control   Goal Description Pt will independently implement voicing reduction strategies across 90% of opportunities across 2 sessions with the therapist.    Target Date 20   Date Met      Progress (detail required for progress note):  No follow-up sessions attended or rescheduled.      Goal Identifier Strategy Log   Goal Description Pt will implement strategies within the home as characterized by vocalizaiton reduction log across 30 consecutive days.   Target Date 20   Date Met      Progress (detail required for progress note):  No follow-up sessions attended or rescheduled.      Plan:  Discharge from therapy.    Discharge:    Reason for Discharge: Patient has not made expected progress due to interrupted treatment attendance.  Patient has failed to schedule further appointments.    Equipment Issued: no      Discharge Plan: Patient to continue home program.

## 2022-01-01 ENCOUNTER — MYC MEDICAL ADVICE (OUTPATIENT)
Dept: FAMILY MEDICINE | Facility: OTHER | Age: 57
End: 2022-01-01
Payer: COMMERCIAL

## 2022-01-29 ENCOUNTER — HEALTH MAINTENANCE LETTER (OUTPATIENT)
Age: 57
End: 2022-01-29

## 2022-03-06 ENCOUNTER — MYC REFILL (OUTPATIENT)
Dept: FAMILY MEDICINE | Facility: OTHER | Age: 57
End: 2022-03-06
Payer: COMMERCIAL

## 2022-03-06 DIAGNOSIS — J45.909 UNCOMPLICATED ASTHMA, UNSPECIFIED ASTHMA SEVERITY, UNSPECIFIED WHETHER PERSISTENT: ICD-10-CM

## 2022-03-07 RX ORDER — ALBUTEROL SULFATE 90 UG/1
1 AEROSOL, METERED RESPIRATORY (INHALATION) EVERY 6 HOURS PRN
Qty: 18 G | Refills: 4 | Status: SHIPPED | OUTPATIENT
Start: 2022-03-07 | End: 2022-06-07

## 2022-03-07 RX ORDER — ALBUTEROL SULFATE 90 UG/1
AEROSOL, METERED RESPIRATORY (INHALATION)
Qty: 18 G | Refills: 0 | OUTPATIENT
Start: 2022-03-07

## 2022-03-07 NOTE — TELEPHONE ENCOUNTER
[facility] [place] sent Rx request for the following:      albuterol (PROAIR HFA/PROVENTIL HFA/VENTOLIN HFA) 108 (90 Base) MCG/ACT inhaler 18 g 4 3/7/2022  No   Sig - Route: Inhale 1 puff into the lungs every 6 hours as needed for shortness of breath / dyspnea or wheezing - Inhalation   Sent to pharmacy as: Albuterol Sulfate  (90 Base) MCG/ACT Aerosol Solution (PROAIR HFA/PROVENTIL HFA/VENTOLIN HFA)   Class: E-Prescribe   Notes to Pharmacy: Pharmacy may dispense brand covered by insurance (Proair, or proventil or ventolin or generic albuterol inhaler)   Order: [id number]   E-Prescribing Status: Receipt confirmed by pharmacy (3/7/2022  8:43 AM CST)     [id number] - [place], [place] - [address] AT [facility] [address]     Mirna Keyes RN .............. 3/7/2022  3:22 PM

## 2022-05-15 ENCOUNTER — MYC MEDICAL ADVICE (OUTPATIENT)
Dept: FAMILY MEDICINE | Facility: OTHER | Age: 57
End: 2022-05-15
Payer: COMMERCIAL

## 2022-06-07 ENCOUNTER — OFFICE VISIT (OUTPATIENT)
Dept: FAMILY MEDICINE | Facility: OTHER | Age: 57
End: 2022-06-07
Attending: PHYSICIAN ASSISTANT
Payer: COMMERCIAL

## 2022-06-07 VITALS
WEIGHT: 175.6 LBS | TEMPERATURE: 97.2 F | BODY MASS INDEX: 29.98 KG/M2 | RESPIRATION RATE: 18 BRPM | DIASTOLIC BLOOD PRESSURE: 84 MMHG | SYSTOLIC BLOOD PRESSURE: 129 MMHG | OXYGEN SATURATION: 97 % | HEIGHT: 64 IN | HEART RATE: 83 BPM

## 2022-06-07 DIAGNOSIS — Z00.00 ROUTINE HISTORY AND PHYSICAL EXAMINATION OF ADULT: Primary | ICD-10-CM

## 2022-06-07 DIAGNOSIS — Z98.84 HISTORY OF GASTRIC BYPASS: ICD-10-CM

## 2022-06-07 DIAGNOSIS — N94.10 PAIN IN FEMALE GENITALIA ON INTERCOURSE: ICD-10-CM

## 2022-06-07 DIAGNOSIS — L98.9 SKIN LESION: ICD-10-CM

## 2022-06-07 DIAGNOSIS — Z23 NEED FOR VACCINATION FOR PNEUMOCOCCUS: ICD-10-CM

## 2022-06-07 DIAGNOSIS — J45.909 UNCOMPLICATED ASTHMA, UNSPECIFIED ASTHMA SEVERITY, UNSPECIFIED WHETHER PERSISTENT: ICD-10-CM

## 2022-06-07 DIAGNOSIS — D50.8 OTHER IRON DEFICIENCY ANEMIA: ICD-10-CM

## 2022-06-07 DIAGNOSIS — E53.8 VITAMIN B12 DEFICIENCY: ICD-10-CM

## 2022-06-07 DIAGNOSIS — Z13.1 SCREENING FOR DIABETES MELLITUS: ICD-10-CM

## 2022-06-07 DIAGNOSIS — Z12.31 ENCOUNTER FOR SCREENING MAMMOGRAM FOR MALIGNANT NEOPLASM OF BREAST: ICD-10-CM

## 2022-06-07 DIAGNOSIS — Z13.220 SCREENING CHOLESTEROL LEVEL: ICD-10-CM

## 2022-06-07 PROBLEM — N92.1 MENORRHAGIA WITH IRREGULAR CYCLE: Status: RESOLVED | Noted: 2017-04-11 | Resolved: 2022-06-07

## 2022-06-07 LAB
ALBUMIN SERPL-MCNC: 4.2 G/DL (ref 3.5–5.7)
ALP SERPL-CCNC: 110 U/L (ref 34–104)
ALT SERPL W P-5'-P-CCNC: 18 U/L (ref 7–52)
ANION GAP SERPL CALCULATED.3IONS-SCNC: 5 MMOL/L (ref 3–14)
AST SERPL W P-5'-P-CCNC: 27 U/L (ref 13–39)
BASOPHILS # BLD AUTO: 0.1 10E3/UL (ref 0–0.2)
BASOPHILS NFR BLD AUTO: 2 %
BILIRUB SERPL-MCNC: 0.5 MG/DL (ref 0.3–1)
BUN SERPL-MCNC: 7 MG/DL (ref 7–25)
CALCIUM SERPL-MCNC: 9.4 MG/DL (ref 8.6–10.3)
CHLORIDE BLD-SCNC: 107 MMOL/L (ref 98–107)
CHOLEST SERPL-MCNC: 181 MG/DL
CO2 SERPL-SCNC: 28 MMOL/L (ref 21–31)
CREAT SERPL-MCNC: 0.76 MG/DL (ref 0.6–1.2)
EOSINOPHIL # BLD AUTO: 0.2 10E3/UL (ref 0–0.7)
EOSINOPHIL NFR BLD AUTO: 5 %
ERYTHROCYTE [DISTWIDTH] IN BLOOD BY AUTOMATED COUNT: 14.3 % (ref 10–15)
FASTING STATUS PATIENT QL REPORTED: YES
FERRITIN SERPL-MCNC: 5 NG/ML (ref 24–336)
GFR SERPL CREATININE-BSD FRML MDRD: >90 ML/MIN/1.73M2
GLUCOSE BLD-MCNC: 86 MG/DL (ref 70–105)
HCT VFR BLD AUTO: 36.8 % (ref 35–47)
HDLC SERPL-MCNC: 63 MG/DL (ref 23–92)
HGB BLD-MCNC: 12 G/DL (ref 11.7–15.7)
IMM GRANULOCYTES # BLD: 0 10E3/UL
IMM GRANULOCYTES NFR BLD: 0 %
IRON SATN MFR SERPL: 8 % (ref 20–55)
IRON SERPL-MCNC: 38 UG/DL (ref 50–212)
LDLC SERPL CALC-MCNC: 104 MG/DL
LYMPHOCYTES # BLD AUTO: 1.6 10E3/UL (ref 0.8–5.3)
LYMPHOCYTES NFR BLD AUTO: 51 %
MCH RBC QN AUTO: 28.8 PG (ref 26.5–33)
MCHC RBC AUTO-ENTMCNC: 32.6 G/DL (ref 31.5–36.5)
MCV RBC AUTO: 88 FL (ref 78–100)
MONOCYTES # BLD AUTO: 0.2 10E3/UL (ref 0–1.3)
MONOCYTES NFR BLD AUTO: 7 %
NEUTROPHILS # BLD AUTO: 1.1 10E3/UL (ref 1.6–8.3)
NEUTROPHILS NFR BLD AUTO: 35 %
NONHDLC SERPL-MCNC: 118 MG/DL
NRBC # BLD AUTO: 0 10E3/UL
NRBC BLD AUTO-RTO: 0 /100
PLATELET # BLD AUTO: 251 10E3/UL (ref 150–450)
POTASSIUM BLD-SCNC: 4.2 MMOL/L (ref 3.5–5.1)
PROT SERPL-MCNC: 6.9 G/DL (ref 6.4–8.9)
RBC # BLD AUTO: 4.17 10E6/UL (ref 3.8–5.2)
SODIUM SERPL-SCNC: 140 MMOL/L (ref 134–144)
TIBC SERPL-MCNC: 470.4 UG/DL (ref 245–400)
TRANSFERRIN SERPL-MCNC: 336 MG/DL (ref 203–362)
TRIGL SERPL-MCNC: 70 MG/DL
UIBC (UNSATURATED): 432.4 MG/DL
VIT B12 SERPL-MCNC: 173 PG/ML (ref 180–914)
WBC # BLD AUTO: 3.2 10E3/UL (ref 4–11)

## 2022-06-07 PROCEDURE — 80061 LIPID PANEL: CPT | Mod: ZL | Performed by: PHYSICIAN ASSISTANT

## 2022-06-07 PROCEDURE — 90471 IMMUNIZATION ADMIN: CPT

## 2022-06-07 PROCEDURE — 82728 ASSAY OF FERRITIN: CPT | Mod: ZL | Performed by: PHYSICIAN ASSISTANT

## 2022-06-07 PROCEDURE — 82607 VITAMIN B-12: CPT | Mod: ZL | Performed by: PHYSICIAN ASSISTANT

## 2022-06-07 PROCEDURE — 85014 HEMATOCRIT: CPT | Mod: ZL | Performed by: PHYSICIAN ASSISTANT

## 2022-06-07 PROCEDURE — 36415 COLL VENOUS BLD VENIPUNCTURE: CPT | Mod: ZL | Performed by: PHYSICIAN ASSISTANT

## 2022-06-07 PROCEDURE — 99396 PREV VISIT EST AGE 40-64: CPT | Performed by: PHYSICIAN ASSISTANT

## 2022-06-07 PROCEDURE — G0463 HOSPITAL OUTPT CLINIC VISIT: HCPCS

## 2022-06-07 PROCEDURE — 90677 PCV20 VACCINE IM: CPT

## 2022-06-07 PROCEDURE — 83550 IRON BINDING TEST: CPT | Mod: ZL | Performed by: PHYSICIAN ASSISTANT

## 2022-06-07 PROCEDURE — 80053 COMPREHEN METABOLIC PANEL: CPT | Mod: ZL | Performed by: PHYSICIAN ASSISTANT

## 2022-06-07 RX ORDER — ALBUTEROL SULFATE 90 UG/1
1 AEROSOL, METERED RESPIRATORY (INHALATION) EVERY 6 HOURS PRN
Qty: 18 G | Refills: 11 | Status: SHIPPED | OUTPATIENT
Start: 2022-06-07 | End: 2023-10-27

## 2022-06-07 RX ORDER — FLUTICASONE PROPIONATE 50 MCG
2 SPRAY, SUSPENSION (ML) NASAL DAILY
Qty: 18.2 ML | Refills: 11 | Status: SHIPPED | OUTPATIENT
Start: 2022-06-07 | End: 2023-10-27

## 2022-06-07 RX ORDER — CHLORHEXIDINE GLUCONATE ORAL RINSE 1.2 MG/ML
SOLUTION DENTAL
COMMUNITY
Start: 2022-05-26 | End: 2023-10-27

## 2022-06-07 ASSESSMENT — ENCOUNTER SYMPTOMS
HEMATOCHEZIA: 0
FREQUENCY: 0
COUGH: 0
DIARRHEA: 0
HEMATURIA: 0
DIZZINESS: 0
SORE THROAT: 0
EYE PAIN: 0
PALPITATIONS: 0
ARTHRALGIAS: 0
FEVER: 0
HEADACHES: 0
MYALGIAS: 0
DYSURIA: 0
NERVOUS/ANXIOUS: 0
NAUSEA: 0
ABDOMINAL PAIN: 0
JOINT SWELLING: 0
HEARTBURN: 0
CHILLS: 0
WEAKNESS: 0
SHORTNESS OF BREATH: 0
CONSTIPATION: 0
PARESTHESIAS: 0
BREAST MASS: 0

## 2022-06-07 ASSESSMENT — PATIENT HEALTH QUESTIONNAIRE - PHQ9
SUM OF ALL RESPONSES TO PHQ QUESTIONS 1-9: 3
10. IF YOU CHECKED OFF ANY PROBLEMS, HOW DIFFICULT HAVE THESE PROBLEMS MADE IT FOR YOU TO DO YOUR WORK, TAKE CARE OF THINGS AT HOME, OR GET ALONG WITH OTHER PEOPLE: NOT DIFFICULT AT ALL
SUM OF ALL RESPONSES TO PHQ QUESTIONS 1-9: 3

## 2022-06-07 ASSESSMENT — ASTHMA QUESTIONNAIRES: ACT_TOTALSCORE: 24

## 2022-06-07 ASSESSMENT — PAIN SCALES - GENERAL: PAINLEVEL: NO PAIN (0)

## 2022-06-07 NOTE — LETTER
My Asthma Action Plan    Name: Vikki Carrizales   YOB: 1965  Date: 6/7/2022   My doctor: Tia Magallanes PA-C   My clinic: [facility]        My Rescue Medicine:   Albuterol inhaler (Proair/Ventolin/Proventil HFA)  2-4 puffs EVERY 4 HOURS as needed. Use a spacer if recommended by your provider.   My Asthma Severity:   Intermittent / Exercise Induced  Know your asthma triggers: humidity and exercise or sports             GREEN ZONE   Good Control    I feel good    No cough or wheeze    Can work, sleep and play without asthma symptoms       Take your asthma control medicine every day.     1. If exercise triggers your asthma, take your rescue medication    15 minutes before exercise or sports, and    During exercise if you have asthma symptoms  2. Spacer to use with inhaler: If you have a spacer, make sure to use it with your inhaler             YELLOW ZONE Getting Worse  I have ANY of these:    I do not feel good    Cough or wheeze    Chest feels tight    Wake up at night   1. Keep taking your Green Zone medications  2. Start taking your rescue medicine:    every 20 minutes for up to 1 hour. Then every 4 hours for 24-48 hours.  3. If you stay in the Yellow Zone for more than 12-24 hours, contact your doctor.  4. If you do not return to the Green Zone in 12-24 hours or you get worse, start taking your oral steroid medicine if prescribed by your provider.           RED ZONE Medical Alert - Get Help  I have ANY of these:    I feel awful    Medicine is not helping    Breathing getting harder    Trouble walking or talking    Nose opens wide to breathe       1. Take your rescue medicine NOW  2. If your provider has prescribed an oral steroid medicine, start taking it NOW  3. Call your doctor NOW  4. If you are still in the Red Zone after 20 minutes and you have not reached your doctor:    Take your rescue medicine again and    Call 911 or go to the emergency room right away    See your  regular doctor within 2 weeks of an Emergency Room or Urgent Care visit for follow-up treatment.          Annual Reminders:  Meet with Asthma Educator,  Flu Shot in the Fall, consider Pneumonia Vaccination for patients with asthma (aged 19 and older).    Pharmacy:    [id number] - [place], [place] - [address]  AT [facility] [address]  [id number] - [place], [place] - [address] AT [facility] [address]    Electronically signed by Tia Magallanes PA-C   Date: 06/07/22                    Asthma Triggers  How To Control Things That Make Your Asthma Worse    Triggers are things that make your asthma worse.  Look at the list below to help you find your triggers and   what you can do about them. You can help prevent asthma flare-ups by staying away from your triggers.      Trigger                                                          What you can do   Cigarette Smoke  Tobacco smoke can make asthma worse. Do not allow smoking in your home, car or around you.  Be sure no one smokes at a child s day care or school.  If you smoke, ask your health care provider for ways to help you quit.  Ask family members to quit too.  Ask your health care provider for a referral to Quit Plan to help you quit smoking, or call 4-644-245-PLAN.     Colds, Flu, Bronchitis  These are common triggers of asthma. Wash your hands often.  Don t touch your eyes, nose or mouth.  Get a flu shot every year.     Dust Mites  These are tiny bugs that live in cloth or carpet. They are too small to see. Wash sheets and blankets in hot water every week.   Encase pillows and mattress in dust mite proof covers.  Avoid having carpet if you can. If you have carpet, vacuum weekly.   Use a dust mask and HEPA vacuum.   Pollen and Outdoor Mold  Some people are allergic to trees, grass, or weed pollen, or molds. Try to keep your windows closed.  Limit time out doors when pollen count is high.   Ask you health care provider  about taking medicine during allergy season.     Animal Dander  Some people are allergic to skin flakes, urine or saliva from pets with fur or feathers. Keep pets with fur or feathers out of your home.    If you can t keep the pet outdoors, then keep the pet out of your bedroom.  Keep the bedroom door closed.  Keep pets off cloth furniture and away from stuffed toys.     Mice, Rats, and Cockroaches  Some people are allergic to the waste from these pests.   Cover food and garbage.  Clean up spills and food crumbs.  Store grease in the refrigerator.   Keep food out of the bedroom.   Indoor Mold  This can be a trigger if your home has high moisture. Fix leaking faucets, pipes, or other sources of water.   Clean moldy surfaces.  Dehumidify basement if it is damp and smelly.   Smoke, Strong Odors, and Sprays  These can reduce air quality. Stay away from strong odors and sprays, such as perfume, powder, hair spray, paints, smoke incense, paint, cleaning products, candles and new carpet.   Exercise or Sports  Some people with asthma have this trigger. Be active!  Ask your doctor about taking medicine before sports or exercise to prevent symptoms.    Warm up for 5-10 minutes before and after sports or exercise.     Other Triggers of Asthma  Cold air:  Cover your nose and mouth with a scarf.  Sometimes laughing or crying can be a trigger.  Some medicines and food can trigger asthma.

## 2022-06-07 NOTE — PATIENT INSTRUCTIONS
"Healthy Strategies  Eat at least 3 meals a day and never skip breakfast.  Eat more slowly.  Decrease portion size.  Provide structure by using meal replacement bars or shakes, and/or low calorie frozen meals.  For good nutrition incorporate fruit, vegetables, whole grains, lean protein, and low-fat dairy.  Remove trigger foods from yourenvironment to avoid impulse eating.  Increase physical activity: get a pedometer and aim for 10,000 steps a day or 30-35 minutes of activity 5 days per week.  Weigh yourself daily or at least weekly.  Keep a record of what you eat and your activity.  Establish a support system such as afriend, group or program.    11. Read Clay Wasserman's \"Eat to Live\". Remember it is important to have a minimum of 1200 calories a day, okay to use olive oil, 40 grams of fiber daily. No more than two servings (the size of your palm) of red meat a week.     Please consider the following general health recommendations:    Eat a quality diet (generally, low in simple sugars, starches, cholesterol and saturated fat.)    Please get 1200 mg of calcium in divided doses with 800 units vitamin D in your diet daily. Take supplements as needed to obtain full recommended amounts.     Stay physically active. Regular walking or other exercise is one of the best ways to minimize pain of arthritis; maintain independence and mobility; maintain bone strength; maintain conditioning of your heart. Find something you enjoy and a friend to do it with you.    Maintain ideal weight. Your Body mass index is Body mass index is 30.35 kg/m .. Generally a BMI of 20-25 is considered ideal. Overweight is defined as 25-30, obese is 30-35 and markedly obese is greater than 35.    Apply sun block (SPF 25 or greater) on exposed skin anytime you are out in the sun to prevent skin cancer.     Wear a seatbelt whenever you are in a car.    Obtain a flu shot every fall.    You should have a tetanus booster at least once every 10 " years.    Schedule a mammogram annually starting at the age of 40 years old unless recommended earlier by your primary care provider. Come for a general exam once yearly.    Colonoscopy (an exam of the colon) is recommended every 10 years after the age of 45 to screen for colon cancer. (More often if you are at increased risk.)    A vaccine to reduce your chances of getting shingles is available if you are over 50. Information about the vaccine is available through the clinic or at:  (https://www.cdc.gov/vaccines/vpd/shingles/public/shingrix/index.html)    Check blood sugar annually. Cholesterol annually unless you have had a normal level when last checked within 5 years.     I recommend that you have a general physical exam every year. You should have a pap test every 3 years between the ages of 21 and 30 and every 3-5 years between the ages of 30 and 65 depending on your test unless you have had previous abnormal pap smears, (in these cases the exams and PAP's should be done on a schedule as recommended by your primary care provider). If you have had hysterectomy in the past, your future Pap plan may be different.    Preventive Health Recommendations  Female Ages 50 - 64    Yearly exam: See your health care provider every year in order to  Review health changes.   Discuss preventive care.    Review your medicines if your doctor has prescribed any.    Get a Pap test every three years (unless you have an abnormal result and your provider advises testing more often).  If you get Pap tests with HPV test, you only need to test every 5 years, unless you have an abnormal result.   You do not need a Pap test if your uterus was removed (hysterectomy) and you have not had cancer.  You should be tested each year for STDs (sexually transmitted diseases) if you're at risk.   Have a mammogram every 1 to 2 years.  Have a colonoscopy at age 50, or have a yearly FIT test (stool test). These exams screen for colon cancer.    Have a  cholesterol test every 5 years, or more often if advised.  Have a diabetes test (fasting glucose) every three years. If you are at risk for diabetes, you should have this test more often.   If you are at risk for osteoporosis (brittle bone disease), think about having a bone density scan (DEXA).    Shots: Get a flu shot each year. Get a tetanus shot every 10 years.    Nutrition:   Eat at least 5 servings of fruits and vegetables each day.  Eat whole-grain bread, whole-wheat pasta and brown rice instead of white grains and rice.  Get adequate Calcium and Vitamin D.     Lifestyle  Exercise at least 150 minutes a week (30 minutes a day, 5 days a week). This will help you control your weight and prevent disease.  Limit alcohol to one drink per day.  No smoking.   Wear sunscreen to prevent skin cancer.   See your dentist every six months for an exam and cleaning.  See your eye doctor every 1 to 2 years.

## 2022-06-07 NOTE — NURSING NOTE
Pt presents to clinic today for a physical. Patient has some concerns with sexual dysfunction, painful sex that's been ongoing for 1-2 years.       FOOD SECURITY SCREENING QUESTIONS:    The next two questions are to help us understand your food security.  If you are feeling you need any assistance in this area, we have resources available to support you today.    Hunger Vital Signs:  Within the past 12 months we worried whether our food would run out before we got money to buy more. Never  Within the past 12 months the food we bought just didn't last and we didn't have money to get more. Never          Medication Reconciliation: complete  Sajan Bob, JAMES,LPN on 6/7/2022 at 8:51 AM

## 2022-06-07 NOTE — PROGRESS NOTES
SUBJECTIVE:   CC: Vikki Carrizales is an 56 year old woman who presents for preventive health visit.       Patient has been advised of split billing requirements and indicates understanding: Yes  Healthy Habits:     Getting at least 3 servings of Calcium per day:  Yes    Bi-annual eye exam:  Yes    Dental care twice a year:  Yes    Sleep apnea or symptoms of sleep apnea:  None    Diet:  Regular (no restrictions)    Frequency of exercise:  None    Taking medications regularly:  Not Applicable    Medication side effects:  Not applicable    PHQ-2 Total Score: 0    Additional concerns today:  Yes      No LMP recorded (lmp unknown). Patient is postmenopausal.   Contraception: postmenopausal  Risk for STI?: none  Last pap: 3/25/2019 - normal pap and HPV, repeat in 5 years  Any hx of abnormal paps:  none  FH of early CA?: none  Cholesterol/DM concerns/screenin, would like rechecked  Tobacco?: none  Lung cancer screening: na  Calcium intake: yes  DEXA: none  Last mammo: 3/25/2019  Colonoscopy: 2019, repeat in 5 years  Hepatitis C screen: 3/26/2019 - negative  HIV screen: na   Immunizations: Needs pneumonia vaccine updated    Patient has been struggling with painful intercourse.  Patient is postmenopausal.  Symptoms have been happening over the last 2 years.  Has tried lubrication however this has not helped.  Interested in seeing a specialist to talk about hormone therapy.    Today's PHQ-2 Score:   PHQ-2 (  [employer]) 2022   Q1: Little interest or pleasure in doing things 0   Q2: Feeling down, depressed or hopeless 0   PHQ-2 Score 0   PHQ-2 Total Score (12-17 Years)- Positive if 3 or more points; Administer PHQ-A if positive -   Q1: Little interest or pleasure in doing things Not at all   Q2: Feeling down, depressed or hopeless Not at all   PHQ-2 Score 0       Answers for HPI/ROS submitted by the patient on 2022  If you checked off any problems, how difficult have these problems made it for you to do  your work, take care of things at home, or get along with other people?: Not difficult at all  PHQ9 TOTAL SCORE: 3    Abuse: Current or Past (Physical, Sexual or Emotional) - No  Do you feel safe in your environment? Yes    Have you ever done Advance Care Planning? (For example, a Health Directive, POLST, or a discussion with a medical provider or your loved ones about your wishes): No, advance care planning information given to patient to review.  Patient declined advance care planning discussion at this time.    Social History     Tobacco Use     Smoking status: Never Smoker     Smokeless tobacco: Never Used   Substance Use Topics     Alcohol use: Yes     Comment: moderate, about 1 bottle of wine per week         Alcohol Use 2022   Prescreen: >3 drinks/day or >7 drinks/week? No       Reviewed orders with patient.  Reviewed health maintenance and updated orders accordingly - Yes  Labs reviewed in EPIC  BP Readings from Last 3 Encounters:   22 129/84   21 122/78   20 116/78    Wt Readings from Last 3 Encounters:   22 79.7 kg (175 lb 9.6 oz)   21 79.8 kg (176 lb)   20 80.6 kg (177 lb 12.8 oz)                  Patient Active Problem List   Diagnosis     Allergic rhinitis     Anemia, iron deficiency     Asthma     Depression, major, recurrent (H)     Genital herpes     Esophageal reflux     Restless leg syndrome     Non morbid obesity due to excess calories     Thrombophlebitis leg     Venous insufficiency     Vitamin B12 deficiency     Right ear pain     Past Surgical History:   Procedure Laterality Date      SECTION      ,,HELLP syndrome, Platelet transfusions; son       SECTION           COLONOSCOPY  2019    Normal exam, 5 year follow up per Dr. Yadav     COLONOSCOPY N/A 2019    Procedure: COLONOSCOPY;  Surgeon: Miguel A Yadav MD;  Location:  OR     ESOPHAGOSCOPY, GASTROSCOPY, DUODENOSCOPY (EGD), COMBINED N/A 2019     Procedure: ESOPHAGOGASTRODUODENOSCOPY (EGD);  Surgeon: Miguel A Yadav MD;  Location: [facility] OR     GASTRIC BYPASS  2002     LAPAROSCOPIC BYPASS GASTRIC      2002, Lenore en Y Gastric Bypass, Dr Gabriel Torres, [facility]     MOUTH SURGERY       [facility] ORAL SURGERY PROCEDURE         Social History     Tobacco Use     Smoking status: Never Smoker     Smokeless tobacco: Never Used   Substance Use Topics     Alcohol use: Yes     Comment: moderate, about 1 bottle of wine per week     Family History   Problem Relation Age of Onset     Other - See Comments Mother      Aortic aneurysm Father      Arrhythmia Father      Diabetes Maternal Grandmother         Diabetes     Other - See Comments Maternal Grandmother         Stroke     Other - See Comments Maternal Grandfather         Depression     Osteoporosis Maternal Grandfather         Osteoporosis     Diabetes Maternal Aunt         Diabetes     Other - See Comments Sister         Thrombophlebitis     Heart Disease Brother         Heart Disease     Breast Cancer No family hx of         Cancer-breast         Current Outpatient Medications   Medication Sig Dispense Refill     albuterol (PROAIR HFA/PROVENTIL HFA/VENTOLIN HFA) 108 (90 Base) MCG/ACT inhaler Inhale 1 puff into the lungs every 6 hours as needed for shortness of breath / dyspnea or wheezing 18 g 11     fluticasone (FLONASE) 50 MCG/ACT nasal spray Spray 2 sprays in nostril daily 18.2 mL 11     acetaminophen (TYLENOL) 500 MG tablet Take 500-1,000 mg by mouth every 6 hours as needed for mild pain       chlorhexidine (PERIDEX) 0.12 % solution SWISH AND SPIT 15MLS BY MOUTH FOR 2 MINUTES TWICE DAILY       ibuprofen (ADVIL/MOTRIN) 400 MG tablet Take 400 mg by mouth every 6 hours as needed for moderate pain       Allergies   Allergen Reactions     Seasonal Allergies      seasonal     Recent Labs   Lab Test 06/07/22  0944 03/22/19  1553 04/10/17  1733 08/26/14  1210   * 80  --  82   HDL 63 55  --  61   TRIG  70 73  --  74   ALT 18 16  --   --    CR 0.76 0.81 0.80  --    GFRESTIMATED >90 74  --   --    GFRESTBLACK  --  90 >60  --    POTASSIUM 4.2 4.0 3.8  --         Breast Cancer Screening:  Any new diagnosis of family breast, ovarian, or bowel cancer? No    FHS-7: No flowsheet data found.      Pertinent mammograms are reviewed under the imaging tab.    History of abnormal Pap smear: NO - age 30-65 PAP every 5 years with negative HPV co-testing recommended  PAP / HPV Latest Ref Rng & Units 3/25/2019   PAP (Historical) - NIL   HPV16 NEG:Negative Negative   HPV18 NEG:Negative Negative   HRHPV NEG:Negative Negative     Reviewed and updated as needed this visit by clinical staff   Tobacco  Allergies  Meds  Problems  Med Hx  Surg Hx  Fam Hx  Soc   Hx          Reviewed and updated as needed this visit by Provider   Tobacco  Allergies  Meds  Problems  Med Hx  Surg Hx  Fam Hx           Past Medical History:   Diagnosis Date     Anemia      Anxiety      Cutaneous abscess     2018     Gastro-esophageal reflux disease without esophagitis          Herpesviral infection of urogenital system     No Comments Provided     Impetigo     recurrent - as a child     Major depressive disorder, single episode     ,Rx: zoloft, citalopram and prozac in the past, none since )     Other seasonal allergic rhinitis     lifelong     Personal history of other medical treatment (CODE)      1 child  in childbirth (); one living child (b )     Uncomplicated asthma          Vitamin D deficiency     No Comments Provided      Past Surgical History:   Procedure Laterality Date      SECTION      ,,HELLP syndrome, Platelet transfusions; son       SECTION           COLONOSCOPY  2019    Normal exam, 5 year follow up per Dr. Yadav     COLONOSCOPY N/A 2019    Procedure: COLONOSCOPY;  Surgeon: Miguel A Yadav MD;  Location:  OR     ESOPHAGOSCOPY, GASTROSCOPY,  DUODENOSCOPY (EGD), COMBINED N/A 2019    Procedure: ESOPHAGOGASTRODUODENOSCOPY (EGD);  Surgeon: Miguel A Yadav MD;  Location: [facility] OR     GASTRIC BYPASS  2002     LAPAROSCOPIC BYPASS GASTRIC      2002, [place] en Y Gastric Bypass, Dr Gabriel Torres, [facility]     MOUTH SURGERY       [facility] ORAL SURGERY PROCEDURE       OB History    Para Term  AB Living   3 2 1 1 1 1   SAB IAB Ectopic Multiple Live Births   0 0 0 0 0      # Outcome Date GA Lbr Ricardo/2nd Weight Sex Delivery Anes PTL Lv   3 AB            2             1 Term                Review of Systems   Constitutional: Negative for chills and fever.   HENT: Negative for congestion, ear pain, hearing loss and sore throat.    Eyes: Negative for pain and visual disturbance.   Respiratory: Negative for cough and shortness of breath.    Cardiovascular: Negative for chest pain, palpitations and peripheral edema.   Gastrointestinal: Negative for abdominal pain, constipation, diarrhea, heartburn, hematochezia and nausea.   Breasts:  Negative for tenderness, breast mass and discharge.   Genitourinary: Negative for dysuria, frequency, genital sores, hematuria, pelvic pain, urgency, vaginal bleeding and vaginal discharge.   Musculoskeletal: Negative for arthralgias, joint swelling and myalgias.   Skin: Negative for rash.   Neurological: Negative for dizziness, weakness, headaches and paresthesias.   Psychiatric/Behavioral: Negative for mood changes. The patient is not nervous/anxious.      CONSTITUTIONAL: NEGATIVE for fever, chills, change in weight  INTEGUMENTARY/SKIN: NEGATIVE for worrisome rashes, moles or lesions  EYES: NEGATIVE for vision changes or irritation  ENT: NEGATIVE for ear, mouth and throat problems  RESP: NEGATIVE for significant cough or SOB  BREAST: NEGATIVE for masses, tenderness or discharge  CV: NEGATIVE for chest pain, palpitations or peripheral edema  GI: NEGATIVE for nausea, abdominal pain, heartburn, or change in  "bowel habits  : NEGATIVE for unusual urinary or vaginal symptoms. No vaginal bleeding.  MUSCULOSKELETAL: NEGATIVE for significant arthralgias or myalgia  NEURO: NEGATIVE for weakness, dizziness or paresthesias  PSYCHIATRIC: NEGATIVE for changes in mood or affect      OBJECTIVE:   /84 (BP Location: Right arm, Patient Position: Sitting, Cuff Size: Adult Large)   Pulse 83   Temp 97.2  F (36.2  C) (Tympanic)   Resp 18   Ht 1.62 m (5' 3.78\")   Wt 79.7 kg (175 lb 9.6 oz)   LMP  (LMP Unknown)   SpO2 97%   Breastfeeding No   BMI 30.35 kg/m    Physical Exam  GENERAL APPEARANCE: healthy, alert and no distress  EYES: Eyes grossly normal to inspection, PERRL and conjunctivae and sclerae normal  HENT: ear canals and TM's normal, nose and mouth without ulcers or lesions, oropharynx clear and oral mucous membranes moist  NECK: no adenopathy, no asymmetry, masses, or scars and thyroid normal to palpation  RESP: lungs clear to auscultation - no rales, rhonchi or wheezes  CV: regular rate and rhythm, normal S1 S2, no S3 or S4, no murmur, click or rub, no peripheral edema and peripheral pulses strong  ABDOMEN: soft, nontender, no hepatosplenomegaly, no masses and bowel sounds normal  MS: no musculoskeletal defects are noted and gait is age appropriate without ataxia  SKIN: no suspicious lesions or rashes  NEURO: Normal strength and tone, sensory exam grossly normal, mentation intact and speech normal  PSYCH: mentation appears normal and affect normal/bright    Diagnostic Test Results:  Labs reviewed in Epic    ASSESSMENT/PLAN:       ICD-10-CM    1. Routine history and physical examination of adult  Z00.00    2. Uncomplicated asthma, unspecified asthma severity, unspecified whether persistent  J45.909 albuterol (PROAIR HFA/PROVENTIL HFA/VENTOLIN HFA) 108 (90 Base) MCG/ACT inhaler     fluticasone (FLONASE) 50 MCG/ACT nasal spray     CBC and Differential     Comprehensive Metabolic Panel     Comprehensive Metabolic Panel "     CBC and Differential   3. Screening cholesterol level  Z13.220 Lipid Panel     Lipid Panel   4. Screening for diabetes mellitus  Z13.1 Comprehensive Metabolic Panel     Comprehensive Metabolic Panel   5. Encounter for screening mammogram for malignant neoplasm of breast  Z12.31 MA Screen Bilateral w/Silviano   6. Vitamin B12 deficiency  E53.8 Vitamin B12     Iron Binding Panel     Ferritin     Ferritin     Iron Binding Panel     Vitamin B12   7. History of gastric bypass  Z98.84 CBC and Differential     Comprehensive Metabolic Panel     Comprehensive Metabolic Panel     CBC and Differential   8. Need for vaccination for pneumococcus  Z23 PNEUMOCOCCAL 20 VALENT CONJUGATE (PREVNAR 20)   9. Other iron deficiency anemia  D50.8 CBC and Differential     Comprehensive Metabolic Panel     Iron Binding Panel     Ferritin     Ferritin     Iron Binding Panel     Comprehensive Metabolic Panel     CBC and Differential   10. Skin lesion  L98.9 Adult General Surg Referral   11. Pain in female genitalia on intercourse  N94.10 Ob/Gyn Referral     Pain with intercourse: Referred to OB/GYN for consult.  Discussed possible hormone replacements.  We will discuss medications with OB/GYN.    Referred to general surgery for consult due to her multiple skin lesions on her chest and back.    History of gastric bypass, vitamin B12 deficiency, and anemia: Complete labs for monitoring.  Labs are pending.  Encourage continued good diet and exercise.    Patient was given Prevnar 20 to complete her pneumonia vaccine series.    Complete lipid panel and CMP for cholesterol and diabetes mellitus screening.    Asthma: Refilled albuterol inhaler.  No acute concerns at this time.  Refilled Flonase nasal spray.    Repeat physical in 1 year.    Patient has been advised of split billing requirements and indicates understanding: Yes    COUNSELING:  Reviewed preventive health counseling, as reflected in patient instructions       Regular exercise        "Healthy diet/nutrition       Vision screening       Hearing screening       Osteoporosis prevention/bone health       Safe sex practices/STD prevention       Colorectal Cancer Screening    Estimated body mass index is 30.35 kg/m  as calculated from the following:    Height as of this encounter: 1.62 m (5' 3.78\").    Weight as of this encounter: 79.7 kg (175 lb 9.6 oz).        She reports that she has never smoked. She has never used smokeless tobacco.      Counseling Resources:  ATP IV Guidelines  Pooled Cohorts Equation Calculator  Breast Cancer Risk Calculator  BRCA-Related Cancer Risk Assessment: FHS-7 Tool  FRAX Risk Assessment  ICSI Preventive Guidelines  Dietary Guidelines for Americans, 2010  USDA's MyPlate  ASA Prophylaxis  Lung CA Screening    Tia Magallanes PA-C  [facility]  "

## 2022-06-09 DIAGNOSIS — D72.819 LEUKOPENIA, UNSPECIFIED TYPE: Primary | ICD-10-CM

## 2022-06-09 DIAGNOSIS — R74.8 ELEVATED ALKALINE PHOSPHATASE LEVEL: ICD-10-CM

## 2022-06-12 ENCOUNTER — MYC MEDICAL ADVICE (OUTPATIENT)
Dept: FAMILY MEDICINE | Facility: OTHER | Age: 57
End: 2022-06-12
Payer: COMMERCIAL

## 2022-06-12 DIAGNOSIS — Z98.84 HISTORY OF GASTRIC BYPASS: ICD-10-CM

## 2022-06-12 DIAGNOSIS — E53.8 VITAMIN B12 DEFICIENCY: Primary | ICD-10-CM

## 2022-06-12 DIAGNOSIS — D50.8 OTHER IRON DEFICIENCY ANEMIA: ICD-10-CM

## 2022-06-13 RX ORDER — LANOLIN ALCOHOL/MO/W.PET/CERES
1000 CREAM (GRAM) TOPICAL DAILY
Qty: 90 TABLET | Refills: 3 | Status: SHIPPED | OUTPATIENT
Start: 2022-06-13 | End: 2023-10-27

## 2022-06-13 RX ORDER — FERROUS SULFATE 325(65) MG
325 TABLET ORAL
Qty: 90 TABLET | Refills: 3 | Status: SHIPPED | OUTPATIENT
Start: 2022-06-13 | End: 2023-08-15

## 2022-06-20 ENCOUNTER — OFFICE VISIT (OUTPATIENT)
Dept: SURGERY | Facility: OTHER | Age: 57
End: 2022-06-20
Attending: PHYSICIAN ASSISTANT
Payer: COMMERCIAL

## 2022-06-20 VITALS
SYSTOLIC BLOOD PRESSURE: 118 MMHG | OXYGEN SATURATION: 97 % | HEART RATE: 97 BPM | RESPIRATION RATE: 18 BRPM | DIASTOLIC BLOOD PRESSURE: 70 MMHG | WEIGHT: 174.8 LBS | TEMPERATURE: 98.4 F | BODY MASS INDEX: 30.21 KG/M2

## 2022-06-20 DIAGNOSIS — L98.9 SKIN LESION: ICD-10-CM

## 2022-06-20 PROCEDURE — 12031 INTMD RPR S/A/T/EXT 2.5 CM/<: CPT | Performed by: SURGERY

## 2022-06-20 PROCEDURE — 11402 EXC TR-EXT B9+MARG 1.1-2 CM: CPT | Performed by: SURGERY

## 2022-06-20 PROCEDURE — 88305 TISSUE EXAM BY PATHOLOGIST: CPT

## 2022-06-20 PROCEDURE — G0463 HOSPITAL OUTPT CLINIC VISIT: HCPCS

## 2022-06-20 ASSESSMENT — PAIN SCALES - GENERAL: PAINLEVEL: NO PAIN (0)

## 2022-06-20 NOTE — NURSING NOTE
"Chief Complaint   Patient presents with     Procedure     Lesion on back       Initial /70 (BP Location: Left arm, Patient Position: Sitting, Cuff Size: Adult Large)   Pulse 97   Temp 98.4  F (36.9  C) (Tympanic)   Resp 18   Wt 79.3 kg (174 lb 12.8 oz)   LMP  (LMP Unknown)   SpO2 97%   Breastfeeding No   BMI 30.21 kg/m   Estimated body mass index is 30.21 kg/m  as calculated from the following:    Height as of 6/7/22: 1.62 m (5' 3.78\").    Weight as of this encounter: 79.3 kg (174 lb 12.8 oz).  Medication Reconciliation: complete    Cynthia King LPN  "

## 2022-06-20 NOTE — PATIENT INSTRUCTIONS
Your incision was closed with stitches that will dissolve.    It is ok to get the incision wet in the shower on the day after your procedure.     Don't soak in a tub, pool or lake for 1 week. Please call with any questions or concerns.

## 2022-06-20 NOTE — NURSING NOTE
"Chief Complaint   Patient presents with     Procedure     Lesion on back       Initial /70 (BP Location: Left arm, Patient Position: Sitting, Cuff Size: Adult Large)   Pulse 97   Temp 98.4  F (36.9  C) (Tympanic)   Resp 18   Wt 79.3 kg (174 lb 12.8 oz)   LMP  (LMP Unknown)   SpO2 97%   Breastfeeding No   BMI 30.21 kg/m   Estimated body mass index is 30.21 kg/m  as calculated from the following:    Height as of 6/7/22: 1.62 m (5' 3.78\").    Weight as of this encounter: 79.3 kg (174 lb 12.8 oz).  Medication Reconciliation: complete    Cynthia King LPN     TIMEOUT  [place] Protocol    A. Pre-procedure verification complete     1-relevant information / documentation available, reviewed and properly matched to the patient; 2-consent accurate and complete, 3-equipment and supplies available    B. Site marking complete     Site marked if not in continuous attendance with patient    C. TIME OUT completed     Time Out was conducted just prior to starting procedure to verify the eight required elements: 1-patient identity, 2-consent accurate and complete, 3-position, 4-correct side/site marked (if applicable), 5-procedure, 6-relevant images / results properly labeled and displayed (if applicable), 7-antibiotics / irrigation fluids (if applicable), 8-safety precautions.  "

## 2022-06-20 NOTE — PROGRESS NOTES
Procedure Note     Pre/Post Operative Diagnosis:   Skin lesion of upper back    Procedure:    Excision of Skin lesion of upper back    Surgeon: MALI Yadav MD     Local Anesthesia: 1% lidocaine with0.25%Marcaine with epinephrine    Indication for the procedure:    This is a 56 year old female patient with Skin lesion of upper back.  The patient has numerous skin lesions on her back.  She denies being able to feel them or have any problems with them such as bleeding or drainage.  She states that her primary care doctor noted some possible abnormalities at her last exam.  She is here to follow them up.  Clinically, there are numerous skin lesions on the back.  Scattered very small hemangiomata as well as seborrheic keratoses that vary in size and color.  Smallest seborrheic keratosis 2 to 3 mm, largest seborrheic keratosis is 1-1/2 cm.  There is what appears to be solar lentigo scattered across the upper back as well.  There is one 3 to 4 mm nodular skin lesion on the upper back that seems slightly different than the other skin lesions she has.  We will plan for excision of this.  After explaining the risks to include bleeding, infection, recurrence or need for re-excision, and scarring the patient wished to proceed.    Procedure:   The area was prepped and draped in usual sterile fashion with ChloraPrep. After adequate local anesthesia, an elliptical skin incision was made to encompass the lesion, 1.6cm x 0.6 cm with margins. The subcutaneous tissues were reapproximated with 3-0 vicryl in inverted interrupted fashion. The skin was closed with 4-0 monocryl suture in a running fashion.  Dermabond was applied.     Plan:  The patient will be called with pathology results.  Patient will followup if there any problems with the wound including redness or drainage.  Patient is encouraged to follow-up with primary care doctor for at least once yearly full body skin exam or have somebody at home occasionally check her back  for abnormal skin lesions.      MALI Yadav MD

## 2022-06-22 LAB
PATH REPORT.COMMENTS IMP SPEC: NORMAL
PATH REPORT.FINAL DX SPEC: NORMAL
PHOTO IMAGE: NORMAL

## 2022-07-05 ENCOUNTER — HOSPITAL ENCOUNTER (OUTPATIENT)
Dept: MAMMOGRAPHY | Facility: OTHER | Age: 57
Discharge: HOME | End: 2022-07-05
Attending: PHYSICIAN ASSISTANT
Payer: COMMERCIAL

## 2022-07-05 ENCOUNTER — OFFICE VISIT (OUTPATIENT)
Dept: OBGYN | Facility: OTHER | Age: 57
End: 2022-07-05
Attending: PHYSICIAN ASSISTANT
Payer: COMMERCIAL

## 2022-07-05 VITALS
BODY MASS INDEX: 29.73 KG/M2 | HEART RATE: 76 BPM | WEIGHT: 172 LBS | DIASTOLIC BLOOD PRESSURE: 78 MMHG | SYSTOLIC BLOOD PRESSURE: 138 MMHG

## 2022-07-05 DIAGNOSIS — N94.10 FEMALE DYSPAREUNIA: Primary | ICD-10-CM

## 2022-07-05 DIAGNOSIS — N94.10 PAIN IN FEMALE GENITALIA ON INTERCOURSE: ICD-10-CM

## 2022-07-05 DIAGNOSIS — Z12.31 ENCOUNTER FOR SCREENING MAMMOGRAM FOR MALIGNANT NEOPLASM OF BREAST: ICD-10-CM

## 2022-07-05 PROCEDURE — 99203 OFFICE O/P NEW LOW 30 MIN: CPT | Performed by: OBSTETRICS & GYNECOLOGY

## 2022-07-05 PROCEDURE — 77067 SCR MAMMO BI INCL CAD: CPT

## 2022-07-05 PROCEDURE — G0463 HOSPITAL OUTPT CLINIC VISIT: HCPCS

## 2022-07-05 RX ORDER — ESTRADIOL 0.1 MG/G
CREAM VAGINAL
Qty: 42 G | Refills: 3 | Status: SHIPPED | OUTPATIENT
Start: 2022-07-05 | End: 2023-09-20

## 2022-07-05 ASSESSMENT — PAIN SCALES - GENERAL: PAINLEVEL: NO PAIN (0)

## 2022-07-05 NOTE — PROGRESS NOTES
CC: painful intercourse  HPI:  Vikki is a 56 year old female who presents for evaluation of painful intercourse.  She has noticed painful intercourse for about a year.  She has been menopausal for about 3 years now.  She has had no irregular bleeding.  Her Pap smears are up-to-date and have been normal.  It seems to be insertional pain.  She denies pain with deep thrust.  She denies any bowel or bladder issues.  It is only painful during intercourse.  She does not bleed from intercourse.  She has been and is still able to achieve orgasm.  She denies history of stroke heart attack, DVT, or cancer of the uterus or breast.    She has had 2 deliveries both of which were .  She has 1 living child.  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    No LMP recorded (lmp unknown). Patient is postmenopausal.      OB History    Para Term  AB Living   3 2 1 1 1 1   SAB IAB Ectopic Multiple Live Births   0 0 0 0 0      # Outcome Date GA Lbr Ricardo/2nd Weight Sex Delivery Anes PTL Lv   3 AB            2             1 Term              Past Medical History:   Diagnosis Date     Anemia      Anxiety      Cutaneous abscess     2018     Gastro-esophageal reflux disease without esophagitis     2003     Herpesviral infection of urogenital system     No Comments Provided     Impetigo     recurrent - as a child     Major depressive disorder, single episode     ,Rx: zoloft, citalopram and prozac in the past, none since )     Other seasonal allergic rhinitis     lifelong     Personal history of other medical treatment (CODE)      1 child  in childbirth (); one living child (1996)     Uncomplicated asthma          Vitamin D deficiency     No Comments Provided     Past Surgical History:   Procedure Laterality Date      SECTION      ,,HELLP syndrome, Platelet transfusions; son       SECTION            COLONOSCOPY  11/05/2019    Normal exam, 5 year follow up per Dr. Yadav     COLONOSCOPY N/A 11/05/2019    Procedure: COLONOSCOPY;  Surgeon: Miguel A Yadav MD;  Location: [facility] OR     ESOPHAGOSCOPY, GASTROSCOPY, DUODENOSCOPY (EGD), COMBINED N/A 11/05/2019    Procedure: ESOPHAGOGASTRODUODENOSCOPY (EGD);  Surgeon: Miguel A Yadav MD;  Location: [facility] OR     GASTRIC BYPASS  2002     LAPAROSCOPIC BYPASS GASTRIC      2002, Lenore en Y Gastric Bypass, Dr Gabriel Torres, [facility]     MOUTH SURGERY       [facility] ORAL SURGERY PROCEDURE       Social History     Socioeconomic History     Marital status:      Spouse name: Not on file     Number of children: Not on file     Years of education: Not on file     Highest education level: Not on file   Occupational History     Not on file   Tobacco Use     Smoking status: Never Smoker     Smokeless tobacco: Never Used   Substance and Sexual Activity     Alcohol use: Yes     Comment: moderate, about 1 bottle of wine per week     Drug use: No     Sexual activity: Yes     Partners: Male     Birth control/protection: Male Surgical   Other Topics Concern     Parent/sibling w/ CABG, MI or angioplasty before 65F 55M? Not Asked   Social History Narrative     on 12/21/10 after 8 year relationship (1st marriage) - Vinay    1 son (Genaro rdz 4-18-96), from previous relationship.      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     Her mother moved back to  in April, 2011    Preload  9/10/2013    **pre upload 11/30/2012**     Social Determinants of Health     Financial Resource Strain: Not on file   Food Insecurity: Not on file   Transportation Needs: Not on file   Physical Activity: Not on file   Stress: Not on file   Social Connections: Not on file   Intimate Partner Violence: Not on file   Housing Stability: Not on file     Family History   Problem Relation Age of Onset     Other - See Comments Mother      Aortic aneurysm  Father      Arrhythmia Father      Diabetes Maternal Grandmother         Diabetes     Other - See Comments Maternal Grandmother         Stroke     Other - See Comments Maternal Grandfather         Depression     Osteoporosis Maternal Grandfather         Osteoporosis     Diabetes Maternal Aunt         Diabetes     Other - See Comments Sister         Thrombophlebitis     Heart Disease Brother         Heart Disease     Breast Cancer No family hx of         Cancer-breast       Current Outpatient Medications   Medication     acetaminophen (TYLENOL) 500 MG tablet     albuterol (PROAIR HFA/PROVENTIL HFA/VENTOLIN HFA) 108 (90 Base) MCG/ACT inhaler     chlorhexidine (PERIDEX) 0.12 % solution     cyanocobalamin (VITAMIN B-12) 1000 MCG tablet     ferrous sulfate (FEROSUL) 325 (65 Fe) MG tablet     fluticasone (FLONASE) 50 MCG/ACT nasal spray     ibuprofen (ADVIL/MOTRIN) 400 MG tablet     No current facility-administered medications for this visit.     Allergies   Allergen Reactions     Seasonal Allergies      seasonal     /78   Pulse 76   Wt 78 kg (172 lb)   LMP  (LMP Unknown)   BMI 29.73 kg/m      REVIEW OF SYSTEMS  Negative except as noted above    Exam:  Constitutional: No acute distress    Pelvic: Normal BUSE, vulva appears normal, vaginal and cervix are normal.  Uterus is normal size, shape position and mobility without adnexal mass or tenderness. Chaperone was present.  On bimanual exam the introitus appears narrowed but does permit a single digit without pain.  The skin appears normal without any evidence of lichen sclerosis.      Lab: No results found for any visits on 07/05/22.    ASSESSMENT/PLAN :  1. Pain in female genitalia on intercourse      We discussed that this is most likely genitourinary syndrome of menopause.  We will start her on vaginal estradiol half an applicator nightly for 2 weeks and then 3 times a week.  Prescription was sent to her pharmacy.  She is to call if things have not improved  within a month or so.  We also discussed use of vaginal dilators if needed.  For immediate relief we suggested use of lubrication.  She verbalized understanding and will notify us if her symptoms have not improved.    Fred Aranda MD FACOG  1:19 PM 7/5/2022

## 2022-08-01 ENCOUNTER — MYC MEDICAL ADVICE (OUTPATIENT)
Dept: FAMILY MEDICINE | Facility: OTHER | Age: 57
End: 2022-08-01

## 2022-08-01 NOTE — TELEPHONE ENCOUNTER
Needs to be seen. Please help her set up an appt.   FRANCISCO Kovacs.................8/1/2022 5:05 PM

## 2022-09-16 ENCOUNTER — MYC MEDICAL ADVICE (OUTPATIENT)
Dept: FAMILY MEDICINE | Facility: OTHER | Age: 57
End: 2022-09-16

## 2022-09-17 ENCOUNTER — HEALTH MAINTENANCE LETTER (OUTPATIENT)
Age: 57
End: 2022-09-17

## 2022-09-19 ENCOUNTER — LAB (OUTPATIENT)
Dept: LAB | Facility: OTHER | Age: 57
End: 2022-09-19
Attending: PHYSICIAN ASSISTANT
Payer: COMMERCIAL

## 2022-09-19 DIAGNOSIS — R74.8 ELEVATED ALKALINE PHOSPHATASE LEVEL: ICD-10-CM

## 2022-09-19 DIAGNOSIS — D72.819 LEUKOPENIA, UNSPECIFIED TYPE: ICD-10-CM

## 2022-09-19 LAB
ALBUMIN SERPL-MCNC: 4.1 G/DL (ref 3.5–5.7)
ALP SERPL-CCNC: 94 U/L (ref 34–104)
ALT SERPL W P-5'-P-CCNC: 15 U/L (ref 7–52)
AST SERPL W P-5'-P-CCNC: 21 U/L (ref 13–39)
BASOPHILS # BLD AUTO: 0.1 10E3/UL (ref 0–0.2)
BASOPHILS NFR BLD AUTO: 1 %
BILIRUB DIRECT SERPL-MCNC: 0.1 MG/DL (ref 0–0.2)
BILIRUB SERPL-MCNC: 0.3 MG/DL (ref 0.3–1)
EOSINOPHIL # BLD AUTO: 0.2 10E3/UL (ref 0–0.7)
EOSINOPHIL NFR BLD AUTO: 4 %
ERYTHROCYTE [DISTWIDTH] IN BLOOD BY AUTOMATED COUNT: 15.6 % (ref 10–15)
HCT VFR BLD AUTO: 37.6 % (ref 35–47)
HGB BLD-MCNC: 11.9 G/DL (ref 11.7–15.7)
IMM GRANULOCYTES # BLD: 0 10E3/UL
IMM GRANULOCYTES NFR BLD: 0 %
LYMPHOCYTES # BLD AUTO: 1.8 10E3/UL (ref 0.8–5.3)
LYMPHOCYTES NFR BLD AUTO: 44 %
MCH RBC QN AUTO: 27.5 PG (ref 26.5–33)
MCHC RBC AUTO-ENTMCNC: 31.6 G/DL (ref 31.5–36.5)
MCV RBC AUTO: 87 FL (ref 78–100)
MONOCYTES # BLD AUTO: 0.4 10E3/UL (ref 0–1.3)
MONOCYTES NFR BLD AUTO: 9 %
NEUTROPHILS # BLD AUTO: 1.7 10E3/UL (ref 1.6–8.3)
NEUTROPHILS NFR BLD AUTO: 42 %
NRBC # BLD AUTO: 0 10E3/UL
NRBC BLD AUTO-RTO: 0 /100
PLATELET # BLD AUTO: 196 10E3/UL (ref 150–450)
PROT SERPL-MCNC: 6.7 G/DL (ref 6.4–8.9)
RBC # BLD AUTO: 4.33 10E6/UL (ref 3.8–5.2)
WBC # BLD AUTO: 4.1 10E3/UL (ref 4–11)

## 2022-09-19 PROCEDURE — 85025 COMPLETE CBC W/AUTO DIFF WBC: CPT | Mod: ZL

## 2022-09-19 PROCEDURE — 36415 COLL VENOUS BLD VENIPUNCTURE: CPT | Mod: ZL

## 2022-09-19 PROCEDURE — 82040 ASSAY OF SERUM ALBUMIN: CPT | Mod: ZL

## 2022-09-27 ENCOUNTER — HOSPITAL ENCOUNTER (OUTPATIENT)
Dept: GENERAL RADIOLOGY | Facility: OTHER | Age: 57
Discharge: HOME | End: 2022-09-27
Attending: PHYSICIAN ASSISTANT
Payer: COMMERCIAL

## 2022-09-27 ENCOUNTER — OFFICE VISIT (OUTPATIENT)
Dept: FAMILY MEDICINE | Facility: OTHER | Age: 57
End: 2022-09-27
Attending: NURSE PRACTITIONER
Payer: COMMERCIAL

## 2022-09-27 VITALS
HEART RATE: 81 BPM | TEMPERATURE: 98.3 F | RESPIRATION RATE: 20 BRPM | OXYGEN SATURATION: 97 % | DIASTOLIC BLOOD PRESSURE: 64 MMHG | BODY MASS INDEX: 31.27 KG/M2 | WEIGHT: 180.9 LBS | SYSTOLIC BLOOD PRESSURE: 128 MMHG

## 2022-09-27 DIAGNOSIS — M79.10 MUSCLE TENSION PAIN: ICD-10-CM

## 2022-09-27 DIAGNOSIS — M89.8X1 PAIN IN SCAPULA: Primary | ICD-10-CM

## 2022-09-27 PROCEDURE — 73030 X-RAY EXAM OF SHOULDER: CPT | Mod: RT

## 2022-09-27 PROCEDURE — 99213 OFFICE O/P EST LOW 20 MIN: CPT | Performed by: PHYSICIAN ASSISTANT

## 2022-09-27 PROCEDURE — G0463 HOSPITAL OUTPT CLINIC VISIT: HCPCS

## 2022-09-27 ASSESSMENT — PAIN SCALES - GENERAL: PAINLEVEL: MODERATE PAIN (4)

## 2022-09-27 NOTE — NURSING NOTE
Patient here for right shoulder pain that is in the back between the shoulder blade and the spine. She has been having increasing pain over the past 2 months. Medication Reconciliation: complete.    Emy Jay LPN  9/27/2022 3:16 PM

## 2022-09-27 NOTE — PATIENT INSTRUCTIONS
Please refer to your AVS for follow up and pain/symptoms management recommendations (I.e.: medications, helpful conservative treatment modalities, appropriate follow up if need to a specialist or family practice, etc.). Please return to urgent care if your symptoms change or worsen.     Discharge instructions:  -If you were prescribed a medication(s), please take this as prescribed/directed  -Monitor your symptoms, if changing/worsening, return to UC/ER or PCP for follow up    For pain control - we recommend alternating Tylenol and Ibuprofen if you are able to take these medications. Alternate every 4 hours as needed. I.e.: Ibuprofen at 8am, Tylenol 12pm, Ibuprofen 4pm    -Daily maximum of Tylenol is 4000mg (recommend staying under 3000mg)   -Daily maximum of Ibuprofen is 3200 mg  - Heat, ice, gentle stretching (among other remedies: biofreeze/icy hot, etc).

## 2022-09-27 NOTE — PROGRESS NOTES
ASSESSMENT/PLAN:    I have reviewed the nursing notes.  I have reviewed the findings, diagnosis, plan and need for follow up with the patient.    1. Pain in scapula  2. Muscle tension pain  - XR Shoulder Right 2 Views  -Ongoing pain in right scapular region, x-rays obtained to rule out bony etiology to symptoms, etc.  X-ray returns normal although some mild scapular winging which is also noted on physical examination.  Discussed that patient does carry muscle tension in her upper trapezius musculature and this is likely the etiology to her symptoms.  I am unable to rule out cyst versus other etiology based off x-ray findings alone.  Recommend alternate Tylenol and ibuprofen, heat, ice, gentle massage and stretching.  If inadequate relief with these modalities of treatment she should follow-up with her PCP for additional evaluation. Patient is in agreement and understanding of the above treatment plan. All questions and concerns were addressed and answered to patient's satisfaction. AVS reviewed with patient.     Discussed warning signs/symptoms indicative of need to f/u    Follow up if symptoms persist or worsen or concerns    I explained my diagnostic considerations and recommendations to the patient, who voiced understanding and agreement with the treatment plan. All questions were answered. We discussed potential side effects of any prescribed or recommended therapies, as well as expectations for response to treatments.    Amelia Gomez PA-C  9/27/2022  3:17 PM    HPI:    Vikki Carrizales is a 57 year old female  who presents to [facility] today for concerns of right posterior shoulder pain.     Pain under right shoulder/arm which has been going on since 2 months.     She had cyst removed in this location 2018, 2020 with Dr. Vieyra.    She notes her  was concerned as when he was rubbing her back that there is a swelling/mass which was noted a few days ago.      Pain: variable/10  Quality of pain:  dull  Location: shoulder blade  Palliative: none  Provocative: motion  Numbness, tingling, burning: none  Mechanical symptoms (locking, popping, catching): none  Bruising/edema/erythema: none  Treatments tried: massage, NSAIDs  Prior falls, injuries or trauma: see above  Additional symptoms to report: none    Allergies: seasonal    PCP: none    Past Medical History:   Diagnosis Date     Anemia      Anxiety      Cutaneous abscess     2018     Gastro-esophageal reflux disease without esophagitis          Herpesviral infection of urogenital system     No Comments Provided     Impetigo     recurrent - as a child     Major depressive disorder, single episode     ,Rx: zoloft, citalopram and prozac in the past, none since )     Other seasonal allergic rhinitis     lifelong     Personal history of other medical treatment (CODE)      1 child  in childbirth (); one living child (b )     Uncomplicated asthma          Vitamin D deficiency     No Comments Provided     Past Surgical History:   Procedure Laterality Date      SECTION      ,,HELLP syndrome, Platelet transfusions; son       SECTION           COLONOSCOPY  2019    Normal exam, 5 year follow up per Dr. Yadav     COLONOSCOPY N/A 2019    Procedure: COLONOSCOPY;  Surgeon: Miguel A Yadav MD;  Location:  OR     ESOPHAGOSCOPY, GASTROSCOPY, DUODENOSCOPY (EGD), COMBINED N/A 2019    Procedure: ESOPHAGOGASTRODUODENOSCOPY (EGD);  Surgeon: Miguel A Yadav MD;  Location:  OR     GASTRIC BYPASS       LAPAROSCOPIC BYPASS GASTRIC      , Lenore en Y Gastric Bypass, Dr Gabriel Torres, [facility]     MOUTH SURGERY       [facility] ORAL SURGERY PROCEDURE       Social History     Tobacco Use     Smoking status: Never Smoker     Smokeless tobacco: Never Used   Substance Use Topics     Alcohol use: Yes     Comment: moderate, about 1 bottle of wine per week     Current Outpatient  Medications   Medication Sig Dispense Refill     acetaminophen (TYLENOL) 500 MG tablet Take 500-1,000 mg by mouth every 6 hours as needed for mild pain       albuterol (PROAIR HFA/PROVENTIL HFA/VENTOLIN HFA) 108 (90 Base) MCG/ACT inhaler Inhale 1 puff into the lungs every 6 hours as needed for shortness of breath / dyspnea or wheezing 18 g 11     chlorhexidine (PERIDEX) 0.12 % solution SWISH AND SPIT 15MLS BY MOUTH FOR 2 MINUTES TWICE DAILY       cyanocobalamin (VITAMIN B-12) 1000 MCG tablet Take 1 tablet (1,000 mcg) by mouth daily 90 tablet 3     estradiol (ESTRACE) 0.1 MG/GM vaginal cream Place 1 g vaginally daily for 14 days, THEN 1 g three times a week for 351 days. 42 g 3     ferrous sulfate (FEROSUL) 325 (65 Fe) MG tablet Take 1 tablet (325 mg) by mouth daily (with breakfast) 90 tablet 3     fluticasone (FLONASE) 50 MCG/ACT nasal spray Spray 2 sprays in nostril daily 18.2 mL 11     ibuprofen (ADVIL/MOTRIN) 400 MG tablet Take 400 mg by mouth every 6 hours as needed for moderate pain       Allergies   Allergen Reactions     Seasonal Allergies      seasonal     Past medical history, past surgical history, current medications and allergies reviewed and accurate to the best of my knowledge.      ROS:  Refer to HPI    /64   Pulse 81   Temp 98.3  F (36.8  C)   Resp 20   Wt 82.1 kg (180 lb 14.4 oz)   LMP  (LMP Unknown)   SpO2 97%   BMI 31.27 kg/m      EXAM:  General Appearance: Well appearing 57 year old female, appropriate appearance for age. No acute distress   Respiratory: normal chest wall and respirations.  Normal effort.  Clear to auscultation bilaterally, no wheezing, crackles or rhonchi.  No increased work of breathing.  No cough appreciated.  Cardiac: RRR with no murmurs  Musculoskeletal:   RIGHT SHOULDER PHYSICAL EXAMINATION:  Gross Examination: shoulders appear symmetrical in appearance, no signs of bony deformity over the AC, clavicle or glenohumeral joints. No signs of previous or current  trauma. No signs of ecchymosis. Skin is intact.  Mild right scapular winging.      Palpation: Tenderness to palpation: upper trapezius region    ROM: flexion, extension, abduction, adduction, IR, ER - full    Special Testing:   Shoulder Strength:    Speed/Yergason (bicep): not done      Instability:    Apprehension: not done    Cross Over: negative     Impingement:    Neer: negative    Cevallos: negative    Empty Can (supraspinatus): negative    Neurovascular status: distal pulses are intact and are 2+. Two point discrimination intact. Distal capillary refill intact < 3 seconds.   Dermatological: no rashes noted of exposed skin  Psychological: normal affect, alert, oriented, and pleasant.     Labs:  None     Xray:  Results for orders placed or performed in visit on 09/27/22   XR Shoulder Right 2 Views     Status: None    Narrative    PROCEDURE:  XR SHOULDER RIGHT 2 VIEWS    HISTORY: questionable muscle versus bony pathology mid scapular  border; Pain in scapula    COMPARISON:  None.    TECHNIQUE:  3 view right shoulder    FINDINGS:  No fracture or dislocation is identified. Smooth undulating  contour of the posterior mid scapular wing. No erosive change. The  joint spaces are preserved. Mild degenerative changes of the acromion  clavicular joint. No foreign body is seen. Visualized lungs are clear.        Impression    IMPRESSION:   No acute abnormality or exophytic scapular lesion. If there is  persistent clinical concern, consider cross-sectional imaging.      SILVESTRE VALLEJO MD         SYSTEM ID:  [id number]

## 2022-11-08 ENCOUNTER — IMMUNIZATION (OUTPATIENT)
Dept: FAMILY MEDICINE | Facility: OTHER | Age: 57
End: 2022-11-08
Payer: COMMERCIAL

## 2022-11-08 PROCEDURE — G0008 ADMIN INFLUENZA VIRUS VAC: HCPCS

## 2022-11-08 PROCEDURE — 0124A COVID-19,PF,PFIZER BOOSTER BIVALENT: CPT

## 2022-11-08 PROCEDURE — 91312 COVID-19,PF,PFIZER BOOSTER BIVALENT: CPT

## 2022-12-01 DIAGNOSIS — N94.10 FEMALE DYSPAREUNIA: ICD-10-CM

## 2022-12-02 RX ORDER — ESTRADIOL 0.1 MG/G
CREAM VAGINAL
Qty: 42.5 G | OUTPATIENT
Start: 2022-12-02

## 2022-12-02 NOTE — TELEPHONE ENCOUNTER
Zainab sent Rx request for the following:      ESTRADIOL 0.01% VAG CREAM 42.5GM      Last Prescription Date:   7/5/2022  Last Fill Qty/Refills:         42g, R-3      Redundant refill request refused: Too soon:  Babak Bartlett RN, BSN  ....................  12/2/2022   1:33 PM

## 2022-12-12 DIAGNOSIS — N94.10 FEMALE DYSPAREUNIA: ICD-10-CM

## 2022-12-13 RX ORDER — ESTRADIOL 0.1 MG/G
CREAM VAGINAL
Qty: 42.5 G | OUTPATIENT
Start: 2022-12-13

## 2022-12-13 NOTE — TELEPHONE ENCOUNTER
Patient should have refills on file. Refill denied.    estradiol (ESTRACE) 0.1 MG/GM vaginal cream 42 g 3 7/5/2022 7/5/2023 --   Sig - Route: Place 1 g vaginally daily for 14 days, THEN 1 g three times a week for 351 days. - Vaginal   Sent to pharmacy as: Estradiol 0.1 MG/GM Vaginal Cream (ESTRACE)   Class: E-Prescribe   Order: [id number]   E-Prescribing Status: Receipt confirmed by pharmacy (7/5/2022  1:38 PM CDT)     Radha Meza RN...................12/13/2022 10:55 AM

## 2022-12-14 DIAGNOSIS — N94.10 FEMALE DYSPAREUNIA: ICD-10-CM

## 2022-12-14 RX ORDER — ESTRADIOL 0.1 MG/G
CREAM VAGINAL
Qty: 42.5 G | Refills: 1 | OUTPATIENT
Start: 2022-12-14

## 2022-12-14 RX ORDER — ESTRADIOL 0.1 MG/G
1 CREAM VAGINAL
Qty: 42.5 G | Refills: 3 | Status: SHIPPED | OUTPATIENT
Start: 2022-12-15 | End: 2023-10-27

## 2023-06-23 ENCOUNTER — OFFICE VISIT (OUTPATIENT)
Dept: SURGERY | Facility: OTHER | Age: 58
End: 2023-06-23
Attending: SURGERY
Payer: COMMERCIAL

## 2023-06-23 VITALS
HEART RATE: 70 BPM | SYSTOLIC BLOOD PRESSURE: 116 MMHG | TEMPERATURE: 97.1 F | BODY MASS INDEX: 30.73 KG/M2 | WEIGHT: 180 LBS | OXYGEN SATURATION: 98 % | HEIGHT: 64 IN | DIASTOLIC BLOOD PRESSURE: 80 MMHG | RESPIRATION RATE: 16 BRPM

## 2023-06-23 DIAGNOSIS — L98.9 SKIN LESION: Primary | ICD-10-CM

## 2023-06-23 PROCEDURE — 11403 EXC TR-EXT B9+MARG 2.1-3CM: CPT | Performed by: SURGERY

## 2023-06-23 PROCEDURE — G0463 HOSPITAL OUTPT CLINIC VISIT: HCPCS | Mod: 25

## 2023-06-23 PROCEDURE — 88305 TISSUE EXAM BY PATHOLOGIST: CPT

## 2023-06-23 ASSESSMENT — PAIN SCALES - GENERAL: PAINLEVEL: NO PAIN (0)

## 2023-06-23 NOTE — PATIENT INSTRUCTIONS
Your incision was closed with stitches that will dissolve.     It is ok to remove the dressing and get the incision wet in the shower on the day after your procedure. Pat dry the area. Do not rub.    Once pathology is reviewed by the provider we will give you a call with the results.    Don't soak in a tub, pool or lake for 7 days. The small steri strips will start to peel off in 5-7 days it is ok to remove them. If you have concerns, please call [phone] and ask for nurse in [address] STEPHANIE Cuba RN  ....................  6/23/2023   8:13 AM

## 2023-06-23 NOTE — PROGRESS NOTES
Procedure Note     Pre/Post Operative Diagnosis:   Upper back skin lesion, left     Procedure:    Excision of Upper back skin lesion, left    Surgeon: MALI Yadav MD     Local Anesthesia: 1% lidocaine with0.25%Marcaine with epinephrine    Indication for the procedure:    This is a 57 year old female patient with Upper back skin lesion, left.  No previous history of skin cancer.  Patient noticed this lesion recently and states it has been getting larger.  Brother recently diagnosed with melanoma.  Clinically, there is a 7 mm x 6 mm pigmented, somewhat crusty skin lesion on the left upper back.  Color is homogeneous but borders are slightly irregular.  Suspect pigmented seborrheic keratosis.  Patient is quite concerned about the new lesion, given her brother's recent bout with melanoma and would like to have it excised.  I agree with this.  After explaining the risks to include bleeding, infection, recurrence or need for re-excision, and scarring the patient wished to proceed.    Procedure:   The area was prepped and draped in usual sterile fashion with ChloraPrep. After adequate local anesthesia, an elliptical skin incision was made to encompass the lesion, 2.7cm x 1.0 cm with margins. The skin was closed with 4-0 monocryl suture in a running fashion.  Skin was cleansed and dried and dressed with Mastisol, Steri-Strips and a clean bandage.    Plan:  The patient will be called with pathology results.  Patient will followup if there any problems with the wound including redness or drainage.      MALI Yadav MD

## 2023-06-23 NOTE — NURSING NOTE
"Chief Complaint   Patient presents with     Lesion     Left mid back        Initial /80   Pulse 70   Temp 97.1  F (36.2  C) (Tympanic)   Resp 16   Ht 1.613 m (5' 3.5\")   Wt 81.6 kg (180 lb)   LMP  (LMP Unknown)   SpO2 98%   BMI 31.39 kg/m   Estimated body mass index is 31.39 kg/m  as calculated from the following:    Height as of this encounter: 1.613 m (5' 3.5\").    Weight as of this encounter: 81.6 kg (180 lb).  Medication Reconciliation: complete    Steffanie Lorenzo LPN     Advanced Care Directive reviewed.     "

## 2023-06-23 NOTE — NURSING NOTE
TIMEOUT    Universal Protocol    A. Pre-procedure verification complete   1-relevant information / documentation available, reviewed and properly matched to the patient; 2-consent accurate and complete, 3-equipment and supplies available    B. Site marking complete   Site marked if not in continuous attendance with patient    C. TIME OUT completed   Time Out was conducted just prior to starting procedure to verify the eight required elements: 1-patient identity, 2-consent accurate and complete, 3-position, 4-correct side/site marked (if applicable), 5-procedure, 6-relevant images / results properly labeled and displayed (if applicable), 7-antibiotics / irrigation fluids (if applicable), 8-safety precautions.    Surgical Nurse  ....................  6/23/2023   8:12 AM

## 2023-06-28 LAB
PATH REPORT.COMMENTS IMP SPEC: NORMAL
PATH REPORT.FINAL DX SPEC: NORMAL
PHOTO IMAGE: NORMAL

## 2023-07-24 ENCOUNTER — MYC MEDICAL ADVICE (OUTPATIENT)
Dept: OBGYN | Facility: OTHER | Age: 58
End: 2023-07-24
Payer: COMMERCIAL

## 2023-07-30 ENCOUNTER — HEALTH MAINTENANCE LETTER (OUTPATIENT)
Age: 58
End: 2023-07-30

## 2023-08-11 DIAGNOSIS — Z98.84 HISTORY OF GASTRIC BYPASS: ICD-10-CM

## 2023-08-11 DIAGNOSIS — D50.8 OTHER IRON DEFICIENCY ANEMIA: ICD-10-CM

## 2023-08-15 RX ORDER — FERROUS SULFATE 325(65) MG
325 TABLET ORAL
Qty: 90 TABLET | Refills: 3 | Status: SHIPPED | OUTPATIENT
Start: 2023-08-15 | End: 2023-10-27

## 2023-09-19 ENCOUNTER — MYC MEDICAL ADVICE (OUTPATIENT)
Dept: OBGYN | Facility: OTHER | Age: 58
End: 2023-09-19
Payer: COMMERCIAL

## 2023-09-19 DIAGNOSIS — N94.10 FEMALE DYSPAREUNIA: ICD-10-CM

## 2023-09-20 RX ORDER — ESTRADIOL 0.1 MG/G
CREAM VAGINAL
Qty: 42 G | Refills: 3 | Status: SHIPPED | OUTPATIENT
Start: 2023-09-20 | End: 2023-10-27

## 2023-09-20 NOTE — TELEPHONE ENCOUNTER
Patient is wondering why she had a dosage change in the estradiol  vaginal cream. Patient was previously on  -  estradiol (ESTRACE) 0.1 MG/GM vaginal cream Route: Place 1 g vaginally daily for 14 days, THEN 1 g three times a week for 351 days.  Current prescription is for -  estradiol (ESTRACE) 0.1 MG/GM vaginal cream Route: Place 1 g vaginally twice a week.     Patient was last seen on 07/05/22. Patient is requesting that she go back of the medication three times per week.     New prescription pending.      America Jay RN on 9/20/2023 at 8:16 AM

## 2023-10-27 ENCOUNTER — OFFICE VISIT (OUTPATIENT)
Dept: FAMILY MEDICINE | Facility: OTHER | Age: 58
End: 2023-10-27
Attending: PHYSICIAN ASSISTANT
Payer: COMMERCIAL

## 2023-10-27 VITALS
TEMPERATURE: 97.4 F | HEART RATE: 70 BPM | BODY MASS INDEX: 31.46 KG/M2 | OXYGEN SATURATION: 97 % | WEIGHT: 180.4 LBS | SYSTOLIC BLOOD PRESSURE: 134 MMHG | DIASTOLIC BLOOD PRESSURE: 80 MMHG | RESPIRATION RATE: 16 BRPM

## 2023-10-27 DIAGNOSIS — E55.9 HYPOVITAMINOSIS D: ICD-10-CM

## 2023-10-27 DIAGNOSIS — F41.1 GAD (GENERALIZED ANXIETY DISORDER): ICD-10-CM

## 2023-10-27 DIAGNOSIS — R79.0 LOW FERRITIN: ICD-10-CM

## 2023-10-27 DIAGNOSIS — Z13.220 SCREENING CHOLESTEROL LEVEL: ICD-10-CM

## 2023-10-27 DIAGNOSIS — F33.0 MILD RECURRENT MAJOR DEPRESSION (H): ICD-10-CM

## 2023-10-27 DIAGNOSIS — E53.8 VITAMIN B12 DEFICIENCY: ICD-10-CM

## 2023-10-27 DIAGNOSIS — J45.909 UNCOMPLICATED ASTHMA, UNSPECIFIED ASTHMA SEVERITY, UNSPECIFIED WHETHER PERSISTENT: ICD-10-CM

## 2023-10-27 DIAGNOSIS — Z23 NEED FOR COVID-19 VACCINE: ICD-10-CM

## 2023-10-27 DIAGNOSIS — D50.8 OTHER IRON DEFICIENCY ANEMIA: ICD-10-CM

## 2023-10-27 DIAGNOSIS — Z23 NEED FOR DIPHTHERIA-TETANUS-PERTUSSIS (TDAP) VACCINE: ICD-10-CM

## 2023-10-27 DIAGNOSIS — Z00.00 ROUTINE GENERAL MEDICAL EXAMINATION AT A HEALTH CARE FACILITY: Primary | ICD-10-CM

## 2023-10-27 DIAGNOSIS — L98.9 SKIN LESION: ICD-10-CM

## 2023-10-27 DIAGNOSIS — Z23 NEEDS FLU SHOT: ICD-10-CM

## 2023-10-27 DIAGNOSIS — N94.10 FEMALE DYSPAREUNIA: ICD-10-CM

## 2023-10-27 DIAGNOSIS — Z98.84 HISTORY OF GASTRIC BYPASS: ICD-10-CM

## 2023-10-27 DIAGNOSIS — Z01.419 PAP TEST, AS PART OF ROUTINE GYNECOLOGICAL EXAMINATION: ICD-10-CM

## 2023-10-27 DIAGNOSIS — Z13.1 SCREENING FOR DIABETES MELLITUS: ICD-10-CM

## 2023-10-27 LAB
ALBUMIN SERPL BCG-MCNC: 4.1 G/DL (ref 3.5–5.2)
ALP SERPL-CCNC: 117 U/L (ref 35–104)
ALT SERPL W P-5'-P-CCNC: 19 U/L (ref 0–50)
ANION GAP SERPL CALCULATED.3IONS-SCNC: 9 MMOL/L (ref 7–15)
AST SERPL W P-5'-P-CCNC: 28 U/L (ref 0–45)
BASOPHILS # BLD AUTO: 0.1 10E3/UL (ref 0–0.2)
BASOPHILS NFR BLD AUTO: 3 %
BILIRUB SERPL-MCNC: 0.4 MG/DL
BUN SERPL-MCNC: 10.2 MG/DL (ref 6–20)
CALCIUM SERPL-MCNC: 9.4 MG/DL (ref 8.6–10)
CHLORIDE SERPL-SCNC: 105 MMOL/L (ref 98–107)
CHOLEST SERPL-MCNC: 167 MG/DL
CREAT SERPL-MCNC: 0.72 MG/DL (ref 0.51–0.95)
DEPRECATED HCO3 PLAS-SCNC: 24 MMOL/L (ref 22–29)
EGFRCR SERPLBLD CKD-EPI 2021: >90 ML/MIN/1.73M2
EOSINOPHIL # BLD AUTO: 0.2 10E3/UL (ref 0–0.7)
EOSINOPHIL NFR BLD AUTO: 4 %
ERYTHROCYTE [DISTWIDTH] IN BLOOD BY AUTOMATED COUNT: 13.2 % (ref 10–15)
FERRITIN SERPL-MCNC: 14 NG/ML (ref 11–328)
GLUCOSE SERPL-MCNC: 87 MG/DL (ref 70–99)
HCT VFR BLD AUTO: 39 % (ref 35–47)
HDLC SERPL-MCNC: 70 MG/DL
HGB BLD-MCNC: 13 G/DL (ref 11.7–15.7)
IMM GRANULOCYTES # BLD: 0 10E3/UL
IMM GRANULOCYTES NFR BLD: 0 %
IRON BINDING CAPACITY (ROCHE): 357 UG/DL (ref 240–430)
IRON SATN MFR SERPL: 23 % (ref 15–46)
IRON SERPL-MCNC: 83 UG/DL (ref 37–145)
LDLC SERPL CALC-MCNC: 87 MG/DL
LYMPHOCYTES # BLD AUTO: 1.7 10E3/UL (ref 0.8–5.3)
LYMPHOCYTES NFR BLD AUTO: 42 %
MCH RBC QN AUTO: 30.8 PG (ref 26.5–33)
MCHC RBC AUTO-ENTMCNC: 33.3 G/DL (ref 31.5–36.5)
MCV RBC AUTO: 92 FL (ref 78–100)
MONOCYTES # BLD AUTO: 0.3 10E3/UL (ref 0–1.3)
MONOCYTES NFR BLD AUTO: 8 %
NEUTROPHILS # BLD AUTO: 1.7 10E3/UL (ref 1.6–8.3)
NEUTROPHILS NFR BLD AUTO: 43 %
NONHDLC SERPL-MCNC: 97 MG/DL
NRBC # BLD AUTO: 0 10E3/UL
NRBC BLD AUTO-RTO: 0 /100
PLATELET # BLD AUTO: 232 10E3/UL (ref 150–450)
POTASSIUM SERPL-SCNC: 4.2 MMOL/L (ref 3.4–5.3)
PROT SERPL-MCNC: 6.9 G/DL (ref 6.4–8.3)
RBC # BLD AUTO: 4.22 10E6/UL (ref 3.8–5.2)
SODIUM SERPL-SCNC: 138 MMOL/L (ref 135–145)
TRIGL SERPL-MCNC: 52 MG/DL
VIT B12 SERPL-MCNC: 1011 PG/ML (ref 232–1245)
VIT D+METAB SERPL-MCNC: 19 NG/ML (ref 20–50)
WBC # BLD AUTO: 4 10E3/UL (ref 4–11)

## 2023-10-27 PROCEDURE — 80061 LIPID PANEL: CPT | Mod: ZL | Performed by: PHYSICIAN ASSISTANT

## 2023-10-27 PROCEDURE — 80053 COMPREHEN METABOLIC PANEL: CPT | Mod: ZL | Performed by: PHYSICIAN ASSISTANT

## 2023-10-27 PROCEDURE — 83550 IRON BINDING TEST: CPT | Mod: ZL | Performed by: PHYSICIAN ASSISTANT

## 2023-10-27 PROCEDURE — 82306 VITAMIN D 25 HYDROXY: CPT | Mod: ZL | Performed by: PHYSICIAN ASSISTANT

## 2023-10-27 PROCEDURE — G0123 SCREEN CERV/VAG THIN LAYER: HCPCS | Performed by: PHYSICIAN ASSISTANT

## 2023-10-27 PROCEDURE — 99396 PREV VISIT EST AGE 40-64: CPT | Performed by: PHYSICIAN ASSISTANT

## 2023-10-27 PROCEDURE — 82607 VITAMIN B-12: CPT | Mod: ZL | Performed by: PHYSICIAN ASSISTANT

## 2023-10-27 PROCEDURE — 87624 HPV HI-RISK TYP POOLED RSLT: CPT | Mod: ZL | Performed by: PHYSICIAN ASSISTANT

## 2023-10-27 PROCEDURE — 91320 SARSCV2 VAC 30MCG TRS-SUC IM: CPT

## 2023-10-27 PROCEDURE — 90715 TDAP VACCINE 7 YRS/> IM: CPT

## 2023-10-27 PROCEDURE — G0008 ADMIN INFLUENZA VIRUS VAC: HCPCS

## 2023-10-27 PROCEDURE — 82728 ASSAY OF FERRITIN: CPT | Mod: ZL | Performed by: PHYSICIAN ASSISTANT

## 2023-10-27 PROCEDURE — 85041 AUTOMATED RBC COUNT: CPT | Mod: ZL | Performed by: PHYSICIAN ASSISTANT

## 2023-10-27 PROCEDURE — 36415 COLL VENOUS BLD VENIPUNCTURE: CPT | Mod: ZL | Performed by: PHYSICIAN ASSISTANT

## 2023-10-27 RX ORDER — FERROUS SULFATE 325(65) MG
325 TABLET ORAL
Qty: 90 TABLET | Refills: 3 | Status: SHIPPED | OUTPATIENT
Start: 2023-10-27

## 2023-10-27 RX ORDER — FLUTICASONE PROPIONATE 50 MCG
2 SPRAY, SUSPENSION (ML) NASAL DAILY
Qty: 18.2 ML | Refills: 11 | Status: SHIPPED | OUTPATIENT
Start: 2023-10-27

## 2023-10-27 RX ORDER — ESTRADIOL 0.1 MG/G
CREAM VAGINAL
Qty: 42 G | Refills: 11 | Status: SHIPPED | OUTPATIENT
Start: 2023-10-27

## 2023-10-27 RX ORDER — ALBUTEROL SULFATE 90 UG/1
1 AEROSOL, METERED RESPIRATORY (INHALATION) EVERY 6 HOURS PRN
Qty: 18 G | Refills: 11 | Status: SHIPPED | OUTPATIENT
Start: 2023-10-27 | End: 2024-01-18

## 2023-10-27 RX ORDER — BUPROPION HYDROCHLORIDE 150 MG/1
150 TABLET ORAL EVERY MORNING
Qty: 90 TABLET | Refills: 3 | Status: SHIPPED | OUTPATIENT
Start: 2023-10-27

## 2023-10-27 RX ORDER — LANOLIN ALCOHOL/MO/W.PET/CERES
1000 CREAM (GRAM) TOPICAL DAILY
Qty: 90 TABLET | Refills: 3 | Status: SHIPPED | OUTPATIENT
Start: 2023-10-27

## 2023-10-27 ASSESSMENT — ENCOUNTER SYMPTOMS
SORE THROAT: 0
CHILLS: 0
COUGH: 0
HEADACHES: 0
HEARTBURN: 0
SHORTNESS OF BREATH: 0
HEMATOCHEZIA: 0
NAUSEA: 0
DIZZINESS: 0
JOINT SWELLING: 0
WEAKNESS: 0
PALPITATIONS: 0
PARESTHESIAS: 0
CONSTIPATION: 0
FREQUENCY: 0
ABDOMINAL PAIN: 0
MYALGIAS: 0
ARTHRALGIAS: 0
DYSURIA: 0
FEVER: 0
BREAST MASS: 0
EYE PAIN: 0
NERVOUS/ANXIOUS: 0
HEMATURIA: 0
DIARRHEA: 0

## 2023-10-27 ASSESSMENT — PATIENT HEALTH QUESTIONNAIRE - PHQ9
SUM OF ALL RESPONSES TO PHQ QUESTIONS 1-9: 8
SUM OF ALL RESPONSES TO PHQ QUESTIONS 1-9: 8
10. IF YOU CHECKED OFF ANY PROBLEMS, HOW DIFFICULT HAVE THESE PROBLEMS MADE IT FOR YOU TO DO YOUR WORK, TAKE CARE OF THINGS AT HOME, OR GET ALONG WITH OTHER PEOPLE: SOMEWHAT DIFFICULT

## 2023-10-27 ASSESSMENT — ASTHMA QUESTIONNAIRES: ACT_TOTALSCORE: 23

## 2023-10-27 ASSESSMENT — PAIN SCALES - GENERAL: PAINLEVEL: NO PAIN (0)

## 2023-10-27 NOTE — NURSING NOTE
Chief Complaint   Patient presents with    Physical         Medication Reconciliation: complete    Korina Montes De Oca, LPN     [Normal Growth] : growth [Normal Development] : development [None] : No known medical problems [No Elimination Concerns] : elimination [No Feeding Concerns] : feeding [No Skin Concerns] : skin [Normal Sleep Pattern] : sleep [Family Support] : family support [Establishing Routines] : establishing routines [Feeding and Appetite Changes] : feeding and appetite changes [Establishing A Dental Home] : establishing a dental home [Safety] : safety [No Medications] : ~He/She~ is not on any medications [Parent/Guardian] : parent/guardian [Father] : father [] : The components of the vaccine(s) to be administered today are listed in the plan of care. The disease(s) for which the vaccine(s) are intended to prevent and the risks have been discussed with the caretaker.  The risks are also included in the appropriate vaccination information statements which have been provided to the patient's caregiver.  The caregiver has given consent to vaccinate. [FreeTextEntry1] : \par \par 12 month old female currently well, with normal growth/development. \par d/w dad that some kids do not crawl and just go straight to walking - her neuro/MS exam is normal. Will monitor. \par Continue whole cow's milk. Continue table foods, 3 meals with 2-3 snacks per day. Incorporate water daily in a sippy cup. Start to wean off bottles/pacifiers. \par Brush teeth twice a day with soft toothbrush.  Continue MVI-FL\par When in car, keep child in rear-facing car seats until age 2, or until  the maximum height and weight for seat is reached. \par Put baby to sleep in own crib. Lower crib mattress. \par Help baby to maintain consistent daily routines and sleep schedule. \par Recognize stranger and separation anxiety. \par Ensure home is safe since baby is increasingly mobile. Keep all meds/ out of child's reach\par Water, outdoor and sun safety reviewed. \par Choking prevention discussed in detail. No hanging strings, water safety, close toilet etc. \par Be within arm's reach of baby at all times. \par Use consistent, positive discipline. \par Read aloud to baby. Avoid screen time. \par d/w dad vaccines - MMR & VZV due - risks/benefits/side effects reviewed, VIS given, dad agrees without questions. Will return for flu vaccine with nurse and HepA \par CBC/LEAD  - d/w dad the importance of testing & he agrees to go to lab. \par Masking, social distancing and hand hygiene reviewed.\par Return in 3 months for 15 month well child check.\par Return sooner PRN\par Dad without questions at this time.\par

## 2023-10-27 NOTE — PATIENT INSTRUCTIONS
"Healthy Strategies  Eat at least 3 meals a day and never skip breakfast.  Eat more slowly.  Decrease portion size.  Provide structure by using meal replacement bars or shakes, and/or low calorie frozen meals.  For good nutrition incorporate fruit, vegetables, whole grains, lean protein, and low-fat dairy.  Remove trigger foods from yourenvironment to avoid impulse eating.  Increase physical activity: get a pedometer and aim for 10,000 steps a day or 30-35 minutes of activity 5 days per week.  Weigh yourself daily or at least weekly.  Keep a record of what you eat and your activity.  Establish a support system such as afriend, group or program.    11. Read Clay Wasserman's \"Eat to Live\". Remember it is important to have a minimum of 1200 calories a day, okay to use olive oil, 40 grams of fiber daily. No more than two servings (the size of your palm) of red meat a week.     Please consider the following general health recommendations:    Eat a quality diet (generally, low in simple sugars, starches, cholesterol and saturated fat.)    Please get 1200 mg of calcium in divided doses with 800 units vitamin D in your diet daily. Take supplements as needed to obtain full recommended amounts.     Stay physically active. Regular walking or other exercise is one of the best ways to minimize pain of arthritis; maintain independence and mobility; maintain bone strength; maintain conditioning of your heart. Find something you enjoy and a friend to do it with you.    Maintain ideal weight. Your Body mass index is There is no height or weight on file to calculate BMI.. Generally a BMI of 20-25 is considered ideal. Overweight is defined as 25-30, obese is 30-35 and markedly obese is greater than 35.    Apply sun block (SPF 25 or greater) on exposed skin anytime you are out in the sun to prevent skin cancer.     Wear a seatbelt whenever you are in a car.    Obtain a flu shot every fall.    You should have a tetanus booster at least " once every 10 years.    Schedule a mammogram annually starting at the age of 40 years old unless recommended earlier by your primary care provider. Come for a general exam once yearly.    Colonoscopy (an exam of the colon) is recommended every 10 years after the age of 45 to screen for colon cancer. (More often if you are at increased risk.)    A vaccine to reduce your chances of getting shingles is available if you are over 50. Information about the vaccine is available through the clinic or at:  (https://www.cdc.gov/vaccines/vpd/shingles/public/shingrix/index.html)    Consider a bone density study every 2-5 years to see if you are at increased risk for fracture. If you have osteoporosis, medicine to strengthen your bones can significantly reduce your risk of fracture, back pain, and loss of height.    Receive a pneumonia shot series after the age of 65 (repeat in 5 years if you have other risk factors). This does not prevent all types of pneumonia, but reduces the risk of the worst bacterial causes of pneumonia.    Check blood sugar annually. Cholesterol annually unless you have had a normal level when last checked within 5 years.     I recommend that you have a general physical exam every year. You should have a pap test every 3 years between the ages of 21 and 30 and every 3-5 years between the ages of 30 and 65 depending on your test unless you have had previous abnormal pap smears, (in these cases the exams and PAP's should be done on a schedule as recommended by your primary care provider). If you have had hysterectomy in the past, your future Pap plan may be different.      Preventive Health Recommendations  Female Ages 50 - 64    Yearly exam: See your health care provider every year in order to  Review health changes.   Discuss preventive care.    Review your medicines if your doctor has prescribed any.    Get a Pap test every three years (unless you have an abnormal result and your provider advises testing  more often).  If you get Pap tests with HPV test, you only need to test every 5 years, unless you have an abnormal result.   You do not need a Pap test if your uterus was removed (hysterectomy) and you have not had cancer.  You should be tested each year for STDs (sexually transmitted diseases) if you're at risk.   Have a mammogram every 1 to 2 years.  Have a colonoscopy at age 45, or have a yearly FIT test (stool test). These exams screen for colon cancer.    Have a cholesterol test every 5 years, or more often if advised.  Have a diabetes test (fasting glucose) every three years. If you are at risk for diabetes, you should have this test more often.   If you are at risk for osteoporosis (brittle bone disease), think about having a bone density scan (DEXA).    Shots: Get a flu shot each year. Get a tetanus shot every 10 years.    Nutrition:   Eat at least 5 servings of fruits and vegetables each day.  Eat whole-grain bread, whole-wheat pasta and brown rice instead of white grains and rice.  Get adequate Calcium and Vitamin D.     Lifestyle  Exercise at least 150 minutes a week (30 minutes a day, 5 days a week). This will help you control your weight and prevent disease.  Limit alcohol to one drink per day.  No smoking.   Wear sunscreen to prevent skin cancer.   See your dentist every six months for an exam and cleaning.  See your eye doctor every 1 to 2 years.      Depression:   Started on Wellbutrin.  Encouraged good diet and exercise.   Encouraged to see a counselor.   Return in 4-6 weeks for recheck if needed.   Return to clinic with change/worsening of symptoms.       Suicide Emergency First call for help:  [phone]          Counselors:     [facility]: [phone]    Shayla Torres: [phone]    Delisa Matutebraxton: [phone]    Augustin Torres CNP, [facility]: [phone]    Debi Mancilla: [phone]    [facility]:  [phone]    Marquise Shah: [phone]    [phone]    [facility]: [phone]    Farrah Garnett: [phone]    [facility]: [phone]    Adolescent Counseling:   [facility]: [phone]    [facility]: [phone]    [facility]: [phone]

## 2023-10-27 NOTE — LETTER
My Asthma Action Plan    Name: Vikki Carrizales   YOB: 1965  Date: 10/27/2023   My doctor: Tia Magallanes PA-C   My clinic: [facility]        My Rescue Medicine:   Albuterol inhaler (Proair/Ventolin/Proventil HFA)  2-4 puffs EVERY 4 HOURS as needed. Use a spacer if recommended by your provider.   My Asthma Severity:   Intermittent / Exercise Induced  Know your asthma triggers: humidity and exercise or sports             GREEN ZONE   Good Control  I feel good  No cough or wheeze  Can work, sleep and play without asthma symptoms       Take your asthma control medicine every day.     If exercise triggers your asthma, take your rescue medication  15 minutes before exercise or sports, and  During exercise if you have asthma symptoms  Spacer to use with inhaler: If you have a spacer, make sure to use it with your inhaler             YELLOW ZONE Getting Worse  I have ANY of these:  I do not feel good  Cough or wheeze  Chest feels tight  Wake up at night   Keep taking your Green Zone medications  Start taking your rescue medicine:  every 20 minutes for up to 1 hour. Then every 4 hours for 24-48 hours.  If you stay in the Yellow Zone for more than 12-24 hours, contact your doctor.  If you do not return to the Green Zone in 12-24 hours or you get worse, start taking your oral steroid medicine if prescribed by your provider.           RED ZONE Medical Alert - Get Help  I have ANY of these:  I feel awful  Medicine is not helping  Breathing getting harder  Trouble walking or talking  Nose opens wide to breathe       Take your rescue medicine NOW  If your provider has prescribed an oral steroid medicine, start taking it NOW  Call your doctor NOW  If you are still in the Red Zone after 20 minutes and you have not reached your doctor:  Take your rescue medicine again and  Call 911 or go to the emergency room right away    See your regular doctor within 2 weeks of an Emergency Room or Urgent Care  visit for follow-up treatment.          Annual Reminders:  Meet with Asthma Educator,  Flu Shot in the Fall, consider Pneumonia Vaccination for patients with asthma (aged 19 and older).    Pharmacy:    [id number] - [place], [place] - [address]  AT [facility]  [address]  [id number] - [place] - [address] AT [facility] [address]    Electronically signed by Tia Magallanes PA-C   Date: 10/27/23                    Asthma Triggers  How To Control Things That Make Your Asthma Worse    Triggers are things that make your asthma worse.  Look at the list below to help you find your triggers and   what you can do about them. You can help prevent asthma flare-ups by staying away from your triggers.      Trigger                                                          What you can do   Cigarette Smoke  Tobacco smoke can make asthma worse. Do not allow smoking in your home, car or around you.  Be sure no one smokes at a child s day care or school.  If you smoke, ask your health care provider for ways to help you quit.  Ask family members to quit too.  Ask your health care provider for a referral to Quit Plan to help you quit smoking, or call 0-032-029-PLAN.     Colds, Flu, Bronchitis  These are common triggers of asthma. Wash your hands often.  Don t touch your eyes, nose or mouth.  Get a flu shot every year.     Dust Mites  These are tiny bugs that live in cloth or carpet. They are too small to see. Wash sheets and blankets in hot water every week.   Encase pillows and mattress in dust mite proof covers.  Avoid having carpet if you can. If you have carpet, vacuum weekly.   Use a dust mask and HEPA vacuum.   Pollen and Outdoor Mold  Some people are allergic to trees, grass, or weed pollen, or molds. Try to keep your windows closed.  Limit time out doors when pollen count is high.   Ask you health care provider about taking medicine during allergy season.     Animal Dander  Some  people are allergic to skin flakes, urine or saliva from pets with fur or feathers. Keep pets with fur or feathers out of your home.    If you can t keep the pet outdoors, then keep the pet out of your bedroom.  Keep the bedroom door closed.  Keep pets off cloth furniture and away from stuffed toys.     Mice, Rats, and Cockroaches  Some people are allergic to the waste from these pests.   Cover food and garbage.  Clean up spills and food crumbs.  Store grease in the refrigerator.   Keep food out of the bedroom.   Indoor Mold  This can be a trigger if your home has high moisture. Fix leaking faucets, pipes, or other sources of water.   Clean moldy surfaces.  Dehumidify basement if it is damp and smelly.   Smoke, Strong Odors, and Sprays  These can reduce air quality. Stay away from strong odors and sprays, such as perfume, powder, hair spray, paints, smoke incense, paint, cleaning products, candles and new carpet.   Exercise or Sports  Some people with asthma have this trigger. Be active!  Ask your doctor about taking medicine before sports or exercise to prevent symptoms.    Warm up for 5-10 minutes before and after sports or exercise.     Other Triggers of Asthma  Cold air:  Cover your nose and mouth with a scarf.  Sometimes laughing or crying can be a trigger.  Some medicines and food can trigger asthma.

## 2023-10-27 NOTE — PROGRESS NOTES
SUBJECTIVE:   CC: Vikki is an 58 year old who presents for preventive health visit.       10/27/2023     8:24 AM   Additional Questions   Roomed by Korina Montes De Oca       Healthy Habits:     Getting at least 3 servings of Calcium per day:  Yes    Bi-annual eye exam:  NO    Dental care twice a year:  Yes    Sleep apnea or symptoms of sleep apnea:  None    Diet:  Regular (no restrictions)    Frequency of exercise:  None    Taking medications regularly:  Yes    Medication side effects:  None    Additional concerns today:  No      Today's PHQ-9 Score:       10/27/2023     8:10 AM   PHQ-9 SCORE   PHQ-9 Total Score MyChart 8 (Mild depression)   PHQ-9 Total Score 8     No LMP recorded (lmp unknown). Patient is postmenopausal.   Contraception: postmenopausal  Risk for STI?: none  Last pap: 3/25/2019 -normal pap and HPV, repeat in 5 years - repeated today  Any hx of abnormal paps: None   FH of early CA?: None  Cholesterol/DM concerns/screenin, repeated  Tobacco?: none  Lung cancer screening: na  Calcium intake: yes  DEXA: na  Last mammo: 3/25/2019 -has repeat scheduled on 2023  Colonoscopy: 2019 - repeat in 5 years  Hepatitis C screen: 3/26/2019 - negative    HIV screen: 3/22/2019 - negative   Immunizations: flu, covid, and tdap    Patient concerned about possible mild depression.  Went back to work this summer.  Enjoying her job.  Has noticed that she feels a little bit more down.  Wondered about going back on Wellbutrin.  Tolerated Wellbutrin in the past.  No side effects noted.  No suicidal or homicidal ideation.  No panic attacks.    She has been out of her vitamin B12.  Wondering about restarting.    History of having pain with intercourse.  Has improved with estrogen.  No side effects noted.  She did increase from twice a week to 3 times a week 2 weeks ago.  She then noticed a very small amount of light red vaginal bleeding over the next week.  None since then.  No history of abnormal vaginal bleeding  or spotting.  No pelvic pain or concerns.  She did consult with her OB/GYN provider.    Asthma: Currently stable.  No acute concerns at this time with her breathing.    Social History     Tobacco Use    Smoking status: Never    Smokeless tobacco: Never   Substance Use Topics    Alcohol use: Yes     Alcohol/week: 4.0 standard drinks of alcohol     Types: 2 Glasses of wine, 2 Standard drinks or equivalent per week             10/27/2023     8:12 AM   Alcohol Use   Prescreen: >3 drinks/day or >7 drinks/week? No     Reviewed orders with patient.  Reviewed health maintenance and updated orders accordingly - Yes  Labs reviewed in EPIC  BP Readings from Last 3 Encounters:   10/27/23 134/80   23 116/80   22 128/64    Wt Readings from Last 3 Encounters:   10/27/23 81.8 kg (180 lb 6.4 oz)   23 81.6 kg (180 lb)   22 82.1 kg (180 lb 14.4 oz)                  Patient Active Problem List   Diagnosis    Allergic rhinitis    Anemia, iron deficiency    Asthma    Depression, major, recurrent (H24)    Genital herpes    Esophageal reflux    Restless leg syndrome    Non morbid obesity due to excess calories    Thrombophlebitis leg    Venous insufficiency    Vitamin B12 deficiency    Right ear pain     Past Surgical History:   Procedure Laterality Date     SECTION      ,,HELLP syndrome, Platelet transfusions; son      SECTION          COLONOSCOPY  2019    Normal exam, 5 year follow up per Dr. Yadav    COLONOSCOPY N/A 2019    Procedure: COLONOSCOPY;  Surgeon: Miguel A Yadav MD;  Location:  OR, repeat in 5 years    ESOPHAGOSCOPY, GASTROSCOPY, DUODENOSCOPY (EGD), COMBINED N/A 2019    Procedure: ESOPHAGOGASTRODUODENOSCOPY (EGD);  Surgeon: Miguel A Yadav MD;  Location:  OR    GASTRIC BYPASS      LAPAROSCOPIC BYPASS GASTRIC      , Lenore en Y Gastric Bypass, Dr Gabriel Torres, [facility]    MOUTH SURGERY      [facility] ORAL SURGERY PROCEDURE          Social History     Tobacco Use    Smoking status: Never    Smokeless tobacco: Never   Substance Use Topics    Alcohol use: Yes     Alcohol/week: 4.0 standard drinks of alcohol     Types: 2 Glasses of wine, 2 Standard drinks or equivalent per week     Family History   Problem Relation Age of Onset    Other - See Comments Mother     Aortic aneurysm Father     Arrhythmia Father     Diabetes Maternal Grandmother         Diabetes    Other - See Comments Maternal Grandmother         Stroke    Other - See Comments Maternal Grandfather         Depression    Osteoporosis Maternal Grandfather         Osteoporosis    Diabetes Maternal Aunt         Diabetes    Other - See Comments Sister         Thrombophlebitis    Heart Disease Brother         Heart Disease    Breast Cancer No family hx of         Cancer-breast         Current Outpatient Medications   Medication Sig Dispense Refill    acetaminophen (TYLENOL) 500 MG tablet Take 500-1,000 mg by mouth every 6 hours as needed for mild pain      albuterol (PROAIR HFA/PROVENTIL HFA/VENTOLIN HFA) 108 (90 Base) MCG/ACT inhaler Inhale 1 puff into the lungs every 6 hours as needed for shortness of breath or wheezing 18 g 11    buPROPion (WELLBUTRIN XL) 150 MG 24 hr tablet Take 1 tablet (150 mg) by mouth every morning 90 tablet 3    cyanocobalamin (VITAMIN B-12) 1000 MCG tablet Take 1 tablet (1,000 mcg) by mouth daily 90 tablet 3    estradiol (ESTRACE) 0.1 MG/GM vaginal cream Place vaginally three times a week 42 g 11    ferrous sulfate (FEROSUL) 325 (65 Fe) MG tablet Take 1 tablet (325 mg) by mouth daily (with breakfast) 90 tablet 3    fluticasone (FLONASE) 50 MCG/ACT nasal spray Spray 2 sprays in nostril daily 18.2 mL 11    ibuprofen (ADVIL/MOTRIN) 400 MG tablet Take 400 mg by mouth every 6 hours as needed for moderate pain       Allergies   Allergen Reactions    Seasonal Allergies      seasonal     Recent Labs   Lab Test 10/27/23  0919 09/19/22  1527 06/07/22  0927  03/22/19  1553 04/10/17  1733   LDL 87  --  104* 80  --    HDL 70  --  63 55  --    TRIG 52  --  70 73  --    ALT 19 15 18 16  --    CR 0.72  --  0.76 0.81 0.80   GFRESTIMATED >90  --  >90 74  --    GFRESTBLACK  --   --   --  90 >60   POTASSIUM 4.2  --  4.2 4.0 3.8        Breast Cancer Screening:  Any new diagnosis of family breast, ovarian, or bowel cancer? No    FHS-7:       7/5/2022    12:52 PM   Breast CA Risk Assessment (FHS-7)   Did any of your first-degree relatives have breast or ovarian cancer? No   Did any of your relatives have bilateral breast cancer? No   Did any man in your family have breast cancer? No   Did any woman in your family have breast and ovarian cancer? No   Did any woman in your family have breast cancer before age 50 y? No   Do you have 2 or more relatives with breast and/or ovarian cancer? No   Do you have 2 or more relatives with breast and/or bowel cancer? No       Mammogram Screening: Recommended mammography every 1-2 years with patient discussion and risk factor consideration  Pertinent mammograms are reviewed under the imaging tab.    History of abnormal Pap smear: Last 3 Pap and HPV Results:       Latest Ref Rng & Units 3/25/2019     4:49 PM   PAP / HPV   PAP (Historical)  NIL    HPV 16 DNA NEG^Negative Negative    HPV 18 DNA NEG^Negative Negative    Other HR HPV NEG^Negative Negative          Latest Ref Rng & Units 3/25/2019     4:49 PM   PAP / HPV   PAP (Historical)  NIL    HPV 16 DNA NEG^Negative Negative    HPV 18 DNA NEG^Negative Negative    Other HR HPV NEG^Negative Negative      Reviewed and updated as needed this visit by clinical staff   Tobacco  Allergies  Meds  Problems  Med Hx  Surg Hx  Fam Hx          Reviewed and updated as needed this visit by Provider   Tobacco  Allergies  Meds  Problems  Med Hx  Surg Hx  Fam Hx         Past Medical History:   Diagnosis Date    Anemia     Anxiety     Cutaneous abscess     1/16/2018    Gastro-esophageal reflux disease  without esophagitis         Herpesviral infection of urogenital system     No Comments Provided    Impetigo     recurrent - as a child    Major depressive disorder, single episode     ,Rx: zoloft, citalopram and prozac in the past, none since )    Other seasonal allergic rhinitis     lifelong    Personal history of other medical treatment (CODE)      1 child  in childbirth (); one living child (b )    Uncomplicated asthma         Vitamin D deficiency     No Comments Provided      Past Surgical History:   Procedure Laterality Date     SECTION      ,,HELLP syndrome, Platelet transfusions; son      SECTION          COLONOSCOPY  2019    Normal exam, 5 year follow up per Dr. Yadav    COLONOSCOPY N/A 2019    Procedure: COLONOSCOPY;  Surgeon: Miguel A Yadav MD;  Location:  OR, repeat in 5 years    ESOPHAGOSCOPY, GASTROSCOPY, DUODENOSCOPY (EGD), COMBINED N/A 2019    Procedure: ESOPHAGOGASTRODUODENOSCOPY (EGD);  Surgeon: Miguel A Yadav MD;  Location:  OR    GASTRIC BYPASS      LAPAROSCOPIC BYPASS GASTRIC      , Lenore en Y Gastric Bypass, Dr Gabriel Torres, [facility]    MOUTH SURGERY      [facility] ORAL SURGERY PROCEDURE       OB History    Para Term  AB Living   3 2 1 1 1 1   SAB IAB Ectopic Multiple Live Births   0 0 0 0 0      # Outcome Date GA Lbr Ricardo/2nd Weight Sex Delivery Anes PTL Lv   3 AB            2             1 Term                Review of Systems   Constitutional:  Negative for chills and fever.   HENT:  Negative for congestion, ear pain, hearing loss and sore throat.    Eyes:  Negative for pain and visual disturbance.   Respiratory:  Negative for cough and shortness of breath.    Cardiovascular:  Negative for chest pain, palpitations and peripheral edema.   Gastrointestinal:  Negative for abdominal pain, constipation, diarrhea, heartburn, hematochezia and nausea.   Breasts:   Negative for tenderness, breast mass and discharge.   Genitourinary:  Negative for dysuria, frequency, genital sores, hematuria, pelvic pain, urgency, vaginal bleeding and vaginal discharge.   Musculoskeletal:  Negative for arthralgias, joint swelling and myalgias.   Skin:  Negative for rash.   Neurological:  Negative for dizziness, weakness, headaches and paresthesias.   Psychiatric/Behavioral:  Negative for mood changes. The patient is not nervous/anxious.           OBJECTIVE:   /80   Pulse 70   Temp 97.4  F (36.3  C) (Tympanic)   Resp 16   Wt 81.8 kg (180 lb 6.4 oz)   LMP  (LMP Unknown)   SpO2 97%   Breastfeeding No   BMI 31.46 kg/m    Physical Exam  GENERAL APPEARANCE: healthy, alert and no distress  EYES: Eyes grossly normal to inspection, PERRL and conjunctivae and sclerae normal  HENT: ear canals and TM's normal, nose and mouth without ulcers or lesions, oropharynx clear and oral mucous membranes moist  NECK: no adenopathy, no asymmetry, masses, or scars and thyroid normal to palpation  RESP: lungs clear to auscultation - no rales, rhonchi or wheezes  BREAST: normal without masses, tenderness or nipple discharge and no palpable axillary masses or adenopathy  CV: regular rate and rhythm, normal S1 S2, no S3 or S4, no murmur, click or rub, no peripheral edema and peripheral pulses strong  ABDOMEN: soft, nontender, no hepatosplenomegaly, no masses and bowel sounds normal   (female): normal female external genitalia, normal urethral meatus, vaginal mucosal atrophy noted, normal cervix, adnexae, and uterus without masses or abnormal discharge  MS: no musculoskeletal defects are noted and gait is age appropriate without ataxia  SKIN: no suspicious lesions or rashes  NEURO: Normal strength and tone, sensory exam grossly normal, mentation intact and speech normal  PSYCH: mentation appears normal and affect normal/bright    Diagnostic Test Results:  Labs reviewed in Epic    ASSESSMENT/PLAN:        ICD-10-CM    1. Routine general medical examination at a health care facility  Z00.00       2. Uncomplicated asthma, unspecified asthma severity, unspecified whether persistent  J45.909 albuterol (PROAIR HFA/PROVENTIL HFA/VENTOLIN HFA) 108 (90 Base) MCG/ACT inhaler     fluticasone (FLONASE) 50 MCG/ACT nasal spray      3. Vitamin B12 deficiency  E53.8 Vitamin B12     CBC and Differential     cyanocobalamin (VITAMIN B-12) 1000 MCG tablet     Vitamin B12     CBC and Differential      4. History of gastric bypass  Z98.84 cyanocobalamin (VITAMIN B-12) 1000 MCG tablet     ferrous sulfate (FEROSUL) 325 (65 Fe) MG tablet      5. Female dyspareunia  N94.10 estradiol (ESTRACE) 0.1 MG/GM vaginal cream      6. Other iron deficiency anemia  D50.8 ferrous sulfate (FEROSUL) 325 (65 Fe) MG tablet      7. Pap test, as part of routine gynecological examination  Z01.419 Pap Screen with HPV - recommended age 30 - 65 years      8. Screening cholesterol level  Z13.220 Lipid Panel     Lipid Panel      9. Screening for diabetes mellitus  Z13.1 Comprehensive Metabolic Panel     Comprehensive Metabolic Panel      10. Low ferritin  R79.0 Ferritin     Iron & Iron Binding Capacity     Ferritin     Iron & Iron Binding Capacity      11. Hypovitaminosis D  E55.9 Vitamin D Total     Vitamin D Total      12. Needs flu shot  Z23 INFLUENZA VACCINE 18-64Y (FLUBLOK)      13. Need for COVID-19 vaccine  Z23 COVID-19 12+ (2023-24) (PFIZER)      14. Need for diphtheria-tetanus-pertussis (Tdap) vaccine  Z23 [facility] IMM - TDAP (ADACEL, BOOSTRIX)      15. VALERIE (generalized anxiety disorder)  F41.1 buPROPion (WELLBUTRIN XL) 150 MG 24 hr tablet      16. Mild recurrent major depression (H24)  F33.0 buPROPion (WELLBUTRIN XL) 150 MG 24 hr tablet      17. Skin lesion  L98.9 Adult Dermatology [facility] Referral        Patient was given a Tdap, COVID-19, and flu shot.    Hypovitaminosis D: Completed lab work for monitoring.    Generalized anxiety disorder and mild recurrent  "major depression: Restarted on Wellbutrin  mg daily.  Gave side effect profile.  Encourage good diet and exercise.  See counselor if needed.  Encourage close follow-up in 1 to 2 months if symptoms or not improving or worsening.    Skin lesion: Refer to dermatology for a skin check.  Patient has several skin lesions on her back.    Completed cholesterol and diabetes screening.    Completed Pap and HPV for cervical cancer screening.    Female dyspareunia: Continue medication as previously prescribed.  No acute concerns at this time.  Discussed recent history of mild vaginal bleeding.  Possible irritation with the applicator.  Encourage closely monitoring symptoms.  May need to refer for an OB/GYN consult and pelvic ultrasound if symptoms recur.    Asthma: Currently stable.  No acute concerns at this time.    Completed thorough lab work-up for medication monitoring.  Encourage good diet and exercise.    Repeat physical in 1 year.    Patient has been advised of split billing requirements and indicates understanding: Yes      COUNSELING:  Reviewed preventive health counseling, as reflected in patient instructions       Regular exercise       Healthy diet/nutrition       Vision screening       Hearing screening       Osteoporosis prevention/bone health       Colorectal Cancer Screening       ([place])menopause management      BMI:   Estimated body mass index is 31.46 kg/m  as calculated from the following:    Height as of 6/23/23: 1.613 m (5' 3.5\").    Weight as of this encounter: 81.8 kg (180 lb 6.4 oz).   Weight management plan: Discussed healthy diet and exercise guidelines      She reports that she has never smoked. She has never used smokeless tobacco.            Answers submitted by the patient for this visit:  Patient Health Questionnaire (Submitted on 10/27/2023)  If you checked off any problems, how difficult have these problems made it for you to do your work, take care of things at home, or get along with " other people?: Somewhat difficult  PHQ9 TOTAL SCORE: 8    Tia Magallanes PA-C  [facility]

## 2023-10-30 DIAGNOSIS — E55.9 HYPOVITAMINOSIS D: Primary | ICD-10-CM

## 2023-10-30 RX ORDER — ERGOCALCIFEROL 1.25 MG/1
50000 CAPSULE, LIQUID FILLED ORAL WEEKLY
Qty: 8 CAPSULE | Refills: 0 | Status: SHIPPED | OUTPATIENT
Start: 2023-10-30 | End: 2023-12-19

## 2023-11-01 LAB
BKR LAB AP GYN ADEQUACY: NORMAL
BKR LAB AP GYN INTERPRETATION: NORMAL
BKR LAB AP HPV REFLEX: NORMAL
BKR LAB AP PREVIOUS ABNORMAL: NORMAL
PATH REPORT.COMMENTS IMP SPEC: NORMAL
PATH REPORT.COMMENTS IMP SPEC: NORMAL
PATH REPORT.RELEVANT HX SPEC: NORMAL

## 2023-11-07 ENCOUNTER — MYC MEDICAL ADVICE (OUTPATIENT)
Dept: FAMILY MEDICINE | Facility: OTHER | Age: 58
End: 2023-11-07
Payer: COMMERCIAL

## 2023-11-07 DIAGNOSIS — J03.90 TONSILLITIS: Primary | ICD-10-CM

## 2023-11-08 ENCOUNTER — TELEPHONE (OUTPATIENT)
Dept: FAMILY MEDICINE | Facility: OTHER | Age: 58
End: 2023-11-08
Payer: COMMERCIAL

## 2023-11-08 DIAGNOSIS — R87.810 HUMAN PAPILLOMAVIRUS (HPV) TYPE 16 DNA DETECTED IN CERVICAL SPECIMEN: Primary | ICD-10-CM

## 2023-11-08 LAB
HUMAN PAPILLOMA VIRUS 16 DNA: POSITIVE
HUMAN PAPILLOMA VIRUS 18 DNA: NEGATIVE
HUMAN PAPILLOMA VIRUS FINAL DIAGNOSIS: ABNORMAL
HUMAN PAPILLOMA VIRUS OTHER HR: NEGATIVE

## 2023-11-08 NOTE — TELEPHONE ENCOUNTER
Please call    Your Pap test was normal however your HPV test came back positive for HPV type 16.  HPV is a virus that can cause cervical concerns.  I would recommend having a colposcopy completed which is a biopsy of your cervix to rule out concerns.  I have placed an OB/GYN referral to have this completed.  I would recommend taking 800 mg of ibuprofen 1 hour prior to the procedure to help with your discomfort.  Tia Magallanes PA-C ..................11/8/2023 12:04 PM

## 2023-11-13 ENCOUNTER — TELEPHONE (OUTPATIENT)
Dept: OBGYN | Facility: OTHER | Age: 58
End: 2023-11-13
Payer: COMMERCIAL

## 2023-11-13 NOTE — TELEPHONE ENCOUNTER
Received a referral for patient to have a colposcopy completed form Tia Magallanes NP. Per ASCCP guidelines they recommend that patient have a colposcopy completed. Scheduling informed to call and get patient scheduled for procedure.    America Jay RN on 11/13/2023 at 10:37 AM

## 2023-11-15 ENCOUNTER — TELEPHONE (OUTPATIENT)
Dept: FAMILY MEDICINE | Facility: OTHER | Age: 58
End: 2023-11-15
Payer: COMMERCIAL

## 2023-11-15 RX ORDER — AMOXICILLIN 875 MG
875 TABLET ORAL 2 TIMES DAILY
Qty: 20 TABLET | Refills: 0 | Status: SHIPPED | OUTPATIENT
Start: 2023-11-15 | End: 2023-11-25

## 2023-11-15 ASSESSMENT — PATIENT HEALTH QUESTIONNAIRE - PHQ9
SUM OF ALL RESPONSES TO PHQ QUESTIONS 1-9: 9
10. IF YOU CHECKED OFF ANY PROBLEMS, HOW DIFFICULT HAVE THESE PROBLEMS MADE IT FOR YOU TO DO YOUR WORK, TAKE CARE OF THINGS AT HOME, OR GET ALONG WITH OTHER PEOPLE: NOT DIFFICULT AT ALL
SUM OF ALL RESPONSES TO PHQ QUESTIONS 1-9: 9

## 2023-11-15 NOTE — TELEPHONE ENCOUNTER
Please call the patient.  She has had a sore throat for 3 weeks.  Can she get a lab ordered?  Or swab?  Or does she need an apt.?  She has a procedure tomorrow and she is worried about her throat.        Tia Shah on 11/15/2023 at 4:37 PM

## 2023-11-15 NOTE — TELEPHONE ENCOUNTER
Talked to patient and offered rapid clinic for tonight to do a strep test if needed. Patient declined . She said she will call tomorrow morning to OB/ GYN department about it . Diana Santiago LPN ....................11/15/2023  4:58 PM

## 2023-11-16 ENCOUNTER — OFFICE VISIT (OUTPATIENT)
Dept: OBGYN | Facility: OTHER | Age: 58
End: 2023-11-16
Attending: PHYSICIAN ASSISTANT
Payer: COMMERCIAL

## 2023-11-16 VITALS
SYSTOLIC BLOOD PRESSURE: 170 MMHG | HEART RATE: 72 BPM | BODY MASS INDEX: 30.86 KG/M2 | WEIGHT: 177 LBS | DIASTOLIC BLOOD PRESSURE: 94 MMHG

## 2023-11-16 DIAGNOSIS — R87.810 HUMAN PAPILLOMAVIRUS (HPV) TYPE 16 DNA DETECTED IN CERVICAL SPECIMEN: ICD-10-CM

## 2023-11-16 PROCEDURE — 57454 BX/CURETT OF CERVIX W/SCOPE: CPT | Performed by: OBSTETRICS & GYNECOLOGY

## 2023-11-16 PROCEDURE — 88305 TISSUE EXAM BY PATHOLOGIST: CPT

## 2023-11-16 PROCEDURE — G0463 HOSPITAL OUTPT CLINIC VISIT: HCPCS | Mod: 25

## 2023-11-16 RX ORDER — SODIUM FLUORIDE 6 MG/ML
PASTE, DENTIFRICE DENTAL
COMMUNITY
Start: 2023-10-31

## 2023-11-16 RX ORDER — FLUOCINONIDE GEL 0.5 MG/G
GEL TOPICAL
COMMUNITY
Start: 2023-11-09

## 2023-11-16 NOTE — PROGRESS NOTES
CC:Abnormal pap    HPI: Vikki Carrizales presents for colposcopy due to abnormal pap finding of pos HPV type 16 with NILM.    Past Medical History:   Diagnosis Date    Anemia     Anxiety     Cutaneous abscess     2018    Gastro-esophageal reflux disease without esophagitis         Herpesviral infection of urogenital system     No Comments Provided    Impetigo     recurrent - as a child    Major depressive disorder, single episode     ,Rx: zoloft, citalopram and prozac in the past, none since )    Other seasonal allergic rhinitis     lifelong    Personal history of other medical treatment (CODE)      1 child  in childbirth (); one living child (b )    Uncomplicated asthma         Vitamin D deficiency     No Comments Provided     Past Surgical History:   Procedure Laterality Date     SECTION      ,,HELLP syndrome, Platelet transfusions; son      SECTION          COLONOSCOPY  2019    Normal exam, 5 year follow up per Dr. Yadav    COLONOSCOPY N/A 2019    Procedure: COLONOSCOPY;  Surgeon: Miguel A Yadav MD;  Location:  OR, repeat in 5 years    ESOPHAGOSCOPY, GASTROSCOPY, DUODENOSCOPY (EGD), COMBINED N/A 2019    Procedure: ESOPHAGOGASTRODUODENOSCOPY (EGD);  Surgeon: Miguel A Yadav MD;  Location: [facility] OR    GASTRIC BYPASS      LAPAROSCOPIC BYPASS GASTRIC      , Lenore en Y Gastric Bypass, Dr Gabriel Torres, [facility]    MOUTH SURGERY      [facility] ORAL SURGERY PROCEDURE       Current Outpatient Medications   Medication    acetaminophen (TYLENOL) 500 MG tablet    albuterol (PROAIR HFA/PROVENTIL HFA/VENTOLIN HFA) 108 (90 Base) MCG/ACT inhaler    amoxicillin (AMOXIL) 875 MG tablet    buPROPion (WELLBUTRIN XL) 150 MG 24 hr tablet    cyanocobalamin (VITAMIN B-12) 1000 MCG tablet    estradiol (ESTRACE) 0.1 MG/GM vaginal cream    ferrous sulfate (FEROSUL) 325 (65 Fe) MG tablet    fluticasone (FLONASE) 50 MCG/ACT nasal  spray    ibuprofen (ADVIL/MOTRIN) 400 MG tablet    vitamin D2 (ERGOCALCIFEROL) 93926 units (1250 mcg) capsule     No current facility-administered medications for this visit.     Allergies   Allergen Reactions    Seasonal Allergies      seasonal       LMP  (LMP Unknown)       General Assessment:  Cervix fully visualized: Y  SCJ fully visualized: Y    Normal Colposcopic findings:   Atrophic epithelium    Abnormal Colposcopic findings:    Lesions present:  Acetowhite: no  Location: 3 O'clock  Size:2 mm- random biopsy      Biopsy performed at 3 O'clock  ECC performed: Y    Impression:  Normal/benign      F/u to be determined by pathologic verification of the impression.    Fred Aranda MD FACOG  1:53 PM 11/16/2023

## 2023-11-16 NOTE — NURSING NOTE
Chief Complaint   Patient presents with    Procedure     [place]       Medication Reconciliation: complete    Patient present to the clinic for colp.     Jeri Mckenna LPN

## 2023-11-17 ENCOUNTER — HOSPITAL ENCOUNTER (OUTPATIENT)
Dept: MAMMOGRAPHY | Facility: OTHER | Age: 58
Discharge: HOME | End: 2023-11-17
Payer: COMMERCIAL

## 2023-11-17 ENCOUNTER — ALLIED HEALTH/NURSE VISIT (OUTPATIENT)
Dept: FAMILY MEDICINE | Facility: OTHER | Age: 58
End: 2023-11-17
Payer: COMMERCIAL

## 2023-11-17 VITALS — SYSTOLIC BLOOD PRESSURE: 138 MMHG | OXYGEN SATURATION: 97 % | HEART RATE: 77 BPM | DIASTOLIC BLOOD PRESSURE: 82 MMHG

## 2023-11-17 DIAGNOSIS — Z12.31 VISIT FOR SCREENING MAMMOGRAM: ICD-10-CM

## 2023-11-17 DIAGNOSIS — I10 BENIGN ESSENTIAL HYPERTENSION: Primary | ICD-10-CM

## 2023-11-17 PROCEDURE — 77067 SCR MAMMO BI INCL CAD: CPT

## 2023-11-17 PROCEDURE — G0463 HOSPITAL OUTPT CLINIC VISIT: HCPCS

## 2023-11-17 NOTE — PROGRESS NOTES
Patient states she had high 3 BP checks yesterday. Here for a BP recheck.     Patient requests that writer sends encounter to PCP and Dr. Aranda.     SAJAN HINSON RN on 11/17/2023 at 8:47 AM

## 2023-11-20 ENCOUNTER — TELEPHONE (OUTPATIENT)
Dept: FAMILY MEDICINE | Facility: OTHER | Age: 58
End: 2023-11-20
Payer: COMMERCIAL

## 2023-11-20 NOTE — TELEPHONE ENCOUNTER
Left message to call back....................  11/20/2023   9:45 AM  Diana Santiago LPN ....................11/20/2023  9:45 AM  Ext [phone]

## 2023-11-20 NOTE — TELEPHONE ENCOUNTER
Blood pressures have improved today as compared to yesterday. Are you having any symptoms? Please continue to work on good diet, exercise, low-salt diet, and increasing water intake.  Please continue to monitor your blood pressure over the next month and recheck if they are persistently elevated.  Tia Magallanes PA-C.......... 11/20/2023  8:36 AM

## 2023-11-21 LAB
PATH REPORT.COMMENTS IMP SPEC: NORMAL
PATH REPORT.FINAL DX SPEC: NORMAL
PHOTO IMAGE: NORMAL

## 2023-11-21 NOTE — TELEPHONE ENCOUNTER
Left message to call back....................  11/21/2023   2:40 PM  Diana Santiago LPN ....................11/21/2023  2:40 PM  Ext [phone]

## 2023-11-24 NOTE — TELEPHONE ENCOUNTER
Patient has not called back. This was sent in my chart also ? Diana Santiago LPN ....................11/24/2023  2:59 PM

## 2024-05-16 ENCOUNTER — MYC MEDICAL ADVICE (OUTPATIENT)
Dept: FAMILY MEDICINE | Facility: OTHER | Age: 59
End: 2024-05-16
Payer: COMMERCIAL

## 2024-11-01 NOTE — TELEPHONE ENCOUNTER
Refill request is for a maintenance medication and has met the criteria specified in the Ambulatory Medication Refill Standing Order for eligibility, visits, laboratory, alerts and was sent to the requested pharmacy.    Requested Prescriptions     Signed Prescriptions Disp Refills    potassium chloride (KLOR-CON M20) 20 MEQ Oral Tab  tablet 3     Sig: TAKE 2 TABLETS (40 MEQ) BY MOUTH IN THE MORNING AND 1 TAB (20 MEQ) AT NOON     Authorizing Provider: LAUREN HAIRSTON     Ordering User: TREVON PHILIPPE        Zainab WHITE sent Rx request for the following:      sertraline (ZOLOFT) 25 MG tablet  Sig: Take 1 tablet (25 mg) by mouth daily  Last Prescription Date:   5/6/19  Last Fill Qty/Refills:         30, R-3 (End: 8/28/19)      Discontinued 8/28/19; reason: alternate therapy, however medication was added as historically reported on 9/6/19.  SSRIs Protocol Failed9/10 1:32 PM   Recent (12 mo) or future (30 days) visit within the authorizing provider's specialty     [facility]:  I have sent in a new script for the Wellbutrin, to start taking 300mg daily. For now, I want you to take half a tablet of your Zoloft for the next week and then may stop all together. Once finished with this tapering, start taking 2 tablets of your current Wellbutrin and  the new script when you need to.    Called and spoke to Patient after verifying last name and date of birth. Pt confirmed that she is no longer taking this medication and refill request can be disregarded. Refill request refused, with note to pharmacy. Unable to complete prescription refill per RN Medication Refill Policy. Mirna Keyes RN .............. 9/10/2019  1:41 PM

## 2024-11-14 DIAGNOSIS — F41.1 GAD (GENERALIZED ANXIETY DISORDER): ICD-10-CM

## 2024-11-14 DIAGNOSIS — F33.0 MILD RECURRENT MAJOR DEPRESSION (H): ICD-10-CM

## 2024-11-15 RX ORDER — BUPROPION HYDROCHLORIDE 150 MG/1
150 TABLET ORAL EVERY MORNING
Qty: 90 TABLET | Refills: 3 | Status: SHIPPED | OUTPATIENT
Start: 2024-11-15

## 2024-11-15 NOTE — TELEPHONE ENCOUNTER
Zainab sent Rx request for the following:      Requested Prescriptions   Pending Prescriptions Disp Refills    buPROPion (WELLBUTRIN XL) 150 MG 24 hr tablet [Pharmacy Med Name: BUPROPION XL 150MG TABLETS (24 H)] 90 tablet 3     Sig: TAKE 1 TABLET(150 MG) BY MOUTH EVERY MORNING       Rx Protocol Bupropion Failed - 11/15/2024 10:42 AM        Failed - PHQ-9 score of less than 5 in past 6 months     Please review last PHQ-9 score.           Failed - Recent (12 mo) or future (90 days) visit within the authorizing provider's specialty     The patient must have completed an in-person or virtual visit within the past 12 months or has a future visit scheduled within the next 90 days with the authorizing provider s specialty.  Urgent care and e-visits do not quality as an office visit for this protocol.          Failed - VALERIE-7 on file in last 12 months     Last Prescription Date:   10/27/23  Last Fill Qty/Refills:         90, R-3    Last Office Visit:              10/27/23   Future Office visit:            none     Routing refill request to provider for review/approval.     Cony Willis RN on 11/15/2024 at 12:04 PM

## 2024-11-30 ENCOUNTER — HEALTH MAINTENANCE LETTER (OUTPATIENT)
Age: 59
End: 2024-11-30

## 2025-04-11 NOTE — NURSING NOTE
"Chief Complaint   Patient presents with     Consult     Pain with intercourse x 1 year    Patient presents to clinic for pain with intercourse.     Initial /78   Pulse 76   Wt 78 kg (172 lb)   LMP  (LMP Unknown)   BMI 29.73 kg/m   Estimated body mass index is 29.73 kg/m  as calculated from the following:    Height as of 6/7/22: 1.62 m (5' 3.78\").    Weight as of this encounter: 78 kg (172 lb).  Medication Reconciliation: complete    FOOD SECURITY SCREENING QUESTIONS  Hunger Vital Signs:  Within the past 12 months we worried whether our food would run out before we got money to buy more. Never  Within the past 12 months the food we bought just didn't last and we didn't have money to get more. Never  Emy Magallanes LPN 7/5/2022 1:03 PM              Emy Magallanes LPN  " PAST MEDICAL HISTORY:  No pertinent past medical history

## 2025-04-21 ASSESSMENT — ASTHMA QUESTIONNAIRES
ACT_TOTALSCORE: 25
QUESTION_2 LAST FOUR WEEKS HOW OFTEN HAVE YOU HAD SHORTNESS OF BREATH: NOT AT ALL
QUESTION_4 LAST FOUR WEEKS HOW OFTEN HAVE YOU USED YOUR RESCUE INHALER OR NEBULIZER MEDICATION (SUCH AS ALBUTEROL): NOT AT ALL
QUESTION_1 LAST FOUR WEEKS HOW MUCH OF THE TIME DID YOUR ASTHMA KEEP YOU FROM GETTING AS MUCH DONE AT WORK, SCHOOL OR AT HOME: NONE OF THE TIME
QUESTION_5 LAST FOUR WEEKS HOW WOULD YOU RATE YOUR ASTHMA CONTROL: COMPLETELY CONTROLLED
QUESTION_3 LAST FOUR WEEKS HOW OFTEN DID YOUR ASTHMA SYMPTOMS (WHEEZING, COUGHING, SHORTNESS OF BREATH, CHEST TIGHTNESS OR PAIN) WAKE YOU UP AT NIGHT OR EARLIER THAN USUAL IN THE MORNING: NOT AT ALL

## 2025-04-23 ENCOUNTER — OFFICE VISIT (OUTPATIENT)
Dept: FAMILY MEDICINE | Facility: OTHER | Age: 60
End: 2025-04-23
Attending: PHYSICIAN ASSISTANT
Payer: COMMERCIAL

## 2025-04-23 VITALS
DIASTOLIC BLOOD PRESSURE: 85 MMHG | HEART RATE: 80 BPM | TEMPERATURE: 97 F | HEIGHT: 64 IN | RESPIRATION RATE: 16 BRPM | BODY MASS INDEX: 30.01 KG/M2 | OXYGEN SATURATION: 97 % | WEIGHT: 175.8 LBS | SYSTOLIC BLOOD PRESSURE: 145 MMHG

## 2025-04-23 DIAGNOSIS — D50.8 OTHER IRON DEFICIENCY ANEMIA: ICD-10-CM

## 2025-04-23 DIAGNOSIS — Z13.1 SCREENING FOR DIABETES MELLITUS: ICD-10-CM

## 2025-04-23 DIAGNOSIS — M79.675 PAIN OF TOE OF LEFT FOOT: Primary | ICD-10-CM

## 2025-04-23 DIAGNOSIS — E53.8 VITAMIN B12 DEFICIENCY: ICD-10-CM

## 2025-04-23 DIAGNOSIS — E55.9 HYPOVITAMINOSIS D: ICD-10-CM

## 2025-04-23 LAB
ALBUMIN SERPL BCG-MCNC: 4.3 G/DL (ref 3.5–5.2)
ALP SERPL-CCNC: 123 U/L (ref 40–150)
ALT SERPL W P-5'-P-CCNC: 26 U/L (ref 0–50)
ANION GAP SERPL CALCULATED.3IONS-SCNC: 10 MMOL/L (ref 7–15)
AST SERPL W P-5'-P-CCNC: 38 U/L (ref 0–45)
BILIRUB SERPL-MCNC: 0.2 MG/DL
BUN SERPL-MCNC: 9.8 MG/DL (ref 8–23)
CALCIUM SERPL-MCNC: 9.5 MG/DL (ref 8.8–10.4)
CHLORIDE SERPL-SCNC: 102 MMOL/L (ref 98–107)
CREAT SERPL-MCNC: 0.76 MG/DL (ref 0.51–0.95)
EGFRCR SERPLBLD CKD-EPI 2021: 90 ML/MIN/1.73M2
FERRITIN SERPL-MCNC: 5 NG/ML (ref 11–328)
GLUCOSE SERPL-MCNC: 97 MG/DL (ref 70–99)
HCO3 SERPL-SCNC: 26 MMOL/L (ref 22–29)
POTASSIUM SERPL-SCNC: 4.2 MMOL/L (ref 3.4–5.3)
PROT SERPL-MCNC: 7.2 G/DL (ref 6.4–8.3)
SODIUM SERPL-SCNC: 138 MMOL/L (ref 135–145)
VIT B12 SERPL-MCNC: 474 PG/ML (ref 232–1245)

## 2025-04-23 PROCEDURE — 82728 ASSAY OF FERRITIN: CPT | Mod: ZL | Performed by: PHYSICIAN ASSISTANT

## 2025-04-23 PROCEDURE — 36415 COLL VENOUS BLD VENIPUNCTURE: CPT | Mod: ZL | Performed by: PHYSICIAN ASSISTANT

## 2025-04-23 PROCEDURE — 82607 VITAMIN B-12: CPT | Mod: ZL | Performed by: PHYSICIAN ASSISTANT

## 2025-04-23 PROCEDURE — 84155 ASSAY OF PROTEIN SERUM: CPT | Mod: ZL | Performed by: PHYSICIAN ASSISTANT

## 2025-04-23 PROCEDURE — 82947 ASSAY GLUCOSE BLOOD QUANT: CPT | Mod: ZL | Performed by: PHYSICIAN ASSISTANT

## 2025-04-23 ASSESSMENT — PAIN SCALES - GENERAL: PAINLEVEL_OUTOF10: NO PAIN (0)

## 2025-04-23 NOTE — PATIENT INSTRUCTIONS
Shoes with good mild arch support.   Ankle ABCs.   Warm water soaks daily for 10-15 minutes.   Referred to orthopedics.

## 2025-04-23 NOTE — NURSING NOTE
"Chief Complaint   Patient presents with    foot pain       Initial BP (!) 158/96 (BP Location: Left arm, Patient Position: Sitting, Cuff Size: Adult Regular)   Pulse 80   Temp 97  F (36.1  C) (Tympanic)   Resp 16   Ht 1.613 m (5' 3.5\")   Wt 79.7 kg (175 lb 12.8 oz)   LMP  (LMP Unknown)   SpO2 97%   BMI 30.65 kg/m   Estimated body mass index is 30.65 kg/m  as calculated from the following:    Height as of this encounter: 1.613 m (5' 3.5\").    Weight as of this encounter: 79.7 kg (175 lb 12.8 oz).  Medication Review: complete    The next two questions are to help us understand your food security.  If you are feeling you need any assistance in this area, we have resources available to support you today.          10/27/2023   SDOH- Food Insecurity   Within the past 12 months, did you worry that your food would run out before you got money to buy more? N   Within the past 12 months, did the food you bought just not last and you didn t have money to get more? N         Health Care Directive:  Patient does not have a Health Care Directive: Discussed advance care planning with patient; however, patient declined at this time.    Helena Og      "

## 2025-04-23 NOTE — PROGRESS NOTES
"  Assessment & Plan   Problem List Items Addressed This Visit          Digestive    Vitamin B12 deficiency    Relevant Orders    Vitamin B12       Hematologic    Anemia, iron deficiency    Relevant Orders    Ferritin     Other Visit Diagnoses       Pain of toe of left foot    -  Primary    Relevant Orders    Orthopedic  Referral    Hypovitaminosis D        Relevant Orders    Vitamin D Total    Screening for diabetes mellitus        Relevant Orders    Comprehensive Metabolic Panel (Completed)           Completed CMP for diabetes mellitus screening.  Completed vitamin D with history of hypovitaminosis D.  Completed ferritin with history of iron deficiency anemia.  Completed vitamin B12 with history of vitamin B12 deficiency.  Labs are pending.  Encouraged continued good diet and exercise.    Pain of toe of left foot: Discussed symptoms and concerns at length.  Likely tendinosis.  Encouraged using:  Shoes with good mild arch support.   Ankle ABCs.   Warm water soaks daily for 10-15 minutes.   Referred to orthopedics for consult.   Return to clinic with change/worsening of symptoms.     The longitudinal plan of care for the diagnosis(es)/condition(s) as documented were addressed during this visit. Due to the added complexity in care, I will continue to support Vikki in the subsequent management and with ongoing continuity of care.    BMI  Estimated body mass index is 30.65 kg/m  as calculated from the following:    Height as of this encounter: 1.613 m (5' 3.5\").    Weight as of this encounter: 79.7 kg (175 lb 12.8 oz).           Subjective   Vikki is a 59 year old, presenting for the following health issues:  foot pain        4/23/2025     3:34 PM   Additional Questions   Roomed by Helena CAMARILLO   Accompanied by self     History of Present Illness       Reason for visit:  Foot pain, similar to Manriquez's neuroma  Symptom onset:  More than a month  Symptoms include:  Infrequent but intense pain beneath third and " "fourth toes left foot, sometimes with numbness & tingling of fourth toe.  Symptom intensity:  Severe  Symptom progression:  Staying the same  Had these symptoms before:  Yes  Has tried/received treatment for these symptoms:  No  What makes it worse:  No  What makes it better:  After the worst bout, warm compression between my palms helped a little, but it took a long time for the pain to subside enough for me to touch my foot. She is missing 1 dose(s) of medications per week.  She is not taking prescribed medications regularly due to remembering to take.      Patient has had foot pain off and on in the same spot on the same foot over the last few years.  Has been present since 2022.  Concerned that it may be related to high heels that she is worn repeatedly in the past.  Patient is very active in the summertime with hiking, biking emerges, box, and in the woods.  Wears good footwear now however she still has twinges of pain underneath the left fourth medial toe at the base.  Has some numbness and tingling on the left fourth toe.  No trauma or injury.    Review of Systems  Constitutional, HEENT, cardiovascular, pulmonary, gi and gu systems are negative, except as otherwise noted.      Objective    BP (!) 145/85 (BP Location: Left arm, Patient Position: Sitting, Cuff Size: Adult Regular)   Pulse 80   Temp 97  F (36.1  C) (Tympanic)   Resp 16   Ht 1.613 m (5' 3.5\")   Wt 79.7 kg (175 lb 12.8 oz)   LMP  (LMP Unknown)   SpO2 97%   BMI 30.65 kg/m    Body mass index is 30.65 kg/m .  Physical Exam  Vitals and nursing note reviewed.   Constitutional:       Appearance: Normal appearance.   HENT:      Head: Normocephalic and atraumatic.   Musculoskeletal:         General: No swelling or signs of injury. Normal range of motion.      Cervical back: Normal range of motion.      Comments: Mild pain with palpation inferior to the left proximal fourth medial toe on the plantar foot.  No bruising or swelling appreciated. "   Skin:     General: Skin is warm and dry.   Neurological:      General: No focal deficit present.      Mental Status: She is alert and oriented to person, place, and time.      Motor: No weakness.      Coordination: Coordination normal.      Gait: Gait normal.      Deep Tendon Reflexes: Reflexes normal.   Psychiatric:         Mood and Affect: Mood normal.         Behavior: Behavior normal.            Results for orders placed or performed in visit on 04/23/25   Comprehensive Metabolic Panel     Status: Normal   Result Value Ref Range    Sodium 138 135 - 145 mmol/L    Potassium 4.2 3.4 - 5.3 mmol/L    Carbon Dioxide (CO2) 26 22 - 29 mmol/L    Anion Gap 10 7 - 15 mmol/L    Urea Nitrogen 9.8 8.0 - 23.0 mg/dL    Creatinine 0.76 0.51 - 0.95 mg/dL    GFR Estimate 90 >60 mL/min/1.73m2    Calcium 9.5 8.8 - 10.4 mg/dL    Chloride 102 98 - 107 mmol/L    Glucose 97 70 - 99 mg/dL    Alkaline Phosphatase 123 40 - 150 U/L    AST 38 0 - 45 U/L    ALT 26 0 - 50 U/L    Protein Total 7.2 6.4 - 8.3 g/dL    Albumin 4.3 3.5 - 5.2 g/dL    Bilirubin Total 0.2 <=1.2 mg/dL           Signed Electronically by: Tia Magallanes PA-C

## 2025-04-24 DIAGNOSIS — E53.8 VITAMIN B12 DEFICIENCY: ICD-10-CM

## 2025-04-24 DIAGNOSIS — Z98.84 HISTORY OF GASTRIC BYPASS: ICD-10-CM

## 2025-04-24 DIAGNOSIS — D50.8 OTHER IRON DEFICIENCY ANEMIA: ICD-10-CM

## 2025-04-24 RX ORDER — FERROUS SULFATE 325(65) MG
325 TABLET ORAL
Qty: 90 TABLET | Refills: 3 | Status: SHIPPED | OUTPATIENT
Start: 2025-04-24

## 2025-04-24 RX ORDER — LANOLIN ALCOHOL/MO/W.PET/CERES
1000 CREAM (GRAM) TOPICAL DAILY
Qty: 90 TABLET | Refills: 3 | Status: SHIPPED | OUTPATIENT
Start: 2025-04-24

## 2025-05-06 DIAGNOSIS — M79.672 LEFT FOOT PAIN: Primary | ICD-10-CM

## 2025-05-08 ENCOUNTER — PATIENT OUTREACH (OUTPATIENT)
Dept: GASTROENTEROLOGY | Facility: CLINIC | Age: 60
End: 2025-05-08

## 2025-05-08 ENCOUNTER — HOSPITAL ENCOUNTER (OUTPATIENT)
Dept: GENERAL RADIOLOGY | Facility: OTHER | Age: 60
Discharge: HOME | End: 2025-05-08
Attending: PODIATRIST
Payer: COMMERCIAL

## 2025-05-08 ENCOUNTER — OFFICE VISIT (OUTPATIENT)
Dept: ORTHOPEDICS | Facility: OTHER | Age: 60
End: 2025-05-08
Attending: PHYSICIAN ASSISTANT
Payer: COMMERCIAL

## 2025-05-08 VITALS
HEART RATE: 81 BPM | RESPIRATION RATE: 16 BRPM | OXYGEN SATURATION: 99 % | WEIGHT: 175 LBS | HEIGHT: 64 IN | BODY MASS INDEX: 29.88 KG/M2 | DIASTOLIC BLOOD PRESSURE: 72 MMHG | SYSTOLIC BLOOD PRESSURE: 128 MMHG

## 2025-05-08 DIAGNOSIS — Z12.11 SPECIAL SCREENING FOR MALIGNANT NEOPLASMS, COLON: Primary | ICD-10-CM

## 2025-05-08 DIAGNOSIS — M79.672 LEFT FOOT PAIN: ICD-10-CM

## 2025-05-08 DIAGNOSIS — M79.675 PAIN OF TOE OF LEFT FOOT: Primary | ICD-10-CM

## 2025-05-08 PROCEDURE — 73630 X-RAY EXAM OF FOOT: CPT | Mod: LT

## 2025-05-08 PROCEDURE — 250N000009 HC RX 250: Performed by: PODIATRIST

## 2025-05-08 PROCEDURE — 250N000011 HC RX IP 250 OP 636: Performed by: PODIATRIST

## 2025-05-08 RX ORDER — TRIAMCINOLONE ACETONIDE 40 MG/ML
20 INJECTION, SUSPENSION INTRA-ARTICULAR; INTRAMUSCULAR ONCE
Status: COMPLETED | OUTPATIENT
Start: 2025-05-08 | End: 2025-05-08

## 2025-05-08 RX ORDER — LIDOCAINE HYDROCHLORIDE 10 MG/ML
1 INJECTION, SOLUTION INFILTRATION; PERINEURAL ONCE
Status: COMPLETED | OUTPATIENT
Start: 2025-05-08 | End: 2025-05-08

## 2025-05-08 RX ADMIN — TRIAMCINOLONE ACETONIDE 20 MG: 40 INJECTION, SUSPENSION INTRA-ARTICULAR; INTRAMUSCULAR at 10:06

## 2025-05-08 RX ADMIN — LIDOCAINE HYDROCHLORIDE 1 ML: 10 INJECTION, SOLUTION EPIDURAL; INFILTRATION; INTRACAUDAL; PERINEURAL at 10:06

## 2025-05-08 ASSESSMENT — PAIN SCALES - GENERAL: PAINLEVEL_OUTOF10: SEVERE PAIN (7)

## 2025-05-08 NOTE — PROGRESS NOTES
SUBJECTIVE:  Silvino is a 59-year-old new patient see me today regarding left forefoot pain.  She states she thinks she has a neuroma.  Associated with specific shoes high heels cause issues pushing off the front of her foot causes issues.  She has numbness sharp pain between 3rd and 4th toe.    ROS: Musculoskeletal and general review of systems are negative, per review of previous clinic questionnaire.  Denies SOB and calf pain.    EXAM:   PHYSICAL EXAMINATION:   CONSTITUTIONAL:  The patient is alert and oriented x 3, well appearing and in no apparent distress.  Affect is pleasant and answers questions appropriately.  VASCULAR:  Circulation is intact with palpable pedal pulses and adequate capillary fill time to all digits.  Hair growth is present and appropriate to mid foot and digits. Calf nontender.  NEUROLOGIC:  Light touch sensation is intact to digits.  There is a negative Tinel sign.    INTEGUMENT:  No abnormal dermatologic lesions are noted.  Skin has normal texture and turgor.    MUSCULOSKELETAL: Left foot evaluated.  She is tender to palpate third and metatarsal space tender with metatarsal squeeze there is a palpable Juan Manuel's click and this does recreate her symptoms.    IMAGING: X-rays taken today demonstrate bunion deformity some mild arch collapse no acute concerns found.    ASSESSMENT: Left foot Manriquez's neuroma    PLAN OF CARE: Discussed condition and treatment patient today.  We did an injection today in addition to office visit.  Discussed appropriate conservative cares including wider shoe gear and metatarsal pads which she will try.  With ongoing pain she will reappoint.    Procedure: Patient's left dorsal third interspace prepped with alcohol.  I injected from this interval to the plantar aspect of the foot around the proper plantar digital nerve with 20 mg of Kenalog and 1 mL of 1% lidocaine plain.    Nnamdi Forte DPM

## 2025-05-08 NOTE — PROGRESS NOTES
"[facility] Screening Colonoscopy Referral Review    Patient meets the inclusion criteria for screening colonoscopy standing order.    Ordering/Referring Provider:  Tia Magallanes      BMI: Estimated body mass index is 30.51 kg/m  as calculated from the following:    Height as of an earlier encounter on 5/8/25: 1.613 m (5' 3.5\").    Weight as of an earlier encounter on 5/8/25: 79.4 kg (175 lb).     Sedation:  Does patient have any of the following conditions affecting sedation?  No medical conditions affecting sedation.    Previous Scopes:  Any previous recommendations or follow up needs based on previous scope?  na / No recommendations.    Medical Concerns to Postpone Order:  Does patient have any of the following medical concerns that should postpone/delay colonoscopy referral?  No medical conditions affecting colonoscopy referral.    Final Referral Details:  Based on patient's medical history patient is appropriate for referral order with moderate sedation. If patient's BMI > 50 do not schedule in [facility].  "

## 2025-05-27 ENCOUNTER — NURSE TRIAGE (OUTPATIENT)
Dept: FAMILY MEDICINE | Facility: OTHER | Age: 60
End: 2025-05-27
Payer: COMMERCIAL

## 2025-05-28 ENCOUNTER — TELEPHONE (OUTPATIENT)
Dept: FAMILY MEDICINE | Facility: OTHER | Age: 60
End: 2025-05-28
Payer: COMMERCIAL

## 2025-05-28 DIAGNOSIS — Z12.11 ENCOUNTER FOR SCREENING COLONOSCOPY: Primary | ICD-10-CM

## 2025-05-28 RX ORDER — BISACODYL 5 MG/1
TABLET, DELAYED RELEASE ORAL
Qty: 2 TABLET | Refills: 0 | Status: SHIPPED | OUTPATIENT
Start: 2025-07-22

## 2025-05-28 RX ORDER — POLYETHYLENE GLYCOL 3350, SODIUM CHLORIDE, SODIUM BICARBONATE, POTASSIUM CHLORIDE 420; 11.2; 5.72; 1.48 G/4L; G/4L; G/4L; G/4L
4000 POWDER, FOR SOLUTION ORAL ONCE
Qty: 4000 ML | Refills: 0 | Status: SHIPPED | OUTPATIENT
Start: 2025-07-22 | End: 2025-07-22

## 2025-05-28 NOTE — TELEPHONE ENCOUNTER
Screening Questions for the Scheduling of Screening Colonoscopies   (If Colonoscopy is diagnostic, Provider should review the chart before scheduling.)  Are you younger than 45 or older than 80?  NO  Do you take aspirin or fish oil?  NO (if yes, tell patient to stop 1 week prior to Colonoscopy)  Do you take warfarin (Coumadin), clopidogrel (Plavix), apixaban (Eliquis), dabigatram (Pradaxa), rivaroxaban (Xarelto) or any blood thinner? NO  Do you take semaglutide (Ozempic or Wegovy), tirzepatide (Mounjaro or Zepbound), liraglutide (Victoza), or dulaglutide (Trulicity)? NO  Do you use oxygen or a CPAP at home?  NO  Do you have kidney disease? NO  Are you on dialysis? NO  Have you had a stroke or heart attack in the last year? NO  Have you had a stent in your heart or any blood vessel in the last year? NO  Have you had a transplant of any organ? NO  Have you had a colonoscopy or upper endoscopy (EGD) before? YES         When?  2019  Date of scheduled Colonoscopy. 07/29/2025  Provider [facility]  Pharmacy [facility]

## 2025-05-28 NOTE — TELEPHONE ENCOUNTER
Agree with plan. Does not meet criteria for prophylactic antibiotic.     Korina Sher NP on 5/28/2025 at 2:00 PM

## 2025-05-28 NOTE — TELEPHONE ENCOUNTER
"S-(situation): deer tick bite    B-(background): patient states the max amount of time tick could have been attached is 10 hours.  States removed tick last night.  States washed area with soap and water and applied a one time dose of antibiotic ointment.  States today there is only the red area around bite about the size of a pencil eraser.  States the pinkish area around the dark red is gone.  States bite is right on pant line on abdomen.  Denies feeling warm to touch, red streaks , fever or any other symptoms.    A-(assessment): deer tick bite    R-(recommendations): home care instructions reviewed with patient.  Will route to provider for review of pictures and advise if anything different.  Cristy Torres RN on 5/28/2025 at 1:53 PM      Reason for Disposition   Tick bite with no complications    Additional Information   Negative: Not a tick bite   Negative: Patient sounds very sick or weak to the triager   Negative: Fever or severe headache occurs, 2 to 14 days following the bite   Negative: Widespread rash occurs, 2 to 14 days following the bite   Negative: Can't remove live tick (after using Care Advice)   Negative: Fever and spreading red area or streak   Negative: Fever and area is very tender to touch   Negative: Red streak or red line and length > 2 inches (5 cm)   Negative: Red or very tender (to touch) area and started over 24 hours after the bite   Negative: Red ring or bull's-eye rash occurs around a deer tick bite   Negative: Probable deer tick that was attached > 36 hours (or tick appears swollen, not flat)   Negative: Patient wants to be seen    Answer Assessment - Initial Assessment Questions  1. ATTACHED:  \"Is the tick still on the skin?\"  (e.g., yes, no, unsure)      no  2. ONSET - TICK STILL ATTACHED:  \"How long do you think the tick has been on your skin?\" (e.g., hours, days, unsure)  Note:  Is there a recent activity (camping, hiking) where the caller may have been exposed?     Maybe max " "possible  3. ONSET - TICK NOT STILL ATTACHED: \"If the tick has been removed, how long do you think the tick was attached before you removed it?\" (e.g., 5 hours, 2 days). \"When was this?\"      Removed last night 630 pm  4. LOCATION: \"Where is the tick bite located?\" (e.g., arm, leg)      abdomen  5. TYPE of TICK: \"Is it a wood tick or a deer tick?\" (e.g., deer tick, wood tick; unsure)      Deer tick  6. SIZE of TICK: \"How big is the tick?\" (e.g., size of poppy seed, apple seed, watermelon seed; unsure) Note: Deer ticks can be the size of a poppy seed (nymph) or an apple seed (adult).        Closer to apple seed  7. ENGORGED: \"Did the tick look flat or engorged (full, swollen)?\" (e.g., flat, engorged; unsure)      flat  8. OTHER SYMPTOMS: \"Do you have any other symptoms?\" (e.g., fever, rash, redness at bite area, red ring around bite)      Red spot about eraser size on pencil  9. PREGNANCY: \"Is there any chance you are pregnant?\" \"When was your last menstrual period?\"      denies    Protocols used: Tick Bite-A-OH    "

## 2025-05-28 NOTE — TELEPHONE ENCOUNTER
Called and informed patient of Korina Sher's response and patient verbalized understanding.  Cristy Torres RN on 5/28/2025 at 2:03 PM

## 2025-05-28 NOTE — TELEPHONE ENCOUNTER
This was taken care of already per patient when she was contacted     Korina Martinez CMA on 5/28/2025 at 2:14 PM

## 2025-05-28 NOTE — TELEPHONE ENCOUNTER
Removed a tick (on less than 10 hours).    Appears to be a deer tick.     Came off easily.  Red monico.         Turned [employer] message into traige encounter.      Routed to Unit 1 Care Team RN for triage pool.    Updated patient on [employer].      Kathryn Melissa, RN on 5/28/2025 at 7:49 AM

## 2025-05-29 ENCOUNTER — HOSPITAL ENCOUNTER (OUTPATIENT)
Facility: OTHER | Age: 60
End: 2025-05-29
Attending: SURGERY | Admitting: SURGERY
Payer: COMMERCIAL

## 2025-06-13 ENCOUNTER — TRANSFERRED RECORDS (OUTPATIENT)
Dept: HEALTH INFORMATION MANAGEMENT | Facility: OTHER | Age: 60
End: 2025-06-13
Payer: COMMERCIAL

## 2025-06-16 ENCOUNTER — TELEPHONE (OUTPATIENT)
Dept: ORTHOPEDICS | Facility: OTHER | Age: 60
End: 2025-06-16
Payer: COMMERCIAL

## 2025-06-16 NOTE — TELEPHONE ENCOUNTER
Patient called over the weekend requesting an order be sent to [facility] for shoes. Please let her know if this can be done.     Cyn Montes on 6/16/2025 at 7:09 AM

## 2025-06-17 NOTE — TELEPHONE ENCOUNTER
Faxed from [facility].   Naty Zepeda LPN .....................6/17/2025 10:19 AM

## 2025-08-06 ENCOUNTER — MYC REFILL (OUTPATIENT)
Dept: FAMILY MEDICINE | Facility: OTHER | Age: 60
End: 2025-08-06
Payer: COMMERCIAL

## 2025-08-06 ENCOUNTER — MYC MEDICAL ADVICE (OUTPATIENT)
Dept: FAMILY MEDICINE | Facility: OTHER | Age: 60
End: 2025-08-06
Payer: COMMERCIAL

## 2025-08-06 DIAGNOSIS — F33.0 MILD RECURRENT MAJOR DEPRESSION: ICD-10-CM

## 2025-08-06 DIAGNOSIS — J45.909 UNCOMPLICATED ASTHMA, UNSPECIFIED ASTHMA SEVERITY, UNSPECIFIED WHETHER PERSISTENT: ICD-10-CM

## 2025-08-06 DIAGNOSIS — E55.9 HYPOVITAMINOSIS D: ICD-10-CM

## 2025-08-06 DIAGNOSIS — F41.1 GAD (GENERALIZED ANXIETY DISORDER): ICD-10-CM

## 2025-08-06 RX ORDER — ALBUTEROL SULFATE 90 UG/1
1 INHALANT RESPIRATORY (INHALATION) EVERY 6 HOURS PRN
Qty: 18 G | Refills: 11 | Status: SHIPPED | OUTPATIENT
Start: 2025-08-06

## 2025-08-06 RX ORDER — ERGOCALCIFEROL 1.25 MG/1
50000 CAPSULE, LIQUID FILLED ORAL WEEKLY
Qty: 8 CAPSULE | Refills: 0 | Status: SHIPPED | OUTPATIENT
Start: 2025-08-06 | End: 2025-09-25

## 2025-08-06 RX ORDER — ALBUTEROL SULFATE 90 UG/1
INHALANT RESPIRATORY (INHALATION)
Qty: 18 G | Refills: 3 | Status: CANCELLED | OUTPATIENT
Start: 2025-08-06

## 2025-08-06 RX ORDER — FLUTICASONE PROPIONATE 50 MCG
2 SPRAY, SUSPENSION (ML) NASAL DAILY
Qty: 18.2 ML | Refills: 11 | Status: CANCELLED | OUTPATIENT
Start: 2025-08-06

## 2025-08-06 RX ORDER — FLUTICASONE PROPIONATE 50 MCG
2 SPRAY, SUSPENSION (ML) NASAL DAILY
Qty: 18.2 ML | Refills: 11 | Status: SHIPPED | OUTPATIENT
Start: 2025-08-06

## 2025-08-06 RX ORDER — BUPROPION HYDROCHLORIDE 300 MG/1
300 TABLET ORAL EVERY MORNING
Qty: 90 TABLET | Refills: 3 | Status: SHIPPED | OUTPATIENT
Start: 2025-08-06

## 2025-08-10 RX ORDER — ALBUTEROL SULFATE 90 UG/1
INHALANT RESPIRATORY (INHALATION)
Qty: 18 G | Refills: 3 | OUTPATIENT
Start: 2025-08-10

## 2025-08-10 RX ORDER — FLUTICASONE PROPIONATE 50 MCG
2 SPRAY, SUSPENSION (ML) NASAL DAILY
Qty: 16 G | OUTPATIENT
Start: 2025-08-10

## 2025-08-30 ENCOUNTER — HOSPITAL ENCOUNTER (EMERGENCY)
Facility: OTHER | Age: 60
Discharge: HOME | End: 2025-08-30
Attending: EMERGENCY MEDICINE
Payer: COMMERCIAL

## 2025-08-30 VITALS
BODY MASS INDEX: 32.32 KG/M2 | OXYGEN SATURATION: 100 % | HEIGHT: 63 IN | HEART RATE: 91 BPM | SYSTOLIC BLOOD PRESSURE: 168 MMHG | TEMPERATURE: 98.2 F | WEIGHT: 182.38 LBS | DIASTOLIC BLOOD PRESSURE: 100 MMHG | RESPIRATION RATE: 20 BRPM

## 2025-08-30 DIAGNOSIS — L03.115 CELLULITIS OF BOTH LOWER EXTREMITIES: Primary | ICD-10-CM

## 2025-08-30 DIAGNOSIS — L03.116 CELLULITIS OF BOTH LOWER EXTREMITIES: Primary | ICD-10-CM

## 2025-08-30 PROCEDURE — 86618 LYME DISEASE ANTIBODY: CPT | Performed by: EMERGENCY MEDICINE

## 2025-08-30 PROCEDURE — 36415 COLL VENOUS BLD VENIPUNCTURE: CPT | Performed by: EMERGENCY MEDICINE

## 2025-08-30 PROCEDURE — 87468 ANAPLSMA PHGCYTOPHLM AMP PRB: CPT | Performed by: EMERGENCY MEDICINE

## 2025-08-30 PROCEDURE — 99283 EMERGENCY DEPT VISIT LOW MDM: CPT | Performed by: EMERGENCY MEDICINE

## 2025-08-30 RX ORDER — DOXYCYCLINE 100 MG/1
100 CAPSULE ORAL 2 TIMES DAILY
Qty: 28 CAPSULE | Refills: 0 | Status: SHIPPED | OUTPATIENT
Start: 2025-08-30

## 2025-08-30 ASSESSMENT — COLUMBIA-SUICIDE SEVERITY RATING SCALE - C-SSRS
1. IN THE PAST MONTH, HAVE YOU WISHED YOU WERE DEAD OR WISHED YOU COULD GO TO SLEEP AND NOT WAKE UP?: NO
6. HAVE YOU EVER DONE ANYTHING, STARTED TO DO ANYTHING, OR PREPARED TO DO ANYTHING TO END YOUR LIFE?: NO
2. HAVE YOU ACTUALLY HAD ANY THOUGHTS OF KILLING YOURSELF IN THE PAST MONTH?: NO

## 2025-08-30 ASSESSMENT — ACTIVITIES OF DAILY LIVING (ADL): ADLS_ACUITY_SCORE: 41

## 2025-08-31 ENCOUNTER — PATIENT OUTREACH (OUTPATIENT)
Dept: CARE COORDINATION | Facility: CLINIC | Age: 60
End: 2025-08-31
Payer: COMMERCIAL

## 2025-09-01 ENCOUNTER — MYC MEDICAL ADVICE (OUTPATIENT)
Dept: FAMILY MEDICINE | Facility: OTHER | Age: 60
End: 2025-09-01
Payer: COMMERCIAL

## 2025-09-01 LAB
A PHAGOCYTOPH DNA BLD QL NAA+PROBE: NOT DETECTED
BABESIA DNA BLD QL NAA+PROBE: NOT DETECTED
EHRLICHIA DNA SPEC QL NAA+PROBE: NOT DETECTED

## 2025-09-02 LAB — B BURGDOR IGG+IGM SER QL: 0.13

## (undated) DEVICE — ENDO KIT COMPLIANCE DYKENDOCMPLY

## (undated) DEVICE — SOL WATER 1500ML

## (undated) DEVICE — TUBING SUCTION 10'X3/16" N510

## (undated) DEVICE — ENDO BITE BLOCK 60 MAXI LF 00712804

## (undated) DEVICE — Device

## (undated) DEVICE — ENDO BRUSH CHANNEL MASTER CLEANING 2-4.2MM BW-412T

## (undated) DEVICE — SUCTION MANIFOLD NEPTUNE 2 SYS 4 PORT 0702-020-000

## (undated) DEVICE — SYR 50ML LL W/O NDL 309653

## (undated) RX ORDER — LIDOCAINE HYDROCHLORIDE 10 MG/ML
INJECTION, SOLUTION EPIDURAL; INFILTRATION; INTRACAUDAL; PERINEURAL
Status: DISPENSED
Start: 2025-05-08

## (undated) RX ORDER — TRIAMCINOLONE ACETONIDE 40 MG/ML
INJECTION, SUSPENSION INTRA-ARTICULAR; INTRAMUSCULAR
Status: DISPENSED
Start: 2025-05-08

## (undated) RX ORDER — PROPOFOL 10 MG/ML
INJECTION, EMULSION INTRAVENOUS
Status: DISPENSED
Start: 2019-11-05

## (undated) RX ORDER — LIDOCAINE HYDROCHLORIDE 20 MG/ML
INJECTION, SOLUTION EPIDURAL; INFILTRATION; INTRACAUDAL; PERINEURAL
Status: DISPENSED
Start: 2019-11-05